# Patient Record
Sex: FEMALE | Race: WHITE | Employment: PART TIME | URBAN - METROPOLITAN AREA
[De-identification: names, ages, dates, MRNs, and addresses within clinical notes are randomized per-mention and may not be internally consistent; named-entity substitution may affect disease eponyms.]

---

## 2017-10-03 ENCOUNTER — OFFICE VISIT (OUTPATIENT)
Dept: FAMILY MEDICINE CLINIC | Age: 47
End: 2017-10-03
Payer: COMMERCIAL

## 2017-10-03 VITALS
BODY MASS INDEX: 22.88 KG/M2 | HEART RATE: 83 BPM | OXYGEN SATURATION: 95 % | WEIGHT: 134 LBS | RESPIRATION RATE: 12 BRPM | HEIGHT: 64 IN | DIASTOLIC BLOOD PRESSURE: 74 MMHG | SYSTOLIC BLOOD PRESSURE: 104 MMHG

## 2017-10-03 DIAGNOSIS — Z13.89 SKIN CONDITION SCREENING: ICD-10-CM

## 2017-10-03 DIAGNOSIS — Z00.00 WELL ADULT EXAM: Primary | ICD-10-CM

## 2017-10-03 PROCEDURE — 99396 PREV VISIT EST AGE 40-64: CPT | Performed by: FAMILY MEDICINE

## 2017-10-03 NOTE — PROGRESS NOTES
disturbance. Respiratory: Negative for cough and shortness of breath. Cardiovascular: Negative for chest pain and leg swelling. Gastrointestinal: Negative for diarrhea, constipation and blood in stool. Endocrine: Negative for polydipsia and polyuria. Genitourinary: Negative for dysuria and hematuria. Musculoskeletal: Negative for back pain and gait problem. Skin: Negative for color change and rash. Neurological: Negative for dizziness and headaches. Psychiatric/Behavioral: Negative for confusion and agitation. Objective:   HENT:   /74  Pulse 83  Resp 12  Ht 5' 3.5\" (1.613 m)  Wt 134 lb (60.8 kg)  SpO2 95%  BMI 23.36 kg/m2  Constitutional: Alert and oriented. Well-nourished. Head: Normocephalic and atraumatic. Right Ear: External ear normal. TM: no bulging, erythema or fluid seen. Left Ear: External ear normal. TM: no bulging, erythema or fluid seen. Nose: Nose normal.   Mouth/Throat: Oropharynx is clear and moist.  teeth in good repair. Eyes: Pupils are equal, round, and reactive to light. Right eye exhibits no discharge. Left eye exhibits no discharge. No scleral icterus. Neck: Normal range of motion. Neck supple. No JVD present. No tracheal deviation present. No thyromegaly present. Cardiovascular: Normal rate, regular rhythm, normal heart sounds and intact distal pulses. Pulmonary/Chest: Effort normal and breath sounds normal. No respiratory distress. She has no wheezes. She has no rales. Abdominal: Soft. Bowel sounds are normal.  She exhibits no distension and no mass. There is no tenderness. There is no rebound and no guarding. Musculoskeletal: Normal range of motion. She exhibits no edema or tenderness. Lymphadenopathy:    She has no cervical adenopathy. Neurological:  She is alert and oriented to person, place, and time. Cranial nerves grossly intact. No sensation problem noted. Muscle strength 5/5 throughout. Skin: Skin is warm and dry.  No rash

## 2017-10-14 LAB
ALBUMIN SERPL-MCNC: NORMAL G/DL
ALP BLD-CCNC: NORMAL U/L
ALT SERPL-CCNC: NORMAL U/L
ANION GAP SERPL CALCULATED.3IONS-SCNC: NORMAL MMOL/L
AST SERPL-CCNC: NORMAL U/L
BASOPHILS ABSOLUTE: NORMAL /ΜL
BASOPHILS RELATIVE PERCENT: NORMAL %
BILIRUB SERPL-MCNC: NORMAL MG/DL (ref 0.1–1.4)
BILIRUBIN, URINE: NORMAL
BLOOD, URINE: NORMAL
BUN BLDV-MCNC: NORMAL MG/DL
CALCIUM SERPL-MCNC: NORMAL MG/DL
CHLORIDE BLD-SCNC: NORMAL MMOL/L
CHOLESTEROL, TOTAL: 201 MG/DL
CHOLESTEROL/HDL RATIO: 3.8
CLARITY: NORMAL
CO2: NORMAL MMOL/L
COLOR: NORMAL
CREAT SERPL-MCNC: NORMAL MG/DL
EOSINOPHILS ABSOLUTE: NORMAL /ΜL
EOSINOPHILS RELATIVE PERCENT: NORMAL %
GFR CALCULATED: NORMAL
GLUCOSE BLD-MCNC: NORMAL MG/DL
GLUCOSE URINE: NORMAL
HCT VFR BLD CALC: NORMAL % (ref 36–46)
HDLC SERPL-MCNC: 53 MG/DL (ref 35–70)
HEMOGLOBIN: NORMAL G/DL (ref 12–16)
KETONES, URINE: NORMAL
LDL CHOLESTEROL CALCULATED: 123 MG/DL (ref 0–160)
LEUKOCYTE ESTERASE, URINE: NORMAL
LYMPHOCYTES ABSOLUTE: NORMAL /ΜL
LYMPHOCYTES RELATIVE PERCENT: NORMAL %
MCH RBC QN AUTO: NORMAL PG
MCHC RBC AUTO-ENTMCNC: NORMAL G/DL
MCV RBC AUTO: NORMAL FL
MONOCYTES ABSOLUTE: NORMAL /ΜL
MONOCYTES RELATIVE PERCENT: NORMAL %
NEUTROPHILS ABSOLUTE: NORMAL /ΜL
NEUTROPHILS RELATIVE PERCENT: NORMAL %
NITRITE, URINE: NORMAL
PDW BLD-RTO: NORMAL %
PH UA: NORMAL (ref 4.5–8)
PLATELET # BLD: NORMAL K/ΜL
PMV BLD AUTO: NORMAL FL
POTASSIUM SERPL-SCNC: NORMAL MMOL/L
PROTEIN UA: NORMAL
RBC # BLD: NORMAL 10^6/ΜL
SODIUM BLD-SCNC: NORMAL MMOL/L
SPECIFIC GRAVITY, URINE: NORMAL
TOTAL PROTEIN: NORMAL
TRIGL SERPL-MCNC: 124 MG/DL
UROBILINOGEN, URINE: NORMAL
VLDLC SERPL CALC-MCNC: 25 MG/DL
WBC # BLD: NORMAL 10^3/ML

## 2017-10-16 DIAGNOSIS — Z00.00 WELL ADULT EXAM: ICD-10-CM

## 2017-10-31 ENCOUNTER — HOSPITAL ENCOUNTER (OUTPATIENT)
Age: 47
Setting detail: SPECIMEN
Discharge: HOME | End: 2017-10-31
Payer: COMMERCIAL

## 2017-10-31 ENCOUNTER — OFFICE VISIT (OUTPATIENT)
Dept: OBGYN CLINIC | Age: 47
End: 2017-10-31
Payer: COMMERCIAL

## 2017-10-31 VITALS
WEIGHT: 121 LBS | DIASTOLIC BLOOD PRESSURE: 74 MMHG | BODY MASS INDEX: 21.44 KG/M2 | HEART RATE: 66 BPM | HEIGHT: 63 IN | SYSTOLIC BLOOD PRESSURE: 117 MMHG

## 2017-10-31 DIAGNOSIS — Z00.00 ENCOUNTER FOR GENERAL ADULT MEDICAL EXAMINATION WITHOUT ABNORMAL FINDINGS: ICD-10-CM

## 2017-10-31 DIAGNOSIS — Z01.419 ENCOUNTER FOR GYNECOLOGICAL EXAMINATION WITHOUT ABNORMAL FINDING: Primary | ICD-10-CM

## 2017-10-31 DIAGNOSIS — Z12.31 ENCOUNTER FOR SCREENING MAMMOGRAM FOR MALIGNANT NEOPLASM OF BREAST: ICD-10-CM

## 2017-10-31 PROCEDURE — 99396 PREV VISIT EST AGE 40-64: CPT | Performed by: OBSTETRICS & GYNECOLOGY

## 2017-10-31 ASSESSMENT — ENCOUNTER SYMPTOMS
ABDOMINAL PAIN: 0
HEARTBURN: 0

## 2017-10-31 NOTE — PROGRESS NOTES
[facility] PHYSICIANS  [facility] - [address] [place] [id number]  Dept: [phone]    Chief complaint:   Chief Complaint   Patient presents with    Gynecologic Exam     pap 10/25/16 neg, eduardo 16 neg       History Present Illness: Jose Cruz Pollock is a 40 yo female who presents for her annual exam.  She does have incontinence with laugh, cough, sneeze. She was referred previously to [facility], but her insurance wouldn't cover it. She denies any bleeding since her period in 2016. She has occasional hot flushes, but says they aren't bothersome. She denies any bowel issues. Current Medications (OTC/Herbal):   Current Outpatient Prescriptions   Medication Sig Dispense Refill    SYNTHROID 125 MCG tablet        No current facility-administered medications for this visit. Allergies: No Known Allergies  Past Medical History:   Past Medical History:   Diagnosis Date    Hypothyroidism      Past Surgical History:   Past Surgical History:   Procedure Laterality Date     SECTION  G2,      Obstetric History:   2  Para 2  Gynecologic History: LMP 2016  Last Pap: 10/25/16       Any history of abnormal paps no    PriorColpo/Biopsy n/a     Last Mammogram 16   Result  normal  Contraception: none  Complications: none  STDs: none  Psychosocial History: Occupation:   Dental hygenist   Caffeine Yes, coffee in am    At risk for depression No    Abuse:   No  Seatbelt:   Yes    Social History     Social History    Marital status:      Spouse name: N/A    Number of children: N/A    Years of education: N/A     Occupational History    Not on file.      Social History Main Topics    Smoking status: Never Smoker    Smokeless tobacco: Never Used    Alcohol use Yes      Comment: occasionally     Drug use: Unknown    Sexual activity: Yes     Partners: Male     Other Topics Concern    Not on file     Social History Narrative    No narrative on file Family History   Problem Relation Age of Onset   Giovanni Burger Cancer Father     Other Sister      cerebral hemmorage    Other Maternal Aunt      cerebral hemmorage       Review of Systems:   Review of Systems   Constitutional: Negative for chills and fever. HENT: Negative for congestion and hearing loss. Cardiovascular: Negative for chest pain and palpitations. Gastrointestinal: Negative for abdominal pain and heartburn. Psychiatric/Behavioral: Negative for depression. The patient is not nervous/anxious. Physical exam:  vitals:  Height   5  ft    3 in,  Weight    121 lbs,   117/74 BP  Gen: alert, no apparent distress  HEENT: No pathologic skin lesions noted,NC/AT,PERRL, normal midline nontender thyroid   Lung Exam: Clear to auscultation in all fields bilaterally, without wheezes,rales or rhonchi. Cardiac Exam: Normal sinus rhythm and rate, without murmurs, rubs or gallops appreciated. Breast Exam: Symmetric without pathological skin changes, nontender without discrete suspicious masses palpated, supraclavicular or axillary adenopathy or nipple discharge noted. Abdominal Exam: Nontender to deep palpation without organomegaly, masses or CVAT appreciated, BS positive. No spinal deformation or tenderness. External Genitalia: Normal development without vulvar, vaginal or cervical lesions noted. Normal vaginal discharge, uterus anterior, 4-6 weeks without CMT. Adnexa nontender without abnormal masses bilaterally. Rectal Exam: Omitted. Extremities: Nontender without clubbing, cyanosis or edema. F.R.O.M. Neurologic Exam: Grossly intact without noted sensorimotor deficits and oriented x 3. Assessment/Plan:   Unremarkable annual Gyn exam.    Urinary stress incontinence - referral to [facility] Pelvic [facility] PT. Discussed with pt that if the PT doesn't help that we could try a pessary with an incontinence knob  Cervical Cytology Evaluation begins at 26years old.   Pt would like Pap yearly despite

## 2017-11-09 LAB — CYTOLOGY REPORT: NORMAL

## 2017-11-14 ENCOUNTER — HOSPITAL ENCOUNTER (OUTPATIENT)
Dept: MAMMOGRAPHY | Facility: CLINIC | Age: 47
Discharge: HOME | End: 2017-11-14
Payer: COMMERCIAL

## 2017-11-14 DIAGNOSIS — Z12.31 ENCOUNTER FOR SCREENING MAMMOGRAM FOR MALIGNANT NEOPLASM OF BREAST: ICD-10-CM

## 2017-11-14 PROCEDURE — G0202 SCR MAMMO BI INCL CAD: HCPCS

## 2018-02-21 ENCOUNTER — TELEPHONE (OUTPATIENT)
Dept: OBGYN CLINIC | Age: 48
End: 2018-02-21

## 2018-02-23 DIAGNOSIS — N39.3 SUI (STRESS URINARY INCONTINENCE, FEMALE): Primary | ICD-10-CM

## 2018-03-23 ENCOUNTER — HOSPITAL ENCOUNTER (OUTPATIENT)
Dept: GENERAL RADIOLOGY | Facility: CLINIC | Age: 48
Discharge: HOME | End: 2018-03-25
Payer: COMMERCIAL

## 2018-03-23 ENCOUNTER — HOSPITAL ENCOUNTER (OUTPATIENT)
Facility: CLINIC | Age: 48
Discharge: HOME | End: 2018-03-25
Payer: COMMERCIAL

## 2018-03-23 ENCOUNTER — HOSPITAL ENCOUNTER (OUTPATIENT)
Facility: CLINIC | Age: 48
Discharge: HOME | End: 2018-03-23
Payer: COMMERCIAL

## 2018-03-23 ENCOUNTER — OFFICE VISIT (OUTPATIENT)
Dept: FAMILY MEDICINE CLINIC | Age: 48
End: 2018-03-23
Payer: COMMERCIAL

## 2018-03-23 VITALS
RESPIRATION RATE: 14 BRPM | BODY MASS INDEX: 23.74 KG/M2 | HEIGHT: 63 IN | WEIGHT: 134 LBS | TEMPERATURE: 97.5 F | DIASTOLIC BLOOD PRESSURE: 77 MMHG | SYSTOLIC BLOOD PRESSURE: 123 MMHG | HEART RATE: 75 BPM | OXYGEN SATURATION: 98 %

## 2018-03-23 DIAGNOSIS — R10.10 RECURRENT UPPER ABDOMINAL PAIN: Primary | ICD-10-CM

## 2018-03-23 DIAGNOSIS — R10.10 RECURRENT UPPER ABDOMINAL PAIN: ICD-10-CM

## 2018-03-23 LAB
ABSOLUTE EOS #: 0.29 K/UL (ref 0–0.44)
ABSOLUTE IMMATURE GRANULOCYTE: <0.03 K/UL (ref 0–0.3)
ABSOLUTE LYMPH #: 1.91 K/UL (ref 1.1–3.7)
ABSOLUTE MONO #: 0.47 K/UL (ref 0.1–1.2)
ANION GAP SERPL CALCULATED.3IONS-SCNC: 11 MMOL/L (ref 9–17)
BASOPHILS # BLD: 1 % (ref 0–2)
BASOPHILS ABSOLUTE: 0.03 K/UL (ref 0–0.2)
BUN BLDV-MCNC: 10 MG/DL (ref 6–20)
BUN/CREAT BLD: ABNORMAL (ref 9–20)
CALCIUM SERPL-MCNC: 9.1 MG/DL (ref 8.6–10.4)
CHLORIDE BLD-SCNC: 94 MMOL/L (ref 98–107)
CO2: 27 MMOL/L (ref 20–31)
CREAT SERPL-MCNC: 0.61 MG/DL (ref 0.5–0.9)
DIFFERENTIAL TYPE: ABNORMAL
EOSINOPHILS RELATIVE PERCENT: 5 % (ref 1–4)
GFR AFRICAN AMERICAN: >60 ML/MIN
GFR NON-AFRICAN AMERICAN: >60 ML/MIN
GFR SERPL CREATININE-BSD FRML MDRD: ABNORMAL ML/MIN/{1.73_M2}
GFR SERPL CREATININE-BSD FRML MDRD: ABNORMAL ML/MIN/{1.73_M2}
GLUCOSE BLD-MCNC: 92 MG/DL (ref 70–99)
HCT VFR BLD CALC: 38.1 % (ref 36.3–47.1)
HEMOGLOBIN: 13 G/DL (ref 11.9–15.1)
IMMATURE GRANULOCYTES: 0 %
LYMPHOCYTES # BLD: 33 % (ref 24–43)
MCH RBC QN AUTO: 31 PG (ref 25.2–33.5)
MCHC RBC AUTO-ENTMCNC: 34.1 G/DL (ref 28.4–34.8)
MCV RBC AUTO: 90.9 FL (ref 82.6–102.9)
MONOCYTES # BLD: 8 % (ref 3–12)
NRBC AUTOMATED: 0 PER 100 WBC
PDW BLD-RTO: 11.7 % (ref 11.8–14.4)
PLATELET # BLD: 324 K/UL (ref 138–453)
PLATELET ESTIMATE: ABNORMAL
PMV BLD AUTO: 9.6 FL (ref 8.1–13.5)
POTASSIUM SERPL-SCNC: 3.9 MMOL/L (ref 3.7–5.3)
RBC # BLD: 4.19 M/UL (ref 3.95–5.11)
RBC # BLD: ABNORMAL 10*6/UL
SEG NEUTROPHILS: 53 % (ref 36–65)
SEGMENTED NEUTROPHILS ABSOLUTE COUNT: 3.12 K/UL (ref 1.5–8.1)
SODIUM BLD-SCNC: 132 MMOL/L (ref 135–144)
WBC # BLD: 5.8 K/UL (ref 3.5–11.3)
WBC # BLD: ABNORMAL 10*3/UL

## 2018-03-23 PROCEDURE — 36415 COLL VENOUS BLD VENIPUNCTURE: CPT

## 2018-03-23 PROCEDURE — 99213 OFFICE O/P EST LOW 20 MIN: CPT | Performed by: FAMILY MEDICINE

## 2018-03-23 PROCEDURE — 80048 BASIC METABOLIC PNL TOTAL CA: CPT

## 2018-03-23 PROCEDURE — 71046 X-RAY EXAM CHEST 2 VIEWS: CPT

## 2018-03-23 PROCEDURE — 85025 COMPLETE CBC W/AUTO DIFF WBC: CPT

## 2018-03-23 ASSESSMENT — PATIENT HEALTH QUESTIONNAIRE - PHQ9
SUM OF ALL RESPONSES TO PHQ QUESTIONS 1-9: 0
1. LITTLE INTEREST OR PLEASURE IN DOING THINGS: 0
SUM OF ALL RESPONSES TO PHQ9 QUESTIONS 1 & 2: 0
2. FEELING DOWN, DEPRESSED OR HOPELESS: 0

## 2018-03-23 NOTE — PROGRESS NOTES
Visit Information    Have you changed or started any medications since your last visit including any over-the-counter medicines, vitamins, or herbal medicines? no   Are you having any side effects from any of your medications? -  no  Have you stopped taking any of your medications? Is so, why? -  no    Have you seen any other physician or provider since your last visit? No  Have you had any other diagnostic tests since your last visit? No  Have you been seen in the emergency room and/or had an admission to a hospital since we last saw you? No  Have you had your routine dental cleaning in the past 6 months? no    Have you activated your [employer] account? If not, what are your barriers?  Yes     Patient Care Team:  Fermín Wallace MD as PCP - General (Family Medicine)  Fermín Wallace MD as PCP - S Attributed Provider  Nicho Stacy MD as Consulting Physician (Endocrinology)    Medical History Review  Past Medical, Family, and Social History reviewed and does not contribute to the patient presenting condition    Health Maintenance   Topic Date Due    HIV screen  01/03/1987    Flu vaccine (1) 09/01/2017    Cervical cancer screen  10/31/2020    Lipid screen  10/14/2022    DTaP/Tdap/Td vaccine (2 - Td) 10/11/2026
Constitutional: Negative for fever and unexpected weight change. See above. Objective:   Physical Exam  Constitutional: VS (see above). General appearance: normal development, habitus and attention, no deformities. Eyes: normal conjunctiva and lids. CAV: RRR, no RMG. No edema lower extremities. Pulmo: CTA bilateral, no CWR. Abdomen: There is tenderness to deep palpation right upper quadrant. No epigastric tenderness. No guarding or rebounding. Bowel sounds positive. Skin: no rashes, lesions or ulcers. Musculoskeletal: normal gait. Nails: no clubbing or cyanosis. Psychiatric: alert and oriented to place, time and person. Normal mood and affect. Assessment:      1. Recurrent upper abdominal pain  US GALLBLADDER RUQ    CBC Auto Differential    Basic Metabolic Panel    XR CHEST (2 VW)       Plan:   Ultrasound and also blood work ordered. I also ordered a chest x-ray to make sure she does not have any sort of pleuritis. Call or return to clinic prn if these symptoms worsen or fail to improve as anticipated. I have reviewed the instructions with the patient, answering all questions to her satisfaction. Return in about 6 months (around 9/23/2018), or if symptoms worsen or fail to improve. Orders Placed This Encounter   Procedures    US GALLBLADDER RUQ     Standing Status:   Future     Standing Expiration Date:   3/23/2019    XR CHEST (2 VW)     Standing Status:   Future     Number of Occurrences:   1     Standing Expiration Date:   3/23/2019    CBC Auto Differential     Standing Status:   Future     Number of Occurrences:   1     Standing Expiration Date:   3/23/2019    Basic Metabolic Panel     Standing Status:   Future     Number of Occurrences:   1     Standing Expiration Date:   3/23/2019     No orders of the defined types were placed in this encounter.       Electronically signed by Damien Carias MD on 3/23/2018 at 3:07 PM       (Please note that portions of this note were completed

## 2018-03-27 ENCOUNTER — HOSPITAL ENCOUNTER (OUTPATIENT)
Dept: ULTRASOUND IMAGING | Facility: CLINIC | Age: 48
Discharge: HOME | End: 2018-03-29
Payer: COMMERCIAL

## 2018-03-27 DIAGNOSIS — R10.10 RECURRENT UPPER ABDOMINAL PAIN: ICD-10-CM

## 2018-03-27 PROCEDURE — 76705 ECHO EXAM OF ABDOMEN: CPT

## 2018-03-28 ENCOUNTER — PATIENT MESSAGE (OUTPATIENT)
Dept: FAMILY MEDICINE CLINIC | Age: 48
End: 2018-03-28

## 2018-04-03 ENCOUNTER — OFFICE VISIT (OUTPATIENT)
Dept: FAMILY MEDICINE CLINIC | Age: 48
End: 2018-04-03
Payer: COMMERCIAL

## 2018-04-03 VITALS
OXYGEN SATURATION: 96 % | SYSTOLIC BLOOD PRESSURE: 113 MMHG | BODY MASS INDEX: 23.74 KG/M2 | WEIGHT: 134 LBS | HEART RATE: 50 BPM | DIASTOLIC BLOOD PRESSURE: 75 MMHG | RESPIRATION RATE: 16 BRPM

## 2018-04-03 DIAGNOSIS — K59.00 CONSTIPATION, UNSPECIFIED CONSTIPATION TYPE: Primary | ICD-10-CM

## 2018-04-03 PROCEDURE — 99213 OFFICE O/P EST LOW 20 MIN: CPT | Performed by: FAMILY MEDICINE

## 2018-04-03 RX ORDER — POLYETHYLENE GLYCOL 3350 17 G/17G
17 POWDER, FOR SOLUTION ORAL DAILY PRN
Qty: 527 G | Refills: 1 | Status: SHIPPED | OUTPATIENT
Start: 2018-04-03 | End: 2018-04-12

## 2018-04-03 RX ORDER — CYCLOBENZAPRINE HCL 5 MG
5 TABLET ORAL NIGHTLY
Qty: 30 TABLET | Refills: 0 | Status: SHIPPED | OUTPATIENT
Start: 2018-04-03 | End: 2018-04-12

## 2018-04-03 RX ORDER — AMOXICILLIN 250 MG
2 CAPSULE ORAL DAILY PRN
Qty: 20 TABLET | Refills: 0 | Status: SHIPPED | OUTPATIENT
Start: 2018-04-03 | End: 2018-04-12

## 2018-04-06 ENCOUNTER — PATIENT MESSAGE (OUTPATIENT)
Dept: FAMILY MEDICINE CLINIC | Age: 48
End: 2018-04-06

## 2018-04-06 DIAGNOSIS — R10.11 RUQ PAIN: Primary | ICD-10-CM

## 2018-04-17 ENCOUNTER — HOSPITAL ENCOUNTER (OUTPATIENT)
Dept: NUCLEAR MEDICINE | Age: 48
Discharge: HOME | End: 2018-04-19
Payer: COMMERCIAL

## 2018-04-17 DIAGNOSIS — R10.11 RIGHT UPPER QUADRANT ABDOMINAL PAIN: ICD-10-CM

## 2018-04-17 PROCEDURE — 3430000000 HC RX DIAGNOSTIC RADIOPHARMACEUTICAL: Performed by: SURGERY

## 2018-04-17 PROCEDURE — 78227 HEPATOBIL SYST IMAGE W/DRUG: CPT

## 2018-04-17 PROCEDURE — A9537 TC99M MEBROFENIN: HCPCS | Performed by: SURGERY

## 2018-04-17 RX ADMIN — MEBROFENIN 4.7 MILLICURIE: 45 INJECTION, POWDER, LYOPHILIZED, FOR SOLUTION INTRAVENOUS at 10:00

## 2018-04-24 ENCOUNTER — ANESTHESIA (OUTPATIENT)
Dept: OPERATING ROOM | Age: 48
End: 2018-04-24
Payer: COMMERCIAL

## 2018-04-24 ENCOUNTER — ANESTHESIA EVENT (OUTPATIENT)
Dept: OPERATING ROOM | Age: 48
End: 2018-04-24
Payer: COMMERCIAL

## 2018-04-24 ENCOUNTER — HOSPITAL ENCOUNTER (OUTPATIENT)
Age: 48
Setting detail: OUTPATIENT SURGERY
Discharge: HOME | End: 2018-04-24
Attending: SURGERY | Admitting: SURGERY
Payer: COMMERCIAL

## 2018-04-24 VITALS — SYSTOLIC BLOOD PRESSURE: 113 MMHG | DIASTOLIC BLOOD PRESSURE: 71 MMHG | OXYGEN SATURATION: 100 % | TEMPERATURE: 95.5 F

## 2018-04-24 VITALS
DIASTOLIC BLOOD PRESSURE: 70 MMHG | SYSTOLIC BLOOD PRESSURE: 109 MMHG | RESPIRATION RATE: 16 BRPM | HEART RATE: 72 BPM | BODY MASS INDEX: 23.09 KG/M2 | TEMPERATURE: 97.2 F | OXYGEN SATURATION: 96 % | WEIGHT: 130.29 LBS | HEIGHT: 63 IN

## 2018-04-24 DIAGNOSIS — F90.9 HYPERKINESIA: Primary | ICD-10-CM

## 2018-04-24 LAB
GFR NON-AFRICAN AMERICAN: >60 ML/MIN
GFR SERPL CREATININE-BSD FRML MDRD: >60 ML/MIN
GFR SERPL CREATININE-BSD FRML MDRD: NORMAL ML/MIN/{1.73_M2}
GLUCOSE BLD-MCNC: 96 MG/DL (ref 74–100)
POC CHLORIDE: 105 MMOL/L (ref 98–107)
POC CREATININE: 0.78 MG/DL (ref 0.51–1.19)
POC HEMATOCRIT: 39 % (ref 36–46)
POC HEMOGLOBIN: 13.4 G/DL (ref 12–16)
POC IONIZED CALCIUM: 1.18 MMOL/L (ref 1.15–1.33)
POC LACTIC ACID: 0.54 MMOL/L (ref 0.56–1.39)
POC POTASSIUM: 3.9 MMOL/L (ref 3.5–4.5)
POC SODIUM: 140 MMOL/L (ref 138–146)

## 2018-04-24 PROCEDURE — 3700000001 HC ADD 15 MINUTES (ANESTHESIA): Performed by: SURGERY

## 2018-04-24 PROCEDURE — 2580000003 HC RX 258: Performed by: SURGERY

## 2018-04-24 PROCEDURE — 3600000009 HC SURGERY ROBOT BASE: Performed by: SURGERY

## 2018-04-24 PROCEDURE — 3700000000 HC ANESTHESIA ATTENDED CARE: Performed by: SURGERY

## 2018-04-24 PROCEDURE — 2580000003 HC RX 258: Performed by: ANESTHESIOLOGY

## 2018-04-24 PROCEDURE — C1760 CLOSURE DEV, VASC: HCPCS | Performed by: SURGERY

## 2018-04-24 PROCEDURE — 7100000040 HC SPAR PHASE II RECOVERY - FIRST 15 MIN: Performed by: SURGERY

## 2018-04-24 PROCEDURE — 84295 ASSAY OF SERUM SODIUM: CPT

## 2018-04-24 PROCEDURE — 2500000003 HC RX 250 WO HCPCS: Performed by: SURGERY

## 2018-04-24 PROCEDURE — 6360000002 HC RX W HCPCS: Performed by: SURGERY

## 2018-04-24 PROCEDURE — 6360000002 HC RX W HCPCS: Performed by: NURSE ANESTHETIST, CERTIFIED REGISTERED

## 2018-04-24 PROCEDURE — 2780000010 HC IMPLANT OTHER: Performed by: SURGERY

## 2018-04-24 PROCEDURE — 6360000002 HC RX W HCPCS: Performed by: ANESTHESIOLOGY

## 2018-04-24 PROCEDURE — 88304 TISSUE EXAM BY PATHOLOGIST: CPT

## 2018-04-24 PROCEDURE — 82435 ASSAY OF BLOOD CHLORIDE: CPT

## 2018-04-24 PROCEDURE — 85014 HEMATOCRIT: CPT

## 2018-04-24 PROCEDURE — 84132 ASSAY OF SERUM POTASSIUM: CPT

## 2018-04-24 PROCEDURE — 82947 ASSAY GLUCOSE BLOOD QUANT: CPT

## 2018-04-24 PROCEDURE — 83605 ASSAY OF LACTIC ACID: CPT

## 2018-04-24 PROCEDURE — 2500000003 HC RX 250 WO HCPCS: Performed by: NURSE ANESTHETIST, CERTIFIED REGISTERED

## 2018-04-24 PROCEDURE — 3600000019 HC SURGERY ROBOT ADDTL 15MIN: Performed by: SURGERY

## 2018-04-24 PROCEDURE — 7100000001 HC PACU RECOVERY - ADDTL 15 MIN: Performed by: SURGERY

## 2018-04-24 PROCEDURE — 82565 ASSAY OF CREATININE: CPT

## 2018-04-24 PROCEDURE — 6370000000 HC RX 637 (ALT 250 FOR IP): Performed by: ANESTHESIOLOGY

## 2018-04-24 PROCEDURE — S2900 ROBOTIC SURGICAL SYSTEM: HCPCS | Performed by: SURGERY

## 2018-04-24 PROCEDURE — 82330 ASSAY OF CALCIUM: CPT

## 2018-04-24 PROCEDURE — 7100000041 HC SPAR PHASE II RECOVERY - ADDTL 15 MIN: Performed by: SURGERY

## 2018-04-24 PROCEDURE — 7100000000 HC PACU RECOVERY - FIRST 15 MIN: Performed by: SURGERY

## 2018-04-24 RX ORDER — LIDOCAINE HYDROCHLORIDE 10 MG/ML
INJECTION, SOLUTION INFILTRATION; PERINEURAL PRN
Status: DISCONTINUED | OUTPATIENT
Start: 2018-04-24 | End: 2018-04-24 | Stop reason: SDUPTHER

## 2018-04-24 RX ORDER — GLYCOPYRROLATE 1 MG/5 ML
SYRINGE (ML) INTRAVENOUS PRN
Status: DISCONTINUED | OUTPATIENT
Start: 2018-04-24 | End: 2018-04-24 | Stop reason: SDUPTHER

## 2018-04-24 RX ORDER — OXYCODONE HYDROCHLORIDE AND ACETAMINOPHEN 5; 325 MG/1; MG/1
1 TABLET ORAL EVERY 4 HOURS PRN
Status: DISCONTINUED | OUTPATIENT
Start: 2018-04-24 | End: 2018-04-24 | Stop reason: HOSPADM

## 2018-04-24 RX ORDER — DOCUSATE SODIUM 100 MG/1
100 CAPSULE, LIQUID FILLED ORAL DAILY PRN
Qty: 25 CAPSULE | Refills: 0 | Status: SHIPPED | OUTPATIENT
Start: 2018-04-24 | End: 2019-05-24 | Stop reason: ALTCHOICE

## 2018-04-24 RX ORDER — INDOCYANINE GREEN AND WATER 25 MG
5 KIT INJECTION ONCE
Status: COMPLETED | OUTPATIENT
Start: 2018-04-24 | End: 2018-04-24

## 2018-04-24 RX ORDER — DIPHENHYDRAMINE HYDROCHLORIDE 50 MG/ML
12.5 INJECTION INTRAMUSCULAR; INTRAVENOUS
Status: DISCONTINUED | OUTPATIENT
Start: 2018-04-24 | End: 2018-04-24 | Stop reason: HOSPADM

## 2018-04-24 RX ORDER — DEXAMETHASONE SODIUM PHOSPHATE 10 MG/ML
INJECTION INTRAMUSCULAR; INTRAVENOUS PRN
Status: DISCONTINUED | OUTPATIENT
Start: 2018-04-24 | End: 2018-04-24 | Stop reason: SDUPTHER

## 2018-04-24 RX ORDER — LABETALOL HYDROCHLORIDE 5 MG/ML
5 INJECTION, SOLUTION INTRAVENOUS EVERY 10 MIN PRN
Status: DISCONTINUED | OUTPATIENT
Start: 2018-04-24 | End: 2018-04-24 | Stop reason: HOSPADM

## 2018-04-24 RX ORDER — BUPIVACAINE HYDROCHLORIDE 2.5 MG/ML
INJECTION, SOLUTION INFILTRATION; PERINEURAL PRN
Status: DISCONTINUED | OUTPATIENT
Start: 2018-04-24 | End: 2018-04-24 | Stop reason: HOSPADM

## 2018-04-24 RX ORDER — FENTANYL CITRATE 50 UG/ML
50 INJECTION, SOLUTION INTRAMUSCULAR; INTRAVENOUS EVERY 5 MIN PRN
Status: DISCONTINUED | OUTPATIENT
Start: 2018-04-24 | End: 2018-04-24 | Stop reason: HOSPADM

## 2018-04-24 RX ORDER — ONDANSETRON 2 MG/ML
INJECTION INTRAMUSCULAR; INTRAVENOUS PRN
Status: DISCONTINUED | OUTPATIENT
Start: 2018-04-24 | End: 2018-04-24 | Stop reason: SDUPTHER

## 2018-04-24 RX ORDER — PROPOFOL 10 MG/ML
INJECTION, EMULSION INTRAVENOUS PRN
Status: DISCONTINUED | OUTPATIENT
Start: 2018-04-24 | End: 2018-04-24 | Stop reason: SDUPTHER

## 2018-04-24 RX ORDER — MAGNESIUM HYDROXIDE 1200 MG/15ML
LIQUID ORAL CONTINUOUS PRN
Status: DISCONTINUED | OUTPATIENT
Start: 2018-04-24 | End: 2018-04-24 | Stop reason: HOSPADM

## 2018-04-24 RX ORDER — MIDAZOLAM HYDROCHLORIDE 1 MG/ML
INJECTION INTRAMUSCULAR; INTRAVENOUS PRN
Status: DISCONTINUED | OUTPATIENT
Start: 2018-04-24 | End: 2018-04-24 | Stop reason: SDUPTHER

## 2018-04-24 RX ORDER — HYDRALAZINE HYDROCHLORIDE 20 MG/ML
5 INJECTION INTRAMUSCULAR; INTRAVENOUS EVERY 10 MIN PRN
Status: DISCONTINUED | OUTPATIENT
Start: 2018-04-24 | End: 2018-04-24 | Stop reason: HOSPADM

## 2018-04-24 RX ORDER — ONDANSETRON 2 MG/ML
4 INJECTION INTRAMUSCULAR; INTRAVENOUS
Status: DISCONTINUED | OUTPATIENT
Start: 2018-04-24 | End: 2018-04-24 | Stop reason: HOSPADM

## 2018-04-24 RX ORDER — FENTANYL CITRATE 50 UG/ML
25 INJECTION, SOLUTION INTRAMUSCULAR; INTRAVENOUS EVERY 5 MIN PRN
Status: DISCONTINUED | OUTPATIENT
Start: 2018-04-24 | End: 2018-04-24 | Stop reason: HOSPADM

## 2018-04-24 RX ORDER — HYDROCODONE BITARTRATE AND ACETAMINOPHEN 5; 325 MG/1; MG/1
1 TABLET ORAL
Status: COMPLETED | OUTPATIENT
Start: 2018-04-24 | End: 2018-04-24

## 2018-04-24 RX ORDER — SODIUM CHLORIDE, SODIUM LACTATE, POTASSIUM CHLORIDE, CALCIUM CHLORIDE 600; 310; 30; 20 MG/100ML; MG/100ML; MG/100ML; MG/100ML
INJECTION, SOLUTION INTRAVENOUS CONTINUOUS
Status: DISCONTINUED | OUTPATIENT
Start: 2018-04-24 | End: 2018-04-24 | Stop reason: HOSPADM

## 2018-04-24 RX ORDER — FENTANYL CITRATE 50 UG/ML
INJECTION, SOLUTION INTRAMUSCULAR; INTRAVENOUS PRN
Status: DISCONTINUED | OUTPATIENT
Start: 2018-04-24 | End: 2018-04-24 | Stop reason: SDUPTHER

## 2018-04-24 RX ORDER — HYDROCODONE BITARTRATE AND ACETAMINOPHEN 5; 325 MG/1; MG/1
1 TABLET ORAL EVERY 6 HOURS PRN
Qty: 25 TABLET | Refills: 0 | Status: SHIPPED | OUTPATIENT
Start: 2018-04-24 | End: 2018-04-29

## 2018-04-24 RX ORDER — ROCURONIUM BROMIDE 10 MG/ML
INJECTION, SOLUTION INTRAVENOUS PRN
Status: DISCONTINUED | OUTPATIENT
Start: 2018-04-24 | End: 2018-04-24 | Stop reason: SDUPTHER

## 2018-04-24 RX ADMIN — Medication 2 G: at 13:14

## 2018-04-24 RX ADMIN — ONDANSETRON 4 MG: 2 INJECTION INTRAMUSCULAR; INTRAVENOUS at 13:52

## 2018-04-24 RX ADMIN — FENTANYL CITRATE 50 MCG: 50 INJECTION, SOLUTION INTRAMUSCULAR; INTRAVENOUS at 14:35

## 2018-04-24 RX ADMIN — FENTANYL CITRATE 50 MCG: 50 INJECTION, SOLUTION INTRAMUSCULAR; INTRAVENOUS at 15:45

## 2018-04-24 RX ADMIN — PHENYLEPHRINE HYDROCHLORIDE 100 MCG: 10 INJECTION INTRAVENOUS at 13:26

## 2018-04-24 RX ADMIN — FENTANYL CITRATE 50 MCG: 50 INJECTION INTRAMUSCULAR; INTRAVENOUS at 14:23

## 2018-04-24 RX ADMIN — PHENYLEPHRINE HYDROCHLORIDE 100 MCG: 10 INJECTION INTRAVENOUS at 13:24

## 2018-04-24 RX ADMIN — NEOSTIGMINE METHYLSULFATE 3 MG: 1 INJECTION, SOLUTION INTRAMUSCULAR; INTRAVENOUS; SUBCUTANEOUS at 14:04

## 2018-04-24 RX ADMIN — ROCURONIUM BROMIDE 50 MG: 10 INJECTION INTRAVENOUS at 12:52

## 2018-04-24 RX ADMIN — INDOCYANINE GREEN 5 MG: KIT INTRAVENOUS at 11:26

## 2018-04-24 RX ADMIN — HYDROCODONE BITARTRATE AND ACETAMINOPHEN 1 TABLET: 5; 325 TABLET ORAL at 16:17

## 2018-04-24 RX ADMIN — FENTANYL CITRATE 25 MCG: 50 INJECTION INTRAMUSCULAR; INTRAVENOUS at 13:20

## 2018-04-24 RX ADMIN — Medication 0.6 MG: at 14:04

## 2018-04-24 RX ADMIN — SODIUM CHLORIDE, POTASSIUM CHLORIDE, SODIUM LACTATE AND CALCIUM CHLORIDE: 600; 310; 30; 20 INJECTION, SOLUTION INTRAVENOUS at 11:15

## 2018-04-24 RX ADMIN — PHENYLEPHRINE HYDROCHLORIDE 200 MCG: 10 INJECTION INTRAVENOUS at 13:30

## 2018-04-24 RX ADMIN — LIDOCAINE HYDROCHLORIDE 50 MG: 10 INJECTION, SOLUTION INFILTRATION; PERINEURAL at 12:52

## 2018-04-24 RX ADMIN — FENTANYL CITRATE 50 MCG: 50 INJECTION INTRAMUSCULAR; INTRAVENOUS at 14:07

## 2018-04-24 RX ADMIN — MIDAZOLAM HYDROCHLORIDE 2 MG: 1 INJECTION, SOLUTION INTRAMUSCULAR; INTRAVENOUS at 12:52

## 2018-04-24 RX ADMIN — FENTANYL CITRATE 50 MCG: 50 INJECTION, SOLUTION INTRAMUSCULAR; INTRAVENOUS at 14:58

## 2018-04-24 RX ADMIN — PROPOFOL 170 MG: 10 INJECTION, EMULSION INTRAVENOUS at 12:52

## 2018-04-24 RX ADMIN — DEXAMETHASONE SODIUM PHOSPHATE 10 MG: 10 INJECTION INTRAMUSCULAR; INTRAVENOUS at 13:02

## 2018-04-24 RX ADMIN — SODIUM CHLORIDE, POTASSIUM CHLORIDE, SODIUM LACTATE AND CALCIUM CHLORIDE: 600; 310; 30; 20 INJECTION, SOLUTION INTRAVENOUS at 13:46

## 2018-04-24 RX ADMIN — FENTANYL CITRATE 50 MCG: 50 INJECTION INTRAMUSCULAR; INTRAVENOUS at 12:52

## 2018-04-24 RX ADMIN — FENTANYL CITRATE 25 MCG: 50 INJECTION INTRAMUSCULAR; INTRAVENOUS at 14:15

## 2018-04-24 ASSESSMENT — PULMONARY FUNCTION TESTS
PIF_VALUE: 23
PIF_VALUE: 17
PIF_VALUE: 1
PIF_VALUE: 22
PIF_VALUE: 15
PIF_VALUE: 17
PIF_VALUE: 23
PIF_VALUE: 18
PIF_VALUE: 22
PIF_VALUE: 17
PIF_VALUE: 20
PIF_VALUE: 18
PIF_VALUE: 17
PIF_VALUE: 17
PIF_VALUE: 19
PIF_VALUE: 17
PIF_VALUE: 16
PIF_VALUE: 22
PIF_VALUE: 4
PIF_VALUE: 16
PIF_VALUE: 25
PIF_VALUE: 2
PIF_VALUE: 22
PIF_VALUE: 1
PIF_VALUE: 17
PIF_VALUE: 5
PIF_VALUE: 16
PIF_VALUE: 23
PIF_VALUE: 22
PIF_VALUE: 17
PIF_VALUE: 19
PIF_VALUE: 17
PIF_VALUE: 23
PIF_VALUE: 17
PIF_VALUE: 18
PIF_VALUE: 22
PIF_VALUE: 17
PIF_VALUE: 16
PIF_VALUE: 22
PIF_VALUE: 22
PIF_VALUE: 23
PIF_VALUE: 17
PIF_VALUE: 18
PIF_VALUE: 20
PIF_VALUE: 2
PIF_VALUE: 17
PIF_VALUE: 17
PIF_VALUE: 22
PIF_VALUE: 17
PIF_VALUE: 23
PIF_VALUE: 22
PIF_VALUE: 18
PIF_VALUE: 17
PIF_VALUE: 18
PIF_VALUE: 17
PIF_VALUE: 23
PIF_VALUE: 17
PIF_VALUE: 17
PIF_VALUE: 22
PIF_VALUE: 17
PIF_VALUE: 17
PIF_VALUE: 2
PIF_VALUE: 17
PIF_VALUE: 23
PIF_VALUE: 23
PIF_VALUE: 16
PIF_VALUE: 22
PIF_VALUE: 3
PIF_VALUE: 22
PIF_VALUE: 19
PIF_VALUE: 17
PIF_VALUE: 23
PIF_VALUE: 23
PIF_VALUE: 19
PIF_VALUE: 1
PIF_VALUE: 16
PIF_VALUE: 22
PIF_VALUE: 22
PIF_VALUE: 19
PIF_VALUE: 19
PIF_VALUE: 20
PIF_VALUE: 19
PIF_VALUE: 23
PIF_VALUE: 23
PIF_VALUE: 22
PIF_VALUE: 17
PIF_VALUE: 1
PIF_VALUE: 17

## 2018-04-24 ASSESSMENT — PAIN DESCRIPTION - LOCATION: LOCATION: ABDOMEN

## 2018-04-24 ASSESSMENT — PAIN SCALES - GENERAL
PAINLEVEL_OUTOF10: 8
PAINLEVEL_OUTOF10: 6
PAINLEVEL_OUTOF10: 8
PAINLEVEL_OUTOF10: 6
PAINLEVEL_OUTOF10: 8

## 2018-04-24 ASSESSMENT — PAIN DESCRIPTION - PAIN TYPE: TYPE: SURGICAL PAIN

## 2018-04-24 ASSESSMENT — PAIN - FUNCTIONAL ASSESSMENT: PAIN_FUNCTIONAL_ASSESSMENT: 0-10

## 2018-04-26 LAB — SURGICAL PATHOLOGY REPORT: NORMAL

## 2018-11-02 ENCOUNTER — OFFICE VISIT (OUTPATIENT)
Dept: OBGYN CLINIC | Age: 48
End: 2018-11-02
Payer: COMMERCIAL

## 2018-11-02 ENCOUNTER — HOSPITAL ENCOUNTER (OUTPATIENT)
Age: 48
Setting detail: SPECIMEN
Discharge: HOME | End: 2018-11-02
Payer: COMMERCIAL

## 2018-11-02 VITALS
SYSTOLIC BLOOD PRESSURE: 115 MMHG | DIASTOLIC BLOOD PRESSURE: 71 MMHG | HEIGHT: 63 IN | HEART RATE: 88 BPM | BODY MASS INDEX: 23.04 KG/M2 | WEIGHT: 130 LBS

## 2018-11-02 DIAGNOSIS — Z12.31 ENCOUNTER FOR SCREENING MAMMOGRAM FOR MALIGNANT NEOPLASM OF BREAST: ICD-10-CM

## 2018-11-02 DIAGNOSIS — Z01.419 WOMEN'S ANNUAL ROUTINE GYNECOLOGICAL EXAMINATION: Primary | ICD-10-CM

## 2018-11-02 PROCEDURE — 99396 PREV VISIT EST AGE 40-64: CPT | Performed by: OBSTETRICS & GYNECOLOGY

## 2018-11-02 ASSESSMENT — ENCOUNTER SYMPTOMS
SHORTNESS OF BREATH: 0
BACK PAIN: 0
COUGH: 0
ABDOMINAL PAIN: 0

## 2018-11-02 NOTE — PROGRESS NOTES
[facility] PHYSICIANS  [facility] - [place] [address] [place] [id number]  Dept: [phone]    Chief complaint:   Chief Complaint   Patient presents with    Gynecologic Exam     prev pap 10/2017, negative. Mammogram 2017, BR 1       History Present Illness: Remy Ravi is a 56 yo female who presents for her annual exam.  She is still having some incontinence. She was going to PT and that helped a little, but she is still having problems. She had her gallbladder out this year and says she is feeling much better. She denies any bowel issues. Current Medications (OTC/Herbal):   Current Outpatient Prescriptions   Medication Sig Dispense Refill    SYNTHROID 125 MCG tablet       docusate sodium (COLACE) 100 MG capsule Take 1 capsule by mouth daily as needed for Constipation 25 capsule 0     No current facility-administered medications for this visit.       Allergies: No Known Allergies  Past Medical History:   Past Medical History:   Diagnosis Date    Abdominal pain     Hypothyroidism     Nervously anxious     Wears glasses      Past Surgical History:   Past Surgical History:   Procedure Laterality Date     SECTION  G2, 2007    CHOLECYSTECTOMY, LAPAROSCOPIC  2018    robotic    VT LAP,CHOLECYSTECTOMY N/A 2018    XI ROBOTIC LAPAROSCOPIC CHOLECYSTECTOMY, performed by Shantell Valdez IV, DO at [facility] OR     Obstetric History:   2  Para 2  Gynecologic History: LMP 16      Last Pap: 10/31/17       Any history of abnormal paps no    PriorColpo/Biopsy n/a     Last Mammogram 17 Result  normal  Contraception: none  Complications: none  STDs: none  Psychosocial History: Occupation:   Dental hygienist    Caffeine Yes    At risk for depression No    Abuse:   No  Seatbelt:   Yes  Exercise:  Yes, 2-4x/wk    Social History     Social History    Marital status:      Spouse name: N/A    Number of children: N/A    Years of education: N/A

## 2018-11-07 LAB
HPV SAMPLE: ABNORMAL
HPV SOURCE: ABNORMAL
HPV, GENOTYPE 16: NOT DETECTED
HPV, GENOTYPE 18: DETECTED
HPV, HIGH RISK OTHER: NOT DETECTED
HPV, INTERPRETATION: ABNORMAL

## 2018-11-13 ENCOUNTER — HOSPITAL ENCOUNTER (OUTPATIENT)
Dept: MAMMOGRAPHY | Facility: CLINIC | Age: 48
Discharge: HOME | End: 2018-11-15
Payer: COMMERCIAL

## 2018-11-13 DIAGNOSIS — Z12.31 ENCOUNTER FOR SCREENING MAMMOGRAM FOR MALIGNANT NEOPLASM OF BREAST: ICD-10-CM

## 2018-11-13 PROCEDURE — 77067 SCR MAMMO BI INCL CAD: CPT

## 2018-11-21 LAB — CYTOLOGY REPORT: NORMAL

## 2019-04-30 ENCOUNTER — OFFICE VISIT (OUTPATIENT)
Dept: FAMILY MEDICINE CLINIC | Age: 49
End: 2019-04-30
Payer: COMMERCIAL

## 2019-04-30 VITALS
BODY MASS INDEX: 23.39 KG/M2 | WEIGHT: 132 LBS | HEART RATE: 72 BPM | SYSTOLIC BLOOD PRESSURE: 99 MMHG | OXYGEN SATURATION: 99 % | TEMPERATURE: 97.5 F | RESPIRATION RATE: 14 BRPM | DIASTOLIC BLOOD PRESSURE: 66 MMHG | HEIGHT: 63 IN

## 2019-04-30 DIAGNOSIS — Z00.01 ENCOUNTER FOR WELL ADULT EXAM WITH ABNORMAL FINDINGS: Primary | ICD-10-CM

## 2019-04-30 DIAGNOSIS — S43.51XA SPRAIN OF RIGHT ACROMIOCLAVICULAR JOINT, INITIAL ENCOUNTER: ICD-10-CM

## 2019-04-30 DIAGNOSIS — Z12.83 SKIN CANCER SCREENING: ICD-10-CM

## 2019-04-30 PROCEDURE — 99396 PREV VISIT EST AGE 40-64: CPT | Performed by: FAMILY MEDICINE

## 2019-04-30 PROCEDURE — 99213 OFFICE O/P EST LOW 20 MIN: CPT | Performed by: FAMILY MEDICINE

## 2019-04-30 ASSESSMENT — PATIENT HEALTH QUESTIONNAIRE - PHQ9
SUM OF ALL RESPONSES TO PHQ9 QUESTIONS 1 & 2: 0
SUM OF ALL RESPONSES TO PHQ QUESTIONS 1-9: 0
1. LITTLE INTEREST OR PLEASURE IN DOING THINGS: 0
SUM OF ALL RESPONSES TO PHQ QUESTIONS 1-9: 0
2. FEELING DOWN, DEPRESSED OR HOPELESS: 0

## 2019-05-04 LAB
ALBUMIN SERPL-MCNC: NORMAL G/DL
ALP BLD-CCNC: NORMAL U/L
ALT SERPL-CCNC: NORMAL U/L
ANION GAP SERPL CALCULATED.3IONS-SCNC: NORMAL MMOL/L
AST SERPL-CCNC: NORMAL U/L
BASOPHILS ABSOLUTE: NORMAL /ΜL
BASOPHILS RELATIVE PERCENT: NORMAL %
BILIRUB SERPL-MCNC: NORMAL MG/DL (ref 0.1–1.4)
BILIRUBIN, URINE: NORMAL
BLOOD, URINE: NORMAL
BUN BLDV-MCNC: NORMAL MG/DL
CALCIUM SERPL-MCNC: NORMAL MG/DL
CHLORIDE BLD-SCNC: NORMAL MMOL/L
CHOLESTEROL, TOTAL: 188 MG/DL
CHOLESTEROL/HDL RATIO: 3.2
CLARITY: NORMAL
CO2: NORMAL MMOL/L
COLOR: NORMAL
CREAT SERPL-MCNC: NORMAL MG/DL
EOSINOPHILS ABSOLUTE: NORMAL /ΜL
EOSINOPHILS RELATIVE PERCENT: NORMAL %
GFR CALCULATED: NORMAL
GLUCOSE BLD-MCNC: 95 MG/DL
GLUCOSE URINE: NORMAL
HCT VFR BLD CALC: NORMAL % (ref 36–46)
HDLC SERPL-MCNC: 58 MG/DL (ref 35–70)
HEMOGLOBIN: NORMAL G/DL (ref 12–16)
KETONES, URINE: NORMAL
LDL CHOLESTEROL CALCULATED: 106 MG/DL (ref 0–160)
LEUKOCYTE ESTERASE, URINE: NORMAL
LYMPHOCYTES ABSOLUTE: NORMAL /ΜL
LYMPHOCYTES RELATIVE PERCENT: NORMAL %
MCH RBC QN AUTO: NORMAL PG
MCHC RBC AUTO-ENTMCNC: NORMAL G/DL
MCV RBC AUTO: NORMAL FL
MONOCYTES ABSOLUTE: NORMAL /ΜL
MONOCYTES RELATIVE PERCENT: NORMAL %
NEUTROPHILS ABSOLUTE: NORMAL /ΜL
NEUTROPHILS RELATIVE PERCENT: NORMAL %
NITRITE, URINE: NORMAL
PDW BLD-RTO: NORMAL %
PH UA: NORMAL (ref 4.5–8)
PLATELET # BLD: NORMAL K/ΜL
PMV BLD AUTO: NORMAL FL
POTASSIUM SERPL-SCNC: NORMAL MMOL/L
PROTEIN UA: NORMAL
RBC # BLD: NORMAL 10^6/ΜL
SODIUM BLD-SCNC: NORMAL MMOL/L
SPECIFIC GRAVITY, URINE: NORMAL
TOTAL PROTEIN: NORMAL
TRIGL SERPL-MCNC: 120 MG/DL
UROBILINOGEN, URINE: NORMAL
VLDLC SERPL CALC-MCNC: NORMAL MG/DL
WBC # BLD: NORMAL 10^3/ML

## 2019-05-06 DIAGNOSIS — Z00.01 ENCOUNTER FOR WELL ADULT EXAM WITH ABNORMAL FINDINGS: ICD-10-CM

## 2019-05-24 ENCOUNTER — HOSPITAL ENCOUNTER (OUTPATIENT)
Age: 49
Setting detail: SPECIMEN
Discharge: HOME | End: 2019-05-24
Payer: COMMERCIAL

## 2019-05-24 ENCOUNTER — OFFICE VISIT (OUTPATIENT)
Dept: DERMATOLOGY | Age: 49
End: 2019-05-24
Payer: COMMERCIAL

## 2019-05-24 VITALS
HEART RATE: 80 BPM | OXYGEN SATURATION: 94 % | WEIGHT: 135.2 LBS | HEIGHT: 63 IN | SYSTOLIC BLOOD PRESSURE: 110 MMHG | DIASTOLIC BLOOD PRESSURE: 77 MMHG | BODY MASS INDEX: 23.96 KG/M2

## 2019-05-24 DIAGNOSIS — D22.9 MULTIPLE BENIGN NEVI: Primary | ICD-10-CM

## 2019-05-24 DIAGNOSIS — D48.5 NEOPLASM OF UNCERTAIN BEHAVIOR OF SKIN: ICD-10-CM

## 2019-05-24 DIAGNOSIS — L72.0 EPIDERMAL INCLUSION CYST: ICD-10-CM

## 2019-05-24 PROCEDURE — 99203 OFFICE O/P NEW LOW 30 MIN: CPT | Performed by: DERMATOLOGY

## 2019-05-24 PROCEDURE — 11102 TANGNTL BX SKIN SINGLE LES: CPT | Performed by: DERMATOLOGY

## 2019-05-24 RX ORDER — LIDOCAINE HYDROCHLORIDE AND EPINEPHRINE 10; 10 MG/ML; UG/ML
0.5 INJECTION, SOLUTION INFILTRATION; PERINEURAL ONCE
Status: COMPLETED | OUTPATIENT
Start: 2019-05-24 | End: 2019-05-24

## 2019-05-24 RX ADMIN — LIDOCAINE HYDROCHLORIDE AND EPINEPHRINE 0.5 ML: 10; 10 INJECTION, SOLUTION INFILTRATION; PERINEURAL at 11:29

## 2019-05-24 NOTE — PROGRESS NOTES
Dermatology Patient Note  [facility] DERMATOLOGY  [address] [facility]  [address] [place] [address] [address]  Dept: [phone]  Dept Fax: [phone]: 5/24/2019   REFERRING PROVIDER: Jalen Robison MD      Julissa Mendoza is a 54 y.o. female  who presents today in the office for:    New Patient (FBSE/ Patient states she has a lot of moles, and that her pcp wanted her to have them checked. )      HISTORY OF PRESENT ILLNESS:  54 y.o. female presents for routine skin check. Patient reports concerning lesion:    Location: right armpit  Duration: several months  Symptoms: drains white material  Course: persistent  Prior biopsy: no   Prior treatment: no    Dermatologic history:  No personal h/o skin cancer. Father had SCC      CURRENT MEDICATIONS:   Current Outpatient Medications   Medication Sig Dispense Refill    SYNTHROID 125 MCG tablet        No current facility-administered medications for this visit. ALLERGIES:   No Known Allergies    SOCIAL HISTORY:  Social History     Tobacco Use    Smoking status: Never Smoker    Smokeless tobacco: Never Used   Substance Use Topics    Alcohol use: Yes     Comment: occasionally        REVIEW OF SYSTEMS:  Review of Systems  Skin: Denies any new changing, growing orbleeding lesions or rashes except as described in the HPI   Constitutional: Denies fevers, chills, and malaise. PHYSICAL EXAM:   /77 (Site: Left Upper Arm, Position: Sitting, Cuff Size: Medium Adult)   Pulse 80   Ht 5' 3\" (1.6 m)   Wt 135 lb 3.2 oz (61.3 kg)   LMP 04/08/2016   SpO2 94%   BMI 23.95 kg/m²     General Exam:  General Appearance: No acute distress, Well nourished     Neuro: Alert and oriented to person, place and time  Psych: Normal affect   Lymph Node: Not performed    Cutaneous Exam: Performed as documented in clinic note below.   Total body skin exam, including head/face, neck, both arms, chest, back, abdomen, both legs, buttocks, digits and/or nails, was examined. Genital exam was deferred as patient denied having any lesions in this area. Pertinent Physical Exam Findings:  Physical Exam  Scattered tan to brown homogenous macules and thin papules with regular borders on the face, trunk and upper and lower extremities  Cherry red papules on trunk  Right axilla with small SC nodules draining keratin with lateral pressure  Left anterior thigh with irregular brown macule with two toned pigment    Photo surveillance performed: Yes    Medical Necessity of Exam Performed:   Distribution of patient concerns    Additional Diagnostic Testing performed during exam: Not performed ,  Not performed    ASSESSMENT:   Diagnosis Orders   1. Multiple benign nevi     2. Neoplasm of uncertain behavior of skin     3. Epidermal inclusion cyst         Plan of Action is as Follows:  Assessment   1. Multiple benign nevi  - Clinically and Dermatoscopically Benign on Exam Today  - Reviewed ABCDE of moles and melanoma  - Discussed sunscreen and sun protection - recommend SPF 30 or greater sunscreen applied every 2-3 hours, sun protective clothing and avoidance of peak sun. 2. Nevus r/o atypia, left anterior thigh  Shave Biopsy: After cleaning with alcohol the lesion was anesthetized with 1% lidocaine with epinephrine and was removed with a dermablade. Hemostasis was achieved with aluminum chloride and Vaseline and a bandage were applied. 3. Epidermal inclusion cyst  reassurance and education  Patient not interested in excision    RTC 1 year or pending path            Patient Instructions   BIOPSY WOUND CARE    A biopsy is where a small piece of skin tissue is removed and examined by a pathologist.  When a biopsy is done, there is a small wound site that requires proper care to prevent infection and scarring. Some biopsies require sutures and their removal.    How to Care for Biopsy Wound    A.  Leave band-aid or dressing on for 24 hours. B.   Wash two times a day with soap and water.  C.  Let the wound air dry, then apply Vaseline ointment and cover with a Band-Aid       unless otherwise instructed by your provider. D. If there is slight discomfort, you may give acetaminophen or ibuprofen. When To Call the Doctor    Call the Dermatology Clinic or your doctor if any of the following occur:    A. Redness and swelling  B. Tenderness and warm to touch  C.  Drainage from wound  D. Fever    Biopsy Results    Biopsy results are usually available in 1-2 weeks. We provide biopsy results in letters for begin results or we will call for any concerning results. If you have not heard from our staff please call the office within 2 weeks. Please call our office with any concerns at [phone]. Follow-up: No follow-ups on file. This note was created with the assistance of a speech-recognition program.  Although the intention is to generate a document that actually reflects the content of the visit, no guarantees can be provided that every mistake has been identified and corrected byediting.     Electronically signed by Arnold Dickinson MD on 5/24/19 at 9:44 AM

## 2019-05-29 LAB — DERMATOLOGY PATHOLOGY REPORT: NORMAL

## 2019-11-25 ENCOUNTER — TELEPHONE (OUTPATIENT)
Dept: OBGYN CLINIC | Age: 49
End: 2019-11-25

## 2019-12-03 ENCOUNTER — HOSPITAL ENCOUNTER (OUTPATIENT)
Age: 49
Setting detail: SPECIMEN
Discharge: HOME | End: 2019-12-03
Payer: COMMERCIAL

## 2019-12-03 ENCOUNTER — OFFICE VISIT (OUTPATIENT)
Dept: OBGYN CLINIC | Age: 49
End: 2019-12-03
Payer: COMMERCIAL

## 2019-12-03 VITALS
HEIGHT: 63 IN | SYSTOLIC BLOOD PRESSURE: 115 MMHG | BODY MASS INDEX: 23.04 KG/M2 | HEART RATE: 76 BPM | DIASTOLIC BLOOD PRESSURE: 73 MMHG | WEIGHT: 130 LBS

## 2019-12-03 DIAGNOSIS — Z12.31 ENCOUNTER FOR SCREENING MAMMOGRAM FOR MALIGNANT NEOPLASM OF BREAST: Primary | ICD-10-CM

## 2019-12-03 DIAGNOSIS — Z01.419 WOMEN'S ANNUAL ROUTINE GYNECOLOGICAL EXAMINATION: ICD-10-CM

## 2019-12-03 PROCEDURE — 99396 PREV VISIT EST AGE 40-64: CPT | Performed by: OBSTETRICS & GYNECOLOGY

## 2019-12-03 ASSESSMENT — ENCOUNTER SYMPTOMS
SHORTNESS OF BREATH: 0
COUGH: 0
CONSTIPATION: 0
BACK PAIN: 0
ABDOMINAL PAIN: 0

## 2019-12-06 LAB
HPV SAMPLE: ABNORMAL
HPV, GENOTYPE 16: NOT DETECTED
HPV, GENOTYPE 18: DETECTED
HPV, HIGH RISK OTHER: NOT DETECTED
HPV, INTERPRETATION: ABNORMAL
SPECIMEN DESCRIPTION: ABNORMAL

## 2019-12-10 ENCOUNTER — PATIENT MESSAGE (OUTPATIENT)
Dept: OBGYN CLINIC | Age: 49
End: 2019-12-10

## 2019-12-10 PROBLEM — R87.611 ATYPICAL SQUAMOUS CELLS CANNOT EXCLUDE HIGH GRADE SQUAMOUS INTRAEPITHELIAL LESION ON CYTOLOGIC SMEAR OF CERVIX (ASC-H): Status: ACTIVE | Noted: 2019-12-10

## 2019-12-10 LAB — CYTOLOGY REPORT: NORMAL

## 2019-12-11 ENCOUNTER — PATIENT MESSAGE (OUTPATIENT)
Dept: OBGYN CLINIC | Age: 49
End: 2019-12-11

## 2019-12-13 ENCOUNTER — HOSPITAL ENCOUNTER (OUTPATIENT)
Dept: MAMMOGRAPHY | Facility: CLINIC | Age: 49
Discharge: HOME | End: 2019-12-15
Payer: COMMERCIAL

## 2019-12-13 DIAGNOSIS — Z12.31 ENCOUNTER FOR SCREENING MAMMOGRAM FOR MALIGNANT NEOPLASM OF BREAST: ICD-10-CM

## 2019-12-13 PROCEDURE — 77067 SCR MAMMO BI INCL CAD: CPT

## 2020-01-14 ENCOUNTER — HOSPITAL ENCOUNTER (OUTPATIENT)
Age: 50
Setting detail: SPECIMEN
Discharge: HOME | End: 2020-01-14
Payer: COMMERCIAL

## 2020-01-14 ENCOUNTER — PROCEDURE VISIT (OUTPATIENT)
Dept: OBGYN CLINIC | Age: 50
End: 2020-01-14
Payer: COMMERCIAL

## 2020-01-14 VITALS
BODY MASS INDEX: 23.04 KG/M2 | HEIGHT: 63 IN | HEART RATE: 90 BPM | DIASTOLIC BLOOD PRESSURE: 84 MMHG | WEIGHT: 130 LBS | SYSTOLIC BLOOD PRESSURE: 121 MMHG

## 2020-01-14 PROCEDURE — 57454 BX/CURETT OF CERVIX W/SCOPE: CPT | Performed by: OBSTETRICS & GYNECOLOGY

## 2020-01-14 NOTE — PROGRESS NOTES
meetings of clubs or organizations: Not on file     Relationship status: Not on file    Intimate partner violence:     Fear of current or ex partner: Not on file     Emotionally abused: Not on file     Physically abused: Not on file     Forced sexual activity: Not on file   Other Topics Concern    Not on file   Social History Narrative    Not on file       Current Outpatient Medications on File Prior to Visit   Medication Sig Dispense Refill    SYNTHROID 125 MCG tablet        No current facility-administered medications on file prior to visit. Allergies as of 01/14/2020    (No Known Allergies)           INDICATIONS:   1. LGSIL of cervix of undetermined significance    2. Cervical high risk HPV (human papillomavirus) test positive                   UHCG: negative         HPV:   positive      Birth Control Method: none  Abnormal Cytology and Colposcopy History: none    COLPOSCOPIC EXAMINATION:                Blood pressure 121/84, pulse 90, height 5' 3\" (1.6 m), weight 130 lb (59 kg), last menstrual period 04/08/2016, not currently breastfeeding. Gross observations: negative  Satisfactory: Yes   Unsatisfactory: No    Physical Exam  Genitourinary:                  ECC performed:  Yes    Lugols Iodine Applied:   No       IMPRESSIONS: normal or GENI   Biopsy sites: 4 o'clock      Assessment:   Diagnosis Orders   1. LGSIL of cervix of undetermined significance     2. Cervical high risk HPV (human papillomavirus) test positive           PLAN:   1. The patient was given formal restriction guidelines. She is instructed to report any increase in vaginal bleeding, discharge, or any temperature more than 100.4   F. Strict pelvic rest precautions were reviewed with lifting restrictions. Will call pt with results of colposcopy.          Maria Ines Victor MD

## 2020-01-16 LAB — SURGICAL PATHOLOGY REPORT: NORMAL

## 2020-04-14 ENCOUNTER — PATIENT MESSAGE (OUTPATIENT)
Dept: OBGYN CLINIC | Age: 50
End: 2020-04-14

## 2020-05-25 ENCOUNTER — PATIENT MESSAGE (OUTPATIENT)
Dept: FAMILY MEDICINE CLINIC | Age: 50
End: 2020-05-25

## 2020-05-26 ENCOUNTER — OFFICE VISIT (OUTPATIENT)
Dept: FAMILY MEDICINE CLINIC | Age: 50
End: 2020-05-26
Payer: COMMERCIAL

## 2020-05-26 VITALS
BODY MASS INDEX: 24.62 KG/M2 | SYSTOLIC BLOOD PRESSURE: 121 MMHG | WEIGHT: 139 LBS | OXYGEN SATURATION: 99 % | TEMPERATURE: 97.8 F | HEART RATE: 90 BPM | DIASTOLIC BLOOD PRESSURE: 82 MMHG

## 2020-05-26 PROCEDURE — 99214 OFFICE O/P EST MOD 30 MIN: CPT | Performed by: FAMILY MEDICINE

## 2020-05-26 ASSESSMENT — PATIENT HEALTH QUESTIONNAIRE - PHQ9
1. LITTLE INTEREST OR PLEASURE IN DOING THINGS: 0
SUM OF ALL RESPONSES TO PHQ9 QUESTIONS 1 & 2: 0
SUM OF ALL RESPONSES TO PHQ QUESTIONS 1-9: 0
SUM OF ALL RESPONSES TO PHQ QUESTIONS 1-9: 0
2. FEELING DOWN, DEPRESSED OR HOPELESS: 0

## 2020-05-29 NOTE — PATIENT INSTRUCTIONS
Last refill on Wellbutrin 5/4/2020 #30  Last ov 3/7/2019  Next ov none            MD to address Bupropion refill    Take 1200 of calcium, and 600 of vitamin D

## 2020-05-30 ENCOUNTER — HOSPITAL ENCOUNTER (OUTPATIENT)
Dept: CT IMAGING | Facility: CLINIC | Age: 50
Discharge: HOME | End: 2020-06-01
Payer: COMMERCIAL

## 2020-05-30 PROCEDURE — 74176 CT ABD & PELVIS W/O CONTRAST: CPT

## 2020-06-01 ENCOUNTER — TELEPHONE (OUTPATIENT)
Dept: FAMILY MEDICINE CLINIC | Age: 50
End: 2020-06-01

## 2020-06-01 ENCOUNTER — PATIENT MESSAGE (OUTPATIENT)
Dept: FAMILY MEDICINE CLINIC | Age: 50
End: 2020-06-01

## 2020-06-01 NOTE — TELEPHONE ENCOUNTER
----- Message from Liseth Hernández MD sent at 6/1/2020 11:54 AM EDT -----  Pt seems to have a colitis. Please refer to GI for further recommendations. Thank you.

## 2020-06-03 ENCOUNTER — TELEPHONE (OUTPATIENT)
Dept: GASTROENTEROLOGY | Age: 50
End: 2020-06-03

## 2020-06-11 ENCOUNTER — TELEPHONE (OUTPATIENT)
Dept: GASTROENTEROLOGY | Age: 50
End: 2020-06-11

## 2020-06-12 ENCOUNTER — OFFICE VISIT (OUTPATIENT)
Dept: GASTROENTEROLOGY | Age: 50
End: 2020-06-12
Payer: COMMERCIAL

## 2020-06-12 VITALS — BODY MASS INDEX: 24.09 KG/M2 | WEIGHT: 136 LBS | TEMPERATURE: 97.3 F | RESPIRATION RATE: 19 BRPM

## 2020-06-12 PROCEDURE — 99202 OFFICE O/P NEW SF 15 MIN: CPT | Performed by: INTERNAL MEDICINE

## 2020-06-12 ASSESSMENT — ENCOUNTER SYMPTOMS
ABDOMINAL DISTENTION: 1
RESPIRATORY NEGATIVE: 1
ABDOMINAL PAIN: 1
EYES NEGATIVE: 1

## 2020-06-12 NOTE — PROGRESS NOTES
Subjective:      Patient ID: Leonarda Samson is a 52 y.o. female. HPI    Review of Systems   Constitutional: Positive for appetite change. HENT: Negative. Eyes: Negative. Respiratory: Negative. Cardiovascular: Negative. Gastrointestinal: Positive for abdominal distention and abdominal pain. Endocrine: Negative. Genitourinary: Negative. Musculoskeletal: Negative. Skin: Negative. Allergic/Immunologic: Positive for environmental allergies. Neurological: Negative. Hematological: Negative. Psychiatric/Behavioral: Negative. All other systems reviewed and are negative.       Objective:   Physical Exam    Assessment:            Plan:

## 2020-06-12 NOTE — PROGRESS NOTES
abnormalities. Cholecystectomy noted. No renal calculi. No hydronephrosis. GI/Bowel: There is limited evaluation due to absence of oral contrast.  The stomach is grossly normal small bowel shows no obstruction or focal lesions. The terminal ileum is unremarkable. There is normal thin appendix. There is some focal thickening of the ascending colon close to the hepatic flexure with mild pericolonic stranding. Lack of both IV and oral contrast limits evaluation. The appearance is suggestive of colitis but should be followed to complete resolution. Pelvis: Uterus and urinary bladder grossly normal. Peritoneum/Retroperitoneum: No free intraperitoneal fluid. A group of left upper abdomen enlarged mesenteric lymph nodes just inferior to the pancreas. Largest 1 x 2 cm. Bones/Soft Tissues: No acute abnormality of the bones. The superficial soft tissues show no significant abnormalities. 1. Focal thickening of the ascending colon close to the hepatic flexure with some surrounding inflammatory stranding. Limited evaluation the absence of both IV and oral contrast.  Findings may are suggestive of a colitis but follow-up to complete resolution is recommended to exclude differential of neoplasm. 2. Nonspecific left upper abdomen mesenteric lymphadenopathy. Largest lymph node 1 x 2 cm. IMPRESSION: Rudolph Crow is a 52 y.o. female with a past history remarkable for a remote history of Graves' disease status post radioactive iodine with medication induced hypothyroidism currently on Synthroid 125 mcg daily, had a subacute symptoms of right-sided flank and right abdominal pain that persisted and prompted a CT abdomen by the primary care physician, results revealed right sided ascending colonic wall thickening and an isolated 1 cm gretchen-mesenteric lymph node, referred for evaluation of of these findings along with her right side abdominal pain.     Patient states that abdominal pain was not associated the document can still have some errors including those of syntax and sound a like substitutions which may escape proof reading. It such instances, actual meaning can be extrapolated by contextual diversion.

## 2020-06-13 ENCOUNTER — HOSPITAL ENCOUNTER (OUTPATIENT)
Age: 50
Discharge: HOME | End: 2020-06-13
Payer: COMMERCIAL

## 2020-06-13 LAB
ABSOLUTE EOS #: 0.43 K/UL (ref 0–0.44)
ABSOLUTE IMMATURE GRANULOCYTE: <0.03 K/UL (ref 0–0.3)
ABSOLUTE LYMPH #: 2 K/UL (ref 1.1–3.7)
ABSOLUTE MONO #: 0.72 K/UL (ref 0.1–1.2)
ALBUMIN SERPL-MCNC: 4.3 G/DL (ref 3.5–5.2)
ALBUMIN/GLOBULIN RATIO: 1.7 (ref 1–2.5)
ALP BLD-CCNC: 78 U/L (ref 35–104)
ALT SERPL-CCNC: 28 U/L (ref 5–33)
ANION GAP SERPL CALCULATED.3IONS-SCNC: 12 MMOL/L (ref 9–17)
AST SERPL-CCNC: 35 U/L
BASOPHILS # BLD: 1 % (ref 0–2)
BASOPHILS ABSOLUTE: 0.05 K/UL (ref 0–0.2)
BILIRUB SERPL-MCNC: 0.76 MG/DL (ref 0.3–1.2)
BILIRUBIN DIRECT: 0.13 MG/DL
BILIRUBIN, INDIRECT: 0.63 MG/DL (ref 0–1)
BUN BLDV-MCNC: 15 MG/DL (ref 6–20)
BUN/CREAT BLD: ABNORMAL (ref 9–20)
C-REACTIVE PROTEIN: 1.6 MG/L (ref 0–5)
CALCIUM SERPL-MCNC: 9 MG/DL (ref 8.6–10.4)
CHLORIDE BLD-SCNC: 93 MMOL/L (ref 98–107)
CO2: 24 MMOL/L (ref 20–31)
CREAT SERPL-MCNC: 0.73 MG/DL (ref 0.5–0.9)
DIFFERENTIAL TYPE: ABNORMAL
EOSINOPHILS RELATIVE PERCENT: 6 % (ref 1–4)
GFR AFRICAN AMERICAN: >60 ML/MIN
GFR NON-AFRICAN AMERICAN: >60 ML/MIN
GFR SERPL CREATININE-BSD FRML MDRD: ABNORMAL ML/MIN/{1.73_M2}
GFR SERPL CREATININE-BSD FRML MDRD: ABNORMAL ML/MIN/{1.73_M2}
GLOBULIN: ABNORMAL G/DL (ref 1.5–3.8)
GLUCOSE BLD-MCNC: 104 MG/DL (ref 70–99)
HCT VFR BLD CALC: 43.1 % (ref 36.3–47.1)
HEMOGLOBIN: 14.6 G/DL (ref 11.9–15.1)
IMMATURE GRANULOCYTES: 0 %
LYMPHOCYTES # BLD: 26 % (ref 24–43)
MCH RBC QN AUTO: 31.5 PG (ref 25.2–33.5)
MCHC RBC AUTO-ENTMCNC: 33.9 G/DL (ref 28.4–34.8)
MCV RBC AUTO: 93.1 FL (ref 82.6–102.9)
MONOCYTES # BLD: 9 % (ref 3–12)
NRBC AUTOMATED: 0 PER 100 WBC
PDW BLD-RTO: 11.6 % (ref 11.8–14.4)
PLATELET # BLD: 349 K/UL (ref 138–453)
PLATELET ESTIMATE: ABNORMAL
PMV BLD AUTO: 9.2 FL (ref 8.1–13.5)
POTASSIUM SERPL-SCNC: 4.3 MMOL/L (ref 3.7–5.3)
RBC # BLD: 4.63 M/UL (ref 3.95–5.11)
RBC # BLD: ABNORMAL 10*6/UL
SEDIMENTATION RATE, ERYTHROCYTE: 1 MM (ref 0–20)
SEG NEUTROPHILS: 58 % (ref 36–65)
SEGMENTED NEUTROPHILS ABSOLUTE COUNT: 4.42 K/UL (ref 1.5–8.1)
SODIUM BLD-SCNC: 129 MMOL/L (ref 135–144)
TOTAL PROTEIN: 6.8 G/DL (ref 6.4–8.3)
WBC # BLD: 7.6 K/UL (ref 3.5–11.3)
WBC # BLD: ABNORMAL 10*3/UL

## 2020-06-13 PROCEDURE — 85025 COMPLETE CBC W/AUTO DIFF WBC: CPT

## 2020-06-13 PROCEDURE — 88346 IMFLUOR 1ST 1ANTB STAIN PX: CPT

## 2020-06-13 PROCEDURE — 80076 HEPATIC FUNCTION PANEL: CPT

## 2020-06-13 PROCEDURE — 80048 BASIC METABOLIC PNL TOTAL CA: CPT

## 2020-06-13 PROCEDURE — 82785 ASSAY OF IGE: CPT

## 2020-06-13 PROCEDURE — 86140 C-REACTIVE PROTEIN: CPT

## 2020-06-13 PROCEDURE — 83516 IMMUNOASSAY NONANTIBODY: CPT

## 2020-06-13 PROCEDURE — 83520 IMMUNOASSAY QUANT NOS NONAB: CPT

## 2020-06-13 PROCEDURE — 82784 ASSAY IGA/IGD/IGG/IGM EACH: CPT

## 2020-06-13 PROCEDURE — 88350 IMFLUOR EA ADDL 1ANTB STN PX: CPT

## 2020-06-13 PROCEDURE — 86003 ALLG SPEC IGE CRUDE XTRC EA: CPT

## 2020-06-13 PROCEDURE — 36415 COLL VENOUS BLD VENIPUNCTURE: CPT

## 2020-06-13 PROCEDURE — 82397 CHEMILUMINESCENT ASSAY: CPT

## 2020-06-13 PROCEDURE — 81479 UNLISTED MOLECULAR PATHOLOGY: CPT

## 2020-06-13 PROCEDURE — 85651 RBC SED RATE NONAUTOMATED: CPT

## 2020-06-15 LAB
GLIADIN DEAMINIDATED PEPTIDE AB IGA: 31 U/ML
GLIADIN DEAMINIDATED PEPTIDE AB IGG: 68 U/ML
IGA: 144 MG/DL (ref 70–400)
TISSUE TRANSGLUTAMINASE IGA: >128 U/ML

## 2020-06-16 LAB
ALLERGEN BARLEY IGE: <0.34 KU/L (ref 0–0.34)
ALLERGEN BEEF: <0.34 KU/L (ref 0–0.34)
ALLERGEN CABBAGE IGE: <0.34 KU/L (ref 0–0.34)
ALLERGEN CARROT IGE: <0.34 KU/L (ref 0–0.34)
ALLERGEN CHICKEN IGE: <0.34 KU/L (ref 0–0.34)
ALLERGEN CODFISH IGE: <0.34 KU/L (ref 0–0.34)
ALLERGEN CORN IGE: <0.34 KU/L (ref 0–0.34)
ALLERGEN COW MILK IGE: 0.39 KU/L (ref 0–0.34)
ALLERGEN CRAB IGE: <0.34 KU/L (ref 0–0.34)
ALLERGEN EGG WHITE IGE: <0.34 KU/L (ref 0–0.34)
ALLERGEN GRAPE IGE: <0.34 KU/L (ref 0–0.34)
ALLERGEN LETTUCE IGE: <0.34 KU/L (ref 0–0.34)
ALLERGEN NAVY BEAN: <0.34 KU/L (ref 0–0.34)
ALLERGEN OAT: <0.34 KU/L (ref 0–0.34)
ALLERGEN ORANGE IGE: <0.34 KU/L (ref 0–0.34)
ALLERGEN PEANUT (F13) IGE: 0.44 KU/L (ref 0–0.34)
ALLERGEN PEPPER C. ANNUUM IGE: <0.34 KU/L (ref 0–0.34)
ALLERGEN PORK: <0.34 KU/L (ref 0–0.34)
ALLERGEN RICE IGE: <0.34 KU/L (ref 0–0.34)
ALLERGEN RYE IGE: <0.34 KU/L (ref 0–0.34)
ALLERGEN SOYBEAN IGE: <0.34 KU/L (ref 0–0.34)
ALLERGEN TOMATO IGE: 0.6 KU/L (ref 0–0.34)
ALLERGEN TUNA IGE: <0.34 KU/L (ref 0–0.34)
ALLERGEN WHEAT IGE: <0.34 KU/L (ref 0–0.34)
IGE: 196 IU/ML
POTATO, IGE: <0.34 KU/L (ref 0–0.34)
SHRIMP: <0.34 KU/L (ref 0–0.34)

## 2020-06-19 ENCOUNTER — HOSPITAL ENCOUNTER (OUTPATIENT)
Dept: CT IMAGING | Facility: CLINIC | Age: 50
Discharge: HOME | End: 2020-06-21
Payer: COMMERCIAL

## 2020-06-19 PROCEDURE — 2580000003 HC RX 258: Performed by: INTERNAL MEDICINE

## 2020-06-19 PROCEDURE — 6360000004 HC RX CONTRAST MEDICATION: Performed by: INTERNAL MEDICINE

## 2020-06-19 PROCEDURE — 74177 CT ABD & PELVIS W/CONTRAST: CPT

## 2020-06-19 RX ORDER — SODIUM CHLORIDE 0.9 % (FLUSH) 0.9 %
10 SYRINGE (ML) INJECTION PRN
Status: DISCONTINUED | OUTPATIENT
Start: 2020-06-19 | End: 2020-06-22 | Stop reason: HOSPADM

## 2020-06-19 RX ORDER — 0.9 % SODIUM CHLORIDE 0.9 %
70 INTRAVENOUS SOLUTION INTRAVENOUS ONCE
Status: COMPLETED | OUTPATIENT
Start: 2020-06-19 | End: 2020-06-19

## 2020-06-19 RX ADMIN — Medication 10 ML: at 10:21

## 2020-06-19 RX ADMIN — IOHEXOL 50 ML: 240 INJECTION, SOLUTION INTRATHECAL; INTRAVASCULAR; INTRAVENOUS; ORAL at 10:31

## 2020-06-19 RX ADMIN — IOPAMIDOL 75 ML: 755 INJECTION, SOLUTION INTRAVENOUS at 10:31

## 2020-06-19 RX ADMIN — SODIUM CHLORIDE 70 ML: 9 INJECTION, SOLUTION INTRAVENOUS at 10:22

## 2020-06-21 LAB — IBD PANEL: NORMAL

## 2020-06-23 PROBLEM — K50.90 CROHN'S DISEASE (HCC): Status: ACTIVE | Noted: 2020-06-23

## 2020-06-29 ENCOUNTER — CLINICAL DOCUMENTATION (OUTPATIENT)
Dept: GASTROENTEROLOGY | Age: 50
End: 2020-06-29

## 2020-06-29 ENCOUNTER — TELEPHONE (OUTPATIENT)
Dept: GASTROENTEROLOGY | Age: 50
End: 2020-06-29

## 2020-06-30 ENCOUNTER — TELEPHONE (OUTPATIENT)
Dept: GASTROENTEROLOGY | Age: 50
End: 2020-06-30

## 2020-06-30 ENCOUNTER — OFFICE VISIT (OUTPATIENT)
Dept: GASTROENTEROLOGY | Age: 50
End: 2020-06-30
Payer: COMMERCIAL

## 2020-06-30 VITALS — BODY MASS INDEX: 23.74 KG/M2 | TEMPERATURE: 97.2 F | WEIGHT: 134 LBS | RESPIRATION RATE: 19 BRPM

## 2020-06-30 PROCEDURE — 99212 OFFICE O/P EST SF 10 MIN: CPT | Performed by: INTERNAL MEDICINE

## 2020-06-30 ASSESSMENT — ENCOUNTER SYMPTOMS
EYES NEGATIVE: 1
ABDOMINAL DISTENTION: 1
NAUSEA: 1
RESPIRATORY NEGATIVE: 1
ABDOMINAL PAIN: 1

## 2020-07-01 RX ORDER — SODIUM, POTASSIUM,MAG SULFATES 17.5-3.13G
1 SOLUTION, RECONSTITUTED, ORAL ORAL ONCE
Qty: 1 BOTTLE | Refills: 0 | Status: SHIPPED | OUTPATIENT
Start: 2020-07-01 | End: 2020-07-01

## 2020-07-01 NOTE — TELEPHONE ENCOUNTER
Talked to Marylin Levy regarding scheduling. She is now scheduled [facility] [place] Friday 07/10/20 @ 8:30 am egd colon Suprep Dr Judy Sprague. Covid-19 test requested. Bowel prep instructions emailed to [email or web address]

## 2020-07-02 ENCOUNTER — HOSPITAL ENCOUNTER (OUTPATIENT)
Age: 50
Discharge: HOME | End: 2020-07-02
Payer: COMMERCIAL

## 2020-07-02 LAB
FOLATE: 11.9 NG/ML
VITAMIN B-12: 376 PG/ML (ref 232–1245)
VITAMIN D 25-HYDROXY: 28.3 NG/ML (ref 30–100)

## 2020-07-02 PROCEDURE — 82607 VITAMIN B-12: CPT

## 2020-07-02 PROCEDURE — 84597 ASSAY OF VITAMIN K: CPT

## 2020-07-02 PROCEDURE — 36415 COLL VENOUS BLD VENIPUNCTURE: CPT

## 2020-07-02 PROCEDURE — 82306 VITAMIN D 25 HYDROXY: CPT

## 2020-07-02 PROCEDURE — 82746 ASSAY OF FOLIC ACID SERUM: CPT

## 2020-07-02 PROCEDURE — 84590 ASSAY OF VITAMIN A: CPT

## 2020-07-02 PROCEDURE — 84446 ASSAY OF VITAMIN E: CPT

## 2020-07-05 LAB
RETINYL PALMITATE: <0.02 MG/L (ref 0–0.1)
VITAMIN A LEVEL: 0.53 MG/L (ref 0.3–1.2)
VITAMIN A, INTERP: NORMAL
VITAMIN K: 1 NMOL/L (ref 0.22–4.88)

## 2020-07-06 LAB
ALPHA-TOCOPHEROL: 12.7 MG/L (ref 5.5–18)
GAMMA-TOCOPHEROL: 0.7 MG/L (ref 0–6)

## 2020-07-07 ENCOUNTER — HOSPITAL ENCOUNTER (OUTPATIENT)
Dept: PREADMISSION TESTING | Age: 50
Setting detail: SPECIMEN
Discharge: HOME | End: 2020-07-11
Payer: COMMERCIAL

## 2020-07-07 PROCEDURE — U0004 COV-19 TEST NON-CDC HGH THRU: HCPCS

## 2020-07-08 LAB
SARS-COV-2, PCR: NOT DETECTED
SARS-COV-2, RAPID: NORMAL
SARS-COV-2: NORMAL
SOURCE: NORMAL

## 2020-07-09 ENCOUNTER — ANESTHESIA EVENT (OUTPATIENT)
Dept: ENDOSCOPY | Age: 50
End: 2020-07-09
Payer: COMMERCIAL

## 2020-07-09 ENCOUNTER — TELEPHONE (OUTPATIENT)
Dept: PRIMARY CARE CLINIC | Age: 50
End: 2020-07-09

## 2020-07-10 ENCOUNTER — ANESTHESIA (OUTPATIENT)
Dept: ENDOSCOPY | Age: 50
End: 2020-07-10
Payer: COMMERCIAL

## 2020-07-10 ENCOUNTER — HOSPITAL ENCOUNTER (OUTPATIENT)
Age: 50
Setting detail: OUTPATIENT SURGERY
Discharge: HOME | End: 2020-07-10
Attending: INTERNAL MEDICINE | Admitting: INTERNAL MEDICINE
Payer: COMMERCIAL

## 2020-07-10 VITALS
TEMPERATURE: 97.2 F | SYSTOLIC BLOOD PRESSURE: 111 MMHG | BODY MASS INDEX: 23.92 KG/M2 | OXYGEN SATURATION: 99 % | DIASTOLIC BLOOD PRESSURE: 78 MMHG | HEIGHT: 63 IN | HEART RATE: 55 BPM | RESPIRATION RATE: 12 BRPM | WEIGHT: 135 LBS

## 2020-07-10 VITALS
OXYGEN SATURATION: 98 % | RESPIRATION RATE: 14 BRPM | SYSTOLIC BLOOD PRESSURE: 86 MMHG | DIASTOLIC BLOOD PRESSURE: 65 MMHG

## 2020-07-10 PROCEDURE — 88305 TISSUE EXAM BY PATHOLOGIST: CPT

## 2020-07-10 PROCEDURE — 3609010300 HC COLONOSCOPY W/BIOPSY SINGLE/MULTIPLE: Performed by: INTERNAL MEDICINE

## 2020-07-10 PROCEDURE — 7100000011 HC PHASE II RECOVERY - ADDTL 15 MIN: Performed by: INTERNAL MEDICINE

## 2020-07-10 PROCEDURE — 6360000002 HC RX W HCPCS: Performed by: NURSE ANESTHETIST, CERTIFIED REGISTERED

## 2020-07-10 PROCEDURE — 2580000003 HC RX 258: Performed by: INTERNAL MEDICINE

## 2020-07-10 PROCEDURE — 3700000001 HC ADD 15 MINUTES (ANESTHESIA): Performed by: INTERNAL MEDICINE

## 2020-07-10 PROCEDURE — 3700000000 HC ANESTHESIA ATTENDED CARE: Performed by: INTERNAL MEDICINE

## 2020-07-10 PROCEDURE — 2500000003 HC RX 250 WO HCPCS: Performed by: NURSE ANESTHETIST, CERTIFIED REGISTERED

## 2020-07-10 PROCEDURE — 45380 COLONOSCOPY AND BIOPSY: CPT | Performed by: INTERNAL MEDICINE

## 2020-07-10 PROCEDURE — 3609012400 HC EGD TRANSORAL BIOPSY SINGLE/MULTIPLE: Performed by: INTERNAL MEDICINE

## 2020-07-10 PROCEDURE — 7100000010 HC PHASE II RECOVERY - FIRST 15 MIN: Performed by: INTERNAL MEDICINE

## 2020-07-10 PROCEDURE — 2709999900 HC NON-CHARGEABLE SUPPLY: Performed by: INTERNAL MEDICINE

## 2020-07-10 PROCEDURE — 43239 EGD BIOPSY SINGLE/MULTIPLE: CPT | Performed by: INTERNAL MEDICINE

## 2020-07-10 RX ORDER — PROPOFOL 10 MG/ML
INJECTION, EMULSION INTRAVENOUS PRN
Status: DISCONTINUED | OUTPATIENT
Start: 2020-07-10 | End: 2020-07-10 | Stop reason: SDUPTHER

## 2020-07-10 RX ORDER — LIDOCAINE HYDROCHLORIDE 10 MG/ML
INJECTION, SOLUTION EPIDURAL; INFILTRATION; INTRACAUDAL; PERINEURAL PRN
Status: DISCONTINUED | OUTPATIENT
Start: 2020-07-10 | End: 2020-07-10 | Stop reason: SDUPTHER

## 2020-07-10 RX ORDER — PROPOFOL 10 MG/ML
INJECTION, EMULSION INTRAVENOUS CONTINUOUS PRN
Status: DISCONTINUED | OUTPATIENT
Start: 2020-07-10 | End: 2020-07-10 | Stop reason: SDUPTHER

## 2020-07-10 RX ORDER — SODIUM CHLORIDE 9 MG/ML
INJECTION, SOLUTION INTRAVENOUS CONTINUOUS
Status: DISCONTINUED | OUTPATIENT
Start: 2020-07-10 | End: 2020-07-10 | Stop reason: HOSPADM

## 2020-07-10 RX ADMIN — PROPOFOL 75 MCG/KG/MIN: 10 INJECTION, EMULSION INTRAVENOUS at 08:53

## 2020-07-10 RX ADMIN — LIDOCAINE HYDROCHLORIDE 50 MG: 10 INJECTION, SOLUTION EPIDURAL; INFILTRATION; INTRACAUDAL; PERINEURAL at 08:53

## 2020-07-10 RX ADMIN — SODIUM CHLORIDE: 9 INJECTION, SOLUTION INTRAVENOUS at 07:46

## 2020-07-10 RX ADMIN — PROPOFOL 100 MG: 10 INJECTION, EMULSION INTRAVENOUS at 08:53

## 2020-07-10 ASSESSMENT — PAIN SCALES - GENERAL
PAINLEVEL_OUTOF10: 0

## 2020-07-10 ASSESSMENT — PAIN - FUNCTIONAL ASSESSMENT: PAIN_FUNCTIONAL_ASSESSMENT: 0-10

## 2020-07-10 NOTE — OP NOTE
[facility]    [facility] ENDOSCOPY     EGD    PROCEDURE DATE: 07/10/20    REFERRING PHYSICIAN: No ref. provider found     PRIMARY CARE PROVIDER: Radha Villela MD    ATTENDING PHYSICIAN: Omid Navarro MD     HISTORY: Ms. Neema Roger is a 52 y.o. female who presents to the [facility] Endoscopy unit for upper endoscopy. The patient's clinical history is remarkable for new diagnosis of celiac disease, abnormal bowel frequency, abnormal CT imaging showing non-specific thickening of the right colon/hepatic flexure, referred for EGD and Colonoscopy. She is currently medically stable and appropriate for the planned procedure. PREOPERATIVE DIAGNOSIS: Celiac Disease     PROCEDURES:   1) Transoral Upper Endoscopy with cold biopsy of the duodenum and stomach. POSTOPERATIVE DIAGNOSIS:     1) Normal appearing esophagus and GEJ  2) Scattered areas of erythema identified in the antrum to suggest non-specific antritis, s/p cold biopsy to evaluate for H. Pylori  3) Evidence of duodenal scalloping in D2 and D3, s/p cold biopsy toe evaluate for presence of celiac disease and to identify MARSH criteria severity. MEDICATIONS:   MAC per anesthesia     EBL: <10cc    INSTRUMENT: Olympus GIF-H180  flexible Gastroscope. PREPARATION: The nature and character of the procedure as well as risks, benefits, and alternatives were discussed with the patient and informed consent was obtained. Complications were said to include, but were not limited to: medication allergy, medication reaction, cardiovascular and respiratory problems, bleeding, perforation, infection, and/or missed diagnosis. Following arrival in the endoscopy room, the patient was placed in the left lateral decubitus position and final time-out accomplished in the presence of the nursing staff. Baseline vital signs were obtained and reviewed, and IV sedation was subsequently initiated. FINDINGS:   Esophagus:  The esophagus was inspected to the flexure and right colon. PROCEDURES:   Transanal Colonoscopy with biopsy, polypectomy (cold biopsy). POSTPROCEDURE DIAGNOSIS:    FAIR PREP    1) No gross evidence of mucosal colitis (hyperemia, erythema, etc) on the right colon. No large lesions or polyps identified. No large masses identified. Normal appearing terminal ileum. Random biopsies obtained of the cecum, ascending colon, hepatic flexure, and proximal transverse colon. 2) Small 5mm polyp in the proximal ascending colon s/p cold biopsy and removal  3) Small 5mm polyp in the mid-transverse colon s/p cold biopsy and removal  4)Small internal hemorrhoids    MEDICATIONS:     MAC per anesthesia     EBL <10cc    INSTRUMENT: Olympus CF-H180 AL Pediatric flexible Colonoscope. PREPARATION: The nature and character of the procedure as well as risks, benefits, and alternatives were discussed with the patient and informed consent was obtained. Complications were said to include, but were not limited to: medication allergy, medication reaction, cardiovascular and respiratory problems, bleeding, perforation, infection, and/or missed diagnosis. Following arrival in the endoscopy room, the patient was placed in the left lateral decubitus position and final time-out accomplished in the presence of the nursing staff. Baseline vital signs were obtained and reviewed, and IV sedation was subsequently initiated. FINDINGS: Rectal examination demonstrated no significant visible external abnormality and digital palpation was unremarkable. Following adequate conscious sedation the colonoscope was introduced and advanced under direct visualization to the cecum, which was identified by the ileocecal valve and appendiceal orifice. The bowel preparation was felt to be FAIR. This included moderate amounts of green stool that was mostly able to be adequately irrigated and aspirated. Cecal intubation time was 9 minutes.      Once maximally inserted, the endoscope was withdrawn and the mucosa was carefully inspected. The mucosal exam was revealed no gross evidence of colitis, 2 small polyps on the right colon s/p cold biopsy and removal. Retroflexion was performed in the rectum and small internal hemorrhoids. Withdrawal time was 23 minutes. IMPRESSION:     FAIR PREP    1) No gross evidence of mucosal colitis (hyperemia, erythema, etc) on the right colon. No large lesions or polyps identified. No large masses identified. Normal appearing terminal ileum. Random biopsies obtained of the cecum, ascending colon, hepatic flexure, and proximal transverse colon. 2) Small 5mm polyp in the proximal ascending colon s/p cold biopsy and removal  3) Small 5mm polyp in the mid-transverse colon s/p cold biopsy and removal  4)Small internal hemorrhoids    RECOMMENDATIONS:   1) Follow up with referring provider, as previously scheduled. 2) Repeat Colonoscopy based on path findings      James Rosado MD  [facility]   07/10/20    this note is created with the assistance of a speech recognition program.  While intending to generate a document that actually reflects the content of the visit, the document can still have some errors including those of syntax and sound a like substitutions which may escape proof reading. It such instances, actual meaning can be extrapolated by contextual diversion.

## 2020-07-10 NOTE — ANESTHESIA POSTPROCEDURE EVALUATION
Department of Anesthesiology  Postprocedure Note    Patient: Ricarda Qiu  MRN: [id number]  YOB: 1970  Date of evaluation: 7/10/2020  Time:  12:18 PM     Procedure Summary     Date:  07/10/20 Room / Location:  [address]    Anesthesia Start:  0848 Anesthesia Stop:  6487    Procedures:       EGD BIOPSY (N/A )      COLONOSCOPY WITH BIOPSY (N/A ) Diagnosis:  (CELIAC DISEASE)    Surgeon:  Sage Mayfield MD Responsible Provider:  Eduar Ingram MD    Anesthesia Type:  general, MAC ASA Status:  2          Anesthesia Type: general, MAC    Anastasia Phase I: Anastasia Score: 10    Anastasia Phase II: Anastasia Score: 10    Last vitals: Reviewed and per EMR flowsheets.        Anesthesia Post Evaluation    Patient location during evaluation: PACU  Patient participation: complete - patient participated  Level of consciousness: awake and alert  Pain score: 1  Airway patency: patent  Nausea & Vomiting: no nausea and no vomiting  Complications: no  Cardiovascular status: blood pressure returned to baseline  Respiratory status: acceptable  Hydration status: euvolemic

## 2020-07-10 NOTE — ANESTHESIA PRE PROCEDURE
of last liquid consumption: 2330                        Time of last solid consumption: 1600                        Date of last liquid consumption: 07/09/20                        Date of last solid food consumption: 07/08/20    BMI:   Wt Readings from Last 3 Encounters:   07/10/20 135 lb (61.2 kg)   06/30/20 134 lb (60.8 kg)   06/12/20 136 lb (61.7 kg)     Body mass index is 23.91 kg/m². CBC:   Lab Results   Component Value Date    WBC 7.6 06/13/2020    RBC 4.63 06/13/2020    HGB 14.6 06/13/2020    HCT 43.1 06/13/2020    MCV 93.1 06/13/2020    RDW 11.6 06/13/2020     06/13/2020       CMP:   Lab Results   Component Value Date     06/13/2020    K 4.3 06/13/2020    CL 93 06/13/2020    CO2 24 06/13/2020    BUN 15 06/13/2020    CREATININE 0.73 06/13/2020    GFRAA >60 06/13/2020    LABGLOM >60 06/13/2020    GLUCOSE 104 06/13/2020    PROT 6.8 06/13/2020    CALCIUM 9.0 06/13/2020    BILITOT 0.76 06/13/2020    ALKPHOS 78 06/13/2020    AST 35 06/13/2020    ALT 28 06/13/2020       POC Tests: No results for input(s): POCGLU, POCNA, POCK, POCCL, POCBUN, POCHEMO, POCHCT in the last 72 hours.     Coags: No results found for: PROTIME, INR, APTT    HCG (If Applicable): No results found for: PREGTESTUR, PREGSERUM, HCG, HCGQUANT     ABGs: No results found for: PHART, PO2ART, YVH4ONW, CTJ9UJF, BEART, S8AEAXIX     Type & Screen (If Applicable):  No results found for: LABABO, LABRH    Drug/Infectious Status (If Applicable):  No results found for: HIV, HEPCAB    COVID-19 Screening (If Applicable):   Lab Results   Component Value Date    COVID19 Not Detected 07/07/2020         Anesthesia Evaluation  Patient summary reviewed and Nursing notes reviewed no history of anesthetic complications:   Airway: Mallampati: II  TM distance: >3 FB   Neck ROM: full   Dental: normal exam         Pulmonary:Negative Pulmonary ROS and normal exam                               Cardiovascular:Negative CV ROS  Exercise tolerance: good (>4 METS),                     Neuro/Psych:   Negative Neuro/Psych ROS              GI/Hepatic/Renal: Neg GI/Hepatic/Renal ROS           ROS comment: crohns. Endo/Other:    (+) hypothyroidism::., no malignancy/cancer. (-) diabetes mellitus, hyperthyroidism, blood dyscrasia, arthritis, no electrolyte abnormalities, no malignancy/cancer               Abdominal:           Vascular: negative vascular ROS. Anesthesia Plan      general and MAC     ASA 2       Induction: intravenous. MIPS: Postoperative opioids intended and Prophylactic antiemetics administered. Anesthetic plan and risks discussed with patient. Use of blood products discussed with patient whom. Plan discussed with CRNA.     Attending anesthesiologist reviewed and agrees with Pre Eval content              Andrea Chew RN   7/10/2020

## 2020-07-10 NOTE — H&P
History and Physical    Pt Name: Yumi Turcios  MRN: [id number]  YOB: 1970  Date of evaluation: 7/10/2020    SUBJECTIVE:   History of Chief Complaint:    Patient presents for both EGD and colonoscopy. Patient says that she was recently diagnosed with Celiac disease. She has abdominal pain, scheduled for EGD today, her first.  Patient also has had abnormality on CT scan, Crohn's vs colitis changes noted. She has been scheduled now for colonoscopy as well. Past Medical History    has a past medical history of Abdominal pain, Anxiety, Hypothyroidism, and Wears glasses. Past Surgical History   has a past surgical history that includes  section (G2, ) and pr lap,cholecystectomy (N/A, 2018). Medications  Prior to Admission medications    Medication Sig Start Date End Date Taking? Authorizing Provider   SYNTHROID 125 MCG tablet  16  Yes Historical Provider, MD     Allergies  has No Known Allergies. Family History  family history includes Cancer in her father; Other in her maternal aunt and sister. Social History   reports that she has never smoked. She has never used smokeless tobacco.   reports current alcohol use. reports no history of drug use. Marital Status   Occupation dental office    OBJECTIVE:   VITALS:  height is 5' 3\" (1.6 m) and weight is 135 lb (61.2 kg). Her temporal temperature is 97.2 °F (36.2 °C). Her blood pressure is 123/87 and her pulse is 64. Her respiration is 12 and oxygen saturation is 98%. CONSTITUTIONAL:alert & oriented x 3, no acute distress. Very pleasant. SKIN:  Warm and dry, no rashes on exposed areas of skin. HEAD:  Normocephalic, atraumatic. EYES: PERRL. EOMs intact. EARS:  Hearing grossly WNL. NOSE:  Nares patent. No rhinorrhea. MOUTH/THROAT:  benign  NECK:supple, no lymphadenopathy  LUNGS: Clear to auscultation bilaterally, no wheezes.   CARDIOVASCULAR: Heart sounds are normal.  Regular rate and rhythm without murmur. ABDOMEN: soft, non tender, non distended. EXTREMITIES: no edema bilateral lower extremities. IMPRESSIONS:   1. Abdominal pain  2.  has a past medical history of Abdominal pain, Anxiety, Hypothyroidism, and Wears glasses.    PLANS:   1. EGD; colonoscopy    JASON RAMÍREZ PA-C  Electronically signed 7/10/2020 at 7:46 AM

## 2020-07-13 LAB — SURGICAL PATHOLOGY REPORT: NORMAL

## 2020-07-24 ENCOUNTER — HOSPITAL ENCOUNTER (OUTPATIENT)
Dept: MAMMOGRAPHY | Age: 50
Discharge: HOME | End: 2020-07-26
Payer: COMMERCIAL

## 2020-07-24 PROCEDURE — 77080 DXA BONE DENSITY AXIAL: CPT

## 2020-07-28 ENCOUNTER — OFFICE VISIT (OUTPATIENT)
Dept: GASTROENTEROLOGY | Age: 50
End: 2020-07-28
Payer: COMMERCIAL

## 2020-07-28 VITALS — BODY MASS INDEX: 24.09 KG/M2 | WEIGHT: 136 LBS | RESPIRATION RATE: 18 BRPM | TEMPERATURE: 97.6 F

## 2020-07-28 PROCEDURE — 99213 OFFICE O/P EST LOW 20 MIN: CPT | Performed by: INTERNAL MEDICINE

## 2020-07-28 ASSESSMENT — ENCOUNTER SYMPTOMS
ABDOMINAL PAIN: 1
RESPIRATORY NEGATIVE: 1
EYES NEGATIVE: 1

## 2020-07-28 NOTE — PROGRESS NOTES
GI FOLLOW UP    INTERVAL HISTORY:       Status post EGD with duodenal biopsy showing findings consistent with celiac disease  DEXA scan obtained which shows findings consistent with osteoporosis    Chief Complaint   Patient presents with    Follow-up     EGD/Colon follow up. Patient states she is starting to feel better. She states her bloating has subsided. Since starting a gluten free diet 3 weeks ago she states doing better    Other     Patient wants to go over other labs           HISTORY OF PRESENT ILLNESS: Ms.Julie SULLY Daniels is a 52 y. o. female with a past history remarkable for a remote history of Graves' disease status post radioactive iodine with medication induced hypothyroidism currently on Synthroid 125 mcg daily, had a subacute symptoms of right-sided flank and right abdominal pain that persisted and prompted a CT abdomen by the primary care physician, results revealed right sided ascending colonic wall thickening and an isolated 1 cm gretchen-mesenteric lymph node, referred for evaluation of of these findings along with her right side abdominal pain.     Patient states that abdominal pain was not associated with any bowel movements or p.o. intake.  Patient has not has not noted any obvious dietary triggers, no prior history of similar presentation, no sick contacts. Estuardo Khan has not had any diarrhea or constipation and has yet to have any upper GI symptoms.  Patient denies any changes in weight or blood in her stool.     On May 26 the patient had reported that her right-sided abdominal symptoms was significantly improved and resolved.        No Smoking  Social drinking  No illicit drugs  FH- No GI issues in the family     Past Medical,Family, and Social History reviewed and does contribute to the patient presenting condition.     Patient's PMH/PSH,SH,PSYCH Hx, MEDs, ALLERGIES, and ROS were all reviewed and updated in the appropriate sections.     PAST MEDICAL HISTORY:  Past Medical History:   Diagnosis Date    Abdominal pain     Anxiety     Hypothyroidism     Wears glasses        Past Surgical History:   Procedure Laterality Date     SECTION  G2, 2007    COLONOSCOPY N/A 7/10/2020    COLONOSCOPY WITH BIOPSY performed by Iqra Raymond MD at [facility]    MO LAP,CHOLECYSTECTOMY N/A 2018    XI ROBOTIC LAPAROSCOPIC CHOLECYSTECTOMY, performed by Andrew Valdez IV, DO at [facility] N/A 7/10/2020    EGD BIOPSY performed by Iqra Raymond MD at [facility]       CURRENT MEDICATIONS:    Current Outpatient Medications:     SYNTHROID 125 MCG tablet, , Disp: , Rfl:     ALLERGIES:   No Known Allergies    FAMILY HISTORY:       Problem Relation Age of Onset    Cancer Father     Other Sister         cerebral hemmorage    Other Maternal Aunt         cerebral hemmorage         SOCIAL HISTORY:   Social History     Socioeconomic History    Marital status:      Spouse name: Not on file    Number of children: Not on file    Years of education: Not on file    Highest education level: Not on file   Occupational History    Not on file   Social Needs    Financial resource strain: Not on file    Food insecurity     Worry: Not on file     Inability: Not on file    Transportation needs     Medical: Not on file     Non-medical: Not on file   Tobacco Use    Smoking status: Never Smoker    Smokeless tobacco: Never Used   Substance and Sexual Activity    Alcohol use: Yes     Comment: wine a few times a week    Drug use: No    Sexual activity: Yes     Partners: Male   Lifestyle    Physical activity     Days per week: Not on file     Minutes per session: Not on file    Stress: Not on file   Relationships    Social connections     Talks on phone: Not on file     Gets together: Not on file     Attends Judaism service: Not on file     Active member of club or organization: Not on file     Attends meetings of clubs or organizations: Not on file     Relationship status: Not on file    Intimate partner violence     Fear of current or ex partner: Not on file     Emotionally abused: Not on file     Physically abused: Not on file     Forced sexual activity: Not on file   Other Topics Concern    Not on file   Social History Narrative    Not on file       REVIEW OF SYSTEMS: A 12-point review of systems was obtained and pertinent positives and negatives were listed below. REVIEW OF SYSTEMS:     Constitutional: No fever, no chills, no lethargy, no weakness. HEENT:  No headache, otalgia, itchy eyes, nasal discharge or sore throat. Cardiac:  No chest pain, dyspnea, orthopnea or PND. Chest:   No cough, phlegm or wheezing. Abdomen:      Detailed by MA   Neuro:  No focal weakness, abnormal movements or seizure like activity. Skin:   No rashes, no itching. :   No hematuria, no pyuria, no dysuria, no flank pain. Extremities:  No swelling or joint pains. ROS was otherwise negative    Review of Systems    PHYSICAL EXAMINATION: Vital signs reviewed per the nursing documentation. Temp 97.6 °F (36.4 °C)   Resp 18   Wt 136 lb (61.7 kg)   LMP 04/22/2016 (Approximate)   BMI 24.09 kg/m²   Body mass index is 24.09 kg/m². Physical Exam    Physical Exam   Constitutional: Patient is oriented to person, place, and time. Patient appears well-developed and well-nourished. HENT:   Head: Normocephalic and atraumatic. Eyes: Pupils are equal, round, and reactive to light. EOM are normal.   Neck: Normal range of motion. Neck supple. No JVD present. No tracheal deviation present. No thyromegaly present. Cardiovascular: Normal rate, regular rhythm, normal heart sounds and intact distal pulses. Pulmonary/Chest: Effort normal and breath sounds normal. No stridor. No respiratory distress. He has no wheezes. He has no rales. He exhibits no tenderness. Abdominal: Soft. Bowel sounds are normal. He exhibits no distension and no mass. There is no tenderness. There is no rebound and no guarding. No hernia. Musculoskeletal: Normal range of motion. Lymphadenopathy:    Patient has no cervical adenopathy. Neurological: Patient is alert and oriented to person, place, and time. Psychiatric: Patient has a normal mood and affect. Patient behavior is normal.       LABORATORY DATA: Reviewed  Lab Results   Component Value Date    WBC 7.6 06/13/2020    HGB 14.6 06/13/2020    HCT 43.1 06/13/2020    MCV 93.1 06/13/2020     06/13/2020     (L) 06/13/2020    K 4.3 06/13/2020    CL 93 (L) 06/13/2020    CO2 24 06/13/2020    BUN 15 06/13/2020    CREATININE 0.73 06/13/2020    LABALBU 4.3 06/13/2020    BILITOT 0.76 06/13/2020    ALKPHOS 78 06/13/2020    AST 35 (H) 06/13/2020    ALT 28 06/13/2020         Lab Results   Component Value Date    RBC 4.63 06/13/2020    HGB 14.6 06/13/2020    MCV 93.1 06/13/2020    MCH 31.5 06/13/2020    MCHC 33.9 06/13/2020    RDW 11.6 (L) 06/13/2020    MPV 9.2 06/13/2020    BASOPCT 1 06/13/2020    LYMPHSABS 2.00 06/13/2020    MONOSABS 0.72 06/13/2020    NEUTROABS 4.42 06/13/2020    EOSABS 0.43 06/13/2020    BASOSABS 0.05 06/13/2020         DIAGNOSTIC TESTING:     Dexa Bone Density Axial Skeleton    Result Date: 7/24/2020  EXAMINATION: BONE DENSITOMETRY 7/24/2020 7:55 am TECHNIQUE: A bone density dual x-ray absorptiometry (DEXA) scan was performed of the lumbar spine and left hip  on a [employer] system. COMPARISON: None. HISTORY: ORDERING SYSTEM PROVIDED HISTORY: Celiac disease TECHNOLOGIST PROVIDED HISTORY: new diagnosis of celiac disease Is the patient pregnant?->No FINDINGS: LUMBAR SPINE: The bone mineral density in the lumbar spine including the L1-L4 levels is measured at 0.878 g/cm2, which corresponds to a T-score of -2.5 and a Z-score of -2.1. This is within the osteoporosis range by WHO criteria.  LEFT HIP: The bone mineral density in the total hip is measured at 0.889 g/cm2 corresponding to a T-score of -0.9 and a Z-score of -0.4. This is within the normal range by WHO criteria. The bone mineral density of the femoral neck is measured at 0.856 g/cm2 corresponding to a T-score of -1.3 and a Z-score of -0.5. This is within the osteopenia range by WHO criteria. If the patient is postmenopausal the bone mineral density is consistent with osteoporosis by WHO criteria. If the patient is premenopausal the bone mineral density is significantly reduced when compared to age match controls. [place] [facility]     [facility] ENDOSCOPY      EGD     PROCEDURE DATE: 07/10/20     REFERRING PHYSICIAN: No ref. provider found      PRIMARY CARE PROVIDER: Smith Manuel MD     ATTENDING PHYSICIAN: Shannon Thakkar MD      HISTORY: Ms. Sherly De Leon is a 52 y.o. female who presents to the [facility] Endoscopy unit for upper endoscopy. The patient's clinical history is remarkable for new diagnosis of celiac disease, abnormal bowel frequency, abnormal CT imaging showing non-specific thickening of the right colon/hepatic flexure, referred for EGD and Colonoscopy.  She is currently medically stable and appropriate for the planned procedure.         PREOPERATIVE DIAGNOSIS: Celiac Disease      PROCEDURES:   1) Transoral Upper Endoscopy with cold biopsy of the duodenum and stomach.      POSTOPERATIVE DIAGNOSIS:      1) Normal appearing esophagus and GEJ  2) Scattered areas of erythema identified in the antrum to suggest non-specific antritis, s/p cold biopsy to evaluate for H. Pylori  3) Evidence of duodenal scalloping in D2 and D3, s/p cold biopsy toe evaluate for presence of celiac disease and to identify MARSH criteria severity.     MEDICATIONS:   MAC per anesthesia      EBL: <10cc     INSTRUMENT: Olympus GIF-H180  flexible Gastroscope.      PREPARATION: The nature and character of the procedure as well as risks, benefits, and alternatives were discussed with the contextual diversion.                [place] [facility]      [facility]      COLONOSCOPY     PROCEDURE DATE: 07/10/20     REFERRING PHYSICIAN: No ref. provider found      PRIMARY CARE PROVIDER: Crystal Castañeda MD     ATTENDING PHYSICIAN: James Rosado MD      HISTORY: Ms. Saul Hall is a 52 y.o. female who presents to the [facility] endoscopy unit for colonoscopy. The patient's clinical history is remarkable for history as detailed above. She is currently medically stable and appropriate for the planned procedure.         PREOPERATIVE DIAGNOSIS: Abnormal thickening of the hepatic flexure and right colon.      PROCEDURES:   Transanal Colonoscopy with biopsy, polypectomy (cold biopsy).      POSTPROCEDURE DIAGNOSIS:     FAIR PREP     1) No gross evidence of mucosal colitis (hyperemia, erythema, etc) on the right colon. No large lesions or polyps identified. No large masses identified. Normal appearing terminal ileum. Random biopsies obtained of the cecum, ascending colon, hepatic flexure, and proximal transverse colon. 2) Small 5mm polyp in the proximal ascending colon s/p cold biopsy and removal  3) Small 5mm polyp in the mid-transverse colon s/p cold biopsy and removal  4)Small internal hemorrhoids     MEDICATIONS:      MAC per anesthesia      EBL <10cc     INSTRUMENT: Olympus CF-H180 AL Pediatric flexible Colonoscope.      PREPARATION: The nature and character of the procedure as well as risks, benefits, and alternatives were discussed with the patient and informed consent was obtained. Complications were said to include, but were not limited to: medication allergy, medication reaction, cardiovascular and respiratory problems, bleeding, perforation, infection, and/or missed diagnosis. Following arrival in the endoscopy room, the patient was placed in the left lateral decubitus position and final time-out accomplished in the presence of the nursing staff.  Baseline vital signs were obtained and reviewed, and IV sedation was subsequently initiated.         FINDINGS: Rectal examination demonstrated no significant visible external abnormality and digital palpation was unremarkable. Following adequate conscious sedation the colonoscope was introduced and advanced under direct visualization to the cecum, which was identified by the ileocecal valve and appendiceal orifice. The bowel preparation was felt to be FAIR. This included moderate amounts of green stool that was mostly able to be adequately irrigated and aspirated. Cecal intubation time was 9 minutes.      Once maximally inserted, the endoscope was withdrawn and the mucosa was carefully inspected. The mucosal exam was revealed no gross evidence of colitis, 2 small polyps on the right colon s/p cold biopsy and removal. Retroflexion was performed in the rectum and small internal hemorrhoids. Withdrawal time was 23 minutes.         IMPRESSION:      FAIR PREP     1) No gross evidence of mucosal colitis (hyperemia, erythema, etc) on the right colon. No large lesions or polyps identified. No large masses identified. Normal appearing terminal ileum. Random biopsies obtained of the cecum, ascending colon, hepatic flexure, and proximal transverse colon. 2) Small 5mm polyp in the proximal ascending colon s/p cold biopsy and removal  3) Small 5mm polyp in the mid-transverse colon s/p cold biopsy and removal  4)Small internal hemorrhoids     RECOMMENDATIONS:   1) Follow up with referring provider, as previously scheduled.    2) Repeat Colonoscopy based on path findings        [facility]   07/10/20     this note is created with the assistance of a speech recognition program.  While intending to generate a document that actually reflects the content of the visit, the document can still have some errors including those of syntax and sound a like substitutions which may escape proof reading.  It such instances, actual meaning can be extrapolated by contextual diversion. 1.  DUODENAL BIOPSIES:  SMALL INTESTINAL MUCOSA WITH CHRONIC   INFLAMMATION, VILLOUS ATROPHY AND CRYPT HYPERPLASIA; MORPHOLOGIC   FEATURES COMPATIBLE WITH CELIAC DISEASE. 2.  STOMACH BIOPSIES:  MODERATE TO SEVERE CHRONIC ACTIVE GASTRITIS. NEGATIVE FOR HELICOBACTER PYLORI ON IMMUNOSTAIN. 3.  CECUM BIOPSY:  NORMAL COLONIC MUCOSA. 4.  ASCENDING COLON POLYP BIOPSY: SERRATED ADENOMA. 5.  ASCENDING COLON BIOPSIES:  NORMAL COLONIC MUCOSA. 6.  HEPATIC FLEXURE BIOPSY:  NORMAL COLONIC MUCOSA. 7.  PROXIMAL TRANSVERSE COLON BIOPSY:  NORMAL COLONIC MUCOSA. 8.  TRANSVERSE COLON POLYP BIOPSY: [address] IMPRESSION: Ms.Julie SULLY Daniels is a 52 y. o. female with a past history remarkable for a remote history of Graves' disease status post radioactive iodine with medication induced hypothyroidism currently on Synthroid 125 mcg daily, had a subacute symptoms of right-sided flank and right abdominal pain that persisted and prompted a CT abdomen by the primary care physician, results revealed right sided ascending colonic wall thickening and an isolated 1 cm gretchen-mesenteric lymph node, referred for evaluation of of these findings along with her right side abdominal pain.     Patient states that abdominal pain was not associated with any bowel movements or p.o. intake.  Patient has not has not noted any obvious dietary triggers, no prior history of similar presentation, no sick contacts. Alex Service has not had any diarrhea or constipation and has yet to have any upper GI symptoms.  Patient denies any changes in weight or blood in her stool. Patient celiac studies were strongly consistent with celiac disease. Her TTG IgA was strongly positive and greater than 128. Her GDA also strongly positive.   This alongside with her duodenal biopsies favoring moderate severity celiac disease with villous blunting and likely marsh stage of 3        PLAN:    1) celiac disease- modest improvement after patient being placed on gluten-free diet, patient is seen moderate mount improvement over the last 3 weeks since starting diet. Gluten-free diet strongly recommended at this time as patient is more conscious and aware of potential exposures and correlation with her abdominal symptoms. Her fat-soluble vitamin levels appear to be adequate with a slight insufficiency related to vitamin D. Her DEXA scan appears to be most consistent with osteoporosis, encourage the patient follow-up with her primary care physician to obtain supplementation. Will obtain a serological studies prior to next visit in approximately 3 months    2) osteoporosis- follow-up with primary care physician with regards to treatment options and supplementation    3) colonic wall thickening identified on CAT scan- endoscopically there were no mucosal changes that were identified in the hepatic and transverse segment. Segmental biopsies obtained which failed to reveal any mucosal abnormalities. No evidence of inflammatory bowel disease based on histopathological findings. Patient was identified to have a tubular adenoma which was removed. Another serrated adenoma was identified and removed. Repeat colonoscopy recommended in 3 years    Thank you for allowing me to participate in the care of Ms. Edwardo Jenkins. For any further questions please do not hesitate to contact me. I have reviewed and agree with the ROS entered by the MA/LPN from today's encounter documented in a separate note. Baltazar Morillo MD, MPH   [facility]  Office #: [phone]    this note is created with the assistance of a speech recognition program.  While intending to generate a document that actually reflects the content of the visit, the document can still have some errors including those of syntax and sound a like substitutions which may escape proof reading.   It such instances, actual meaning can be extrapolated by contextual diversion.

## 2020-07-28 NOTE — PROGRESS NOTES
Subjective:      Patient ID: Juliette Walker is a 52 y.o. female. HPI    Review of Systems   Constitutional: Negative. HENT: Negative. Eyes: Negative. Respiratory: Negative. Cardiovascular: Negative. Gastrointestinal: Positive for abdominal pain. Endocrine: Negative. Genitourinary: Negative. Musculoskeletal: Negative. Skin: Negative. Allergic/Immunologic: Positive for environmental allergies. Neurological: Negative. Hematological: Negative. Psychiatric/Behavioral: Negative. All other systems reviewed and are negative.       Objective:   Physical Exam    Assessment:            Plan:

## 2020-08-07 ENCOUNTER — PATIENT MESSAGE (OUTPATIENT)
Dept: FAMILY MEDICINE CLINIC | Age: 50
End: 2020-08-07

## 2020-08-07 ENCOUNTER — TELEMEDICINE (OUTPATIENT)
Dept: FAMILY MEDICINE CLINIC | Age: 50
End: 2020-08-07
Payer: COMMERCIAL

## 2020-08-07 PROCEDURE — 99442 PR PHYS/QHP TELEPHONE EVALUATION 11-20 MIN: CPT | Performed by: FAMILY MEDICINE

## 2020-08-07 RX ORDER — ERGOCALCIFEROL (VITAMIN D2) 1250 MCG
50000 CAPSULE ORAL WEEKLY
Qty: 4 CAPSULE | Refills: 2 | Status: SHIPPED | OUTPATIENT
Start: 2020-08-07 | End: 2020-08-27

## 2020-08-07 RX ORDER — CHOLECALCIFEROL (VITAMIN D3) 125 MCG
500 CAPSULE ORAL DAILY
Qty: 30 TABLET | Refills: 3 | Status: SHIPPED | OUTPATIENT
Start: 2020-08-07 | End: 2020-08-10 | Stop reason: SDUPTHER

## 2020-08-07 ASSESSMENT — PATIENT HEALTH QUESTIONNAIRE - PHQ9
2. FEELING DOWN, DEPRESSED OR HOPELESS: 0
SUM OF ALL RESPONSES TO PHQ9 QUESTIONS 1 & 2: 0
SUM OF ALL RESPONSES TO PHQ QUESTIONS 1-9: 0
1. LITTLE INTEREST OR PLEASURE IN DOING THINGS: 0
SUM OF ALL RESPONSES TO PHQ QUESTIONS 1-9: 0

## 2020-08-07 NOTE — PROGRESS NOTES
General FM note    Scott Leonard is a 52 y.o. female who presents today for follow up on her  medical conditions as noted below. Scott Leonard is c/o of No chief complaint on file. Patient Active Problem List:     ASC-H, +HPV 18 on 12/3/19     Crohn's disease ([facility] [address]     Colitis     Celiac disease     Past Medical History:   Diagnosis Date    Abdominal pain     Anxiety     Hypothyroidism     Wears glasses       Past Surgical History:   Procedure Laterality Date     SECTION  G2, 2007    COLONOSCOPY N/A 7/10/2020    COLONOSCOPY WITH BIOPSY performed by Aneta Dias MD at \[facility]\"" Endoscopy    AK LAP,CHOLECYSTECTOMY N/A 2018    XI ROBOTIC LAPAROSCOPIC CHOLECYSTECTOMY, performed by Gloria Valdez IV, DO at [facility] N/A 7/10/2020    EGD BIOPSY performed by Aneta Dias MD at \[facility]\"" Endoscopy     Family History   Problem Relation Age of Onset    Cancer Father     Other Sister         cerebral hemmorage    Other Maternal Aunt         cerebral hemmorage     Current Outpatient Medications   Medication Sig Dispense Refill    ergocalciferol (ERGOCALCIFEROL) 1.25 MG (23984 UT) capsule Take 1 capsule by mouth once a week 4 capsule 2    vitamin B-12 (CYANOCOBALAMIN) 500 MCG tablet Take 1 tablet by mouth daily 30 tablet 3    Calcium Citrate-Vitamin D (CALCIUM + VIT D, BARIATRIC ADVANTAGE, CHEWABLE TABLET) Take 1 tablet by mouth daily 30 tablet 3    SYNTHROID 125 MCG tablet        No current facility-administered medications for this visit. ALLERGIES:  No Known Allergies    Social History     Tobacco Use    Smoking status: Never Smoker    Smokeless tobacco: Never Used   Substance Use Topics    Alcohol use: Yes     Comment: wine a few times a week      There is no height or weight on file to calculate BMI. LMP 2016 (Approximate)     Subjective:      HPI    51-year-old female reaching out today per telemedicine visit.   Patient was not able to hear me for telemedicine visit to discuss and call was switched to phone encounter. She tells me that she was diagnosed with celiac disease by a gastroenterologist.  She tells me that she was found to have low vitamin levels but also was found to have osteoporosis. She would like to know what to do. She denies any fractures she denies any bone aches. If she is postmenopausal.    Review of Systems   Constitutional: Negative for fever and unexpected weight change. Respiratory: Negative for cough and shortness of breath. Cardiovascular: Negative for chest pain and leg swelling. Gastrointestinal: Negative for diarrhea, constipation and blood in stool. Musculoskeletal: Negative for back pain and gait problem. Skin: Negative for color change and rash. Neurological: Negative for dizziness and headaches. Psychiatric/Behavioral: Negative for confusion and agitation. Objective:   Physical Exam  General appearance: normal development, habitus and attention, no deformities. No distress. Pulmo: normal breathing pattern. Psychiatric: alert and oriented to place, time and person. Normal mood and affect. Assessment:       Diagnosis Orders   1. Age-related osteoporosis without current pathological fracture  ergocalciferol (ERGOCALCIFEROL) 1.25 MG (87033 UT) capsule    Calcium Citrate-Vitamin D (CALCIUM + VIT D, BARIATRIC ADVANTAGE, CHEWABLE TABLET)   2. Vitamin B 12 deficiency  vitamin B-12 (CYANOCOBALAMIN) 500 MCG tablet       Plan:   I discussed in details with the patient the current findings. Discussed with her osteoporosis porosis treatment and prevention. I will give her reading materials patient will reach out and let me know if she wants to start a bisphosphonate and which when she wants to start. Start vitamins. Will recheck vitamin D levels in 3 months. Call or return to clinic prn if these symptoms worsen or fail to improve as anticipated.   I have reviewed the instructions with the patient, answering all questions to her satisfaction. John Slater is a 52 y.o. female being evaluated by a Virtual Visit (video visit) encounter to address concerns as mentioned above. A caregiver was present when appropriate. Due to this being a TeleHealth encounter (During CSJLN-48 public health emergency), evaluation of the following organ systems was limited: Vitals/Constitutional/EENT/Resp/CV/GI//MS/Neuro/Skin/Heme-Lymph-Imm. Pursuant to the emergency declaration under the [address] [place] [address], [address] [place] [address] authority and the [employer] Act, this Virtual Visit was conducted with patient's (and/or legal guardian's) consent, to reduce the patient's risk of exposure to COVID-19 and provide necessary medical care. The patient (and/or legal guardian) has also been advised to contact this office for worsening conditions or problems, and seek emergency medical treatment and/or call 911 if deemed necessary. Patient identification was verified at the start of the visit: Yes    Total time spent for this encounter: 15 minutes    Services were provided through a video synchronous discussion virtually to substitute for in-person clinic visit. Patient and provider were located at their individual homes. --Brook Asher MD on 8/7/2020 at 7:41 AM    An electronic signature was used to authenticate this note. No follow-ups on file. No orders of the defined types were placed in this encounter.     Orders Placed This Encounter   Medications    ergocalciferol (ERGOCALCIFEROL) 1.25 MG (66155 UT) capsule     Sig: Take 1 capsule by mouth once a week     Dispense:  4 capsule     Refill:  2    vitamin B-12 (CYANOCOBALAMIN) 500 MCG tablet     Sig: Take 1 tablet by mouth daily     Dispense:  30 tablet     Refill:  3    Calcium Citrate-Vitamin D (CALCIUM + VIT D, BARIATRIC ADVANTAGE, CHEWABLE TABLET)     Sig: Take 1 tablet by mouth daily     Dispense:  30 tablet     Refill:  3       Allyn Morales received counseling on the following healthy behaviors: nutrition, exercise and medication adherence  Reviewed prior labs and health maintenance. Continue current medications, diet and exercise. Discussed use, benefit, and side effects of prescribed medications. Barriers to medication compliance addressed. Patient given educational materials - see patient instructions. All patient questions answered. Patient voiced understanding.       Electronically signed by More Arroyo MD on 8/7/2020 at 7:40 AM       (Please note that portions of this note were completed with a voice recognition program. Efforts were made to edit the dictations but occasionally words are mis-transcribed.)

## 2020-08-07 NOTE — TELEPHONE ENCOUNTER
From: Saul Hall  To: Crystal Castañeda MD  Sent: 8/7/2020 1:59 PM EDT  Subject: Visit Follow-Up Question    Hello,  thank you for speaking with me this morning. I have a couple questions. How much calcium do I take daily? I picked up my rx for b12 and vitamin d but they did receive the rx for calcium. And you said to recheck my vitamin levels in 3 months but my chart suggests 6. I have an appointment in 3 months with Dr. Renee Gray I think he is checking my blood work, maybe vitamins too? I have also decided at this point to not take the bisphosinate. There are some conflicting side effects right now. Celiac disease is preventing me from absorbing some nutrients and I am low in calcium . The info you sent me states don't take if I have either of these .      Thank you   Nick Gonzalez

## 2020-08-07 NOTE — PATIENT INSTRUCTIONS
milk, and dark green vegetables. This is a good way to get the calcium you need. You can get vitamin D from eggs, fatty fish, cereal, and milk. ? Ask your doctor if you need to take a calcium plus vitamin D supplement. You may be able to get enough calcium and vitamin D through your diet. Be careful with supplements. Adults ages 23 to 48 should not get more than 2,500 mg of calcium and 4,000 IU of vitamin D each day, whether it is from supplements and/or food. Adults ages 46 and older should not get more than 2,000 mg of calcium and 4,000 IU of vitamin D each day from supplements and/or food. · Limit alcohol to 2 drinks a day for men and 1 drink a day for women. Too much alcohol can cause health problems. · Do not smoke. Smoking puts you at a much higher risk for osteoporosis. If you need help quitting, talk to your doctor about stop-smoking programs and medicines. These can increase your chances of quitting for good. · Get regular bone-building exercise. Weight-bearing and resistance exercises keep bones healthy by working the muscles and bones against gravity. Start out at an exercise level that feels right for you. Add a little at a time until you can do the following:  ? Do 30 minutes of weight-bearing exercise on most days of the week. Walking, jogging, stair climbing, and dancing are good choices. ? Do resistance exercises with weights or elastic bands 2 to 3 days a week. · Reduce your risk of falls:  ? Wear supportive shoes with low heels and nonslip soles. ? Use a cane or walker, if you need it. Use shower chairs and bath benches. Put in handrails on stairways, around your shower or tub area, and near the toilet. ? Keep stairs, porches, and walkways well lit. Use night-lights. ? Remove throw rugs and other objects that are in the way. ? Avoid icy, wet, or slippery surfaces. ? Keep a cordless phone and a flashlight with new batteries by your bed. When should you call for help?   Watch closely for appointments, and call your doctor if you are having problems. It's also a good idea to know your test results and keep a list of the medicines you take. How can you care for yourself at home? · Get enough calcium and vitamin D. The [place] [employer] recommends adults younger than age 46 need 1,000 mg of calcium and 600 IU of vitamin D each day. Women ages 46 to 79 need 1,200 mg of calcium and 600 IU of vitamin D each day. Men ages 46 to 79 need 1,000 mg of calcium and 600 IU of vitamin D each day. Adults 71 and older need 1,200 mg of calcium and 800 IU of vitamin D each day. ? Eat foods rich in calcium, like yogurt, cheese, milk, and dark green vegetables. ? Eat foods rich in vitamin D, like eggs, fatty fish, cereal, and fortified milk. ? Get some sunshine. Your body uses sunshine to make its own vitamin D. The safest time to be out in the sun is before 10 a.m. or after 3 p.m. Avoid getting sunburned. Sunburn can increase your risk of skin cancer. ? Talk to your doctor about taking a calcium plus vitamin D supplement if you don't think you are getting enough through your diet and sunshine. Ask about what type of calcium is right for you, and how much to take at a time. Adults ages 23 to 48 should not get more than 2,500 mg of calcium and 4,000 IU of vitamin D each day, whether it is from supplements and/or food. Adults ages 46 and older should not get more than 2,000 mg of calcium and 4,000 IU of vitamin D each day from supplements and/or food. · Get regular bone-building exercise. Weight-bearing and resistance exercises keep bones healthy by working the muscles and bones against gravity. Start out at an exercise level that feels right for you. Add a little at a time until you can do the following:  ? Do 30 minutes of weight-bearing exercise on most days of the week. Walking, jogging, stair climbing, and dancing are good choices.   ? Do resistance exercises with weights or elastic bands 2 to 3 days a week.  · Limit alcohol. Drink no more than 1 alcohol drink a day if you are a woman. Drink no more than 2 alcohol drinks a day if you are a man. · Do not smoke. Smoking can make bones thin faster. If you need help quitting, talk to your doctor about stop-smoking programs and medicines. These can increase your chances of quitting for good. When should you call for help? Watch closely for changes in your health, and be sure to contact your doctor if you have any problems. Where can you learn more? Go to https://PS DEPT.[employer]. org and sign in to your [employer] account. Enter M354 in the [employer] box to learn more about \"Preventing Osteoporosis: Care Instructions. \"     If you do not have an account, please click on the \"Sign Up Now\" link. Current as of: August 7, 2019               Content Version: 12.5  © 3355-7014 [employer]. Care instructions adapted under license by [facility] ([facility]). If you have questions about a medical condition or this instruction, always ask your healthcare professional. [address] any warranty or liability for your use of this information. denosumab (Prolia)  Pronunciation:  zack roe mab  Brand:  Prolia  What is the most important information I should know about Prolia? This medication guide provides information about the Prolia brand of denosumab. Thressa Maroon is another brand of denosumab used to prevent bone fractures and other skeletal conditions in people with tumors that have spread to the bone. You should not receive denosumab if you have low levels of calcium in your blood (hypocalcemia). Prolia can harm an unborn baby or cause birth defects. Do not use if you are pregnant. What is denosumab (Prolia)? Denosumab is a monoclonal antibody. Monoclonal antibodies are made to target and destroy only certain cells in the body. This may help to protect healthy cells from damage.   The Prolia brand of denosumab is used to treat osteoporosis in postmenopausal women who have high risk of bone fracture. Prolia is also used to increase bone mass in women and men with a high risk of bone fracture caused by receiving treatments for certain types of cancer. This medication guide provides information about the Prolia brand of denosumab. Shawna Stephane is another brand of denosumab used to prevent bone fractures and other skeletal conditions in people with tumors that have spread to the bone. Denosumab may also be used for purposes not listed in this medication guide. What should I discuss with my health care provider before receiving Prolia? You should not receive Prolia if you are allergic to denosumab, or if you have low levels of calcium in your blood (hypocalcemia). While you are using Prolia, you should not receive Xgeva, another brand of denosumab. To make sure Prolia is safe for you, tell your doctor if you have:  · kidney disease (or if you are on dialysis);  · a weak immune system (caused by disease or by using certain medicines);  · a history of hypoparathyroidism (decreased functioning of the parathyroid glands);  · a history of thyroid surgery;  · a history of surgery to remove part of your intestine;  · any condition that makes it hard for your body to absorb nutrients from food (malabsorption); or  · if you are allergic to latex. Denosumab may cause bone loss (osteonecrosis) in the jaw. Symptoms include jaw pain or numbness, red or swollen gums, loose teeth, gum infection, or slow healing after dental work. Osteonecrosis of the jaw may be more likely if you have cancer or received chemotherapy, radiation, or steroids. Other risk factors include blood clotting disorders, anemia (low red blood cells), and a pre-existing dental problem. Denosumab can harm an unborn baby or cause birth defects. Do not use Prolia if you are pregnant. Tell your doctor right away if you become pregnant during treatment.   It is not known whether denosumab passes into breast milk or if it could harm a nursing baby. This medicine may also slow the production of breast milk. You should not breast-feed while using denosumab. How is Prolia given? Denosumab is injected under the skin of your stomach, upper thigh, or upper arm. A healthcare provider will give you this injection. Prolia is usually given once every 6 months. Your doctor may have you take extra calcium and vitamin D while you are being treated with denosumab. Take only the amount of calcium and vitamin D that your doctor has prescribed. If you need to have any dental work (especially surgery), tell the dentist ahead of time that you are receiving denosumab. Pay special attention to your dental hygiene. Brush and floss your teeth regularly while receiving this medication. You may need to have a dental exam before you begin treatment with Prolia. Follow your doctor's instructions. Your risk of bone fractures can increase when you stop using Prolia. Do not stop using this medicine without first talking to your doctor. If you keep this medicine at home, store it in the original container in a refrigerator. Protect from light and do not freeze. You may take the medicine out of the refrigerator and allow it to reach room temperature before the injection is given. Do not heat the medicine before using. Do not shake the prefilled syringe or you may ruin the medicine. Do not use the medicine if it looks cloudy or has particles in it. Call your pharmacist for a new prescription. Each prefilled syringe of this medicine is for one use only. Throw away after one use, even if there is still some medicine left in it after injecting your dose. After you have taken Prolia out of the refrigerator, you may keep it at room temperature for up to 14 days. Store in the original container away from heat and light. Use a disposable needle and syringe only once.  Follow any state or local laws about throwing away herein is not intended to cover all possible uses, directions, precautions, warnings, drug interactions, allergic reactions, or adverse effects. If you have questions about the drugs you are taking, check with your doctor, nurse or pharmacist.  Copyright 3460-9364 [facility]. Version: 11.02. Revision date: 8/1/2017. Care instructions adapted under license by [facility] ([facility]). If you have questions about a medical condition or this instruction, always ask your healthcare professional. [place] [address] any warranty or liability for your use of this information. alendronate  Pronunciation:  a WILIAM prakash wojciech  Brand:  Binosto, Fosamax  What is the most important information I should know about alendronate? You should not take alendronate if you have problems with your esophagus, or low levels of calcium in your blood. Do not take alendronate if you cannot sit upright or stand for at least 30 minutes after taking the medicine. Alendronate can cause serious problems in the stomach or esophagus. Stop using alendronate and call your doctor at once if you have chest pain, new or worsening heartburn, or pain when swallowing. Also call your doctor if you have muscle spasms, numbness or tingling (in hands and feet or around the mouth), new or unusual hip pain, or severe pain in your joints, bones, or muscles. What is alendronate? Alendronate is used to treat osteoporosis caused by menopause, steroid use, or gonadal failure. This medicine is for use when you have a high risk of bone fracture due to osteoporosis. Alendronate is also used to treat Paget's disease of bone. Alendronate may also be used for purposes not listed in this medication guide. What should I discuss with my healthcare provider before taking alendronate?   You should not take alendronate if you are allergic to it, or if you have:  · low levels of calcium in your blood (hypocalcemia); or  · problems with the muscles in your esophagus (the tube that connects your mouth and stomach). Do not take alendronate if you cannot sit upright or stand for at least 30 minutes. Alendronate can cause serious problems in the stomach or esophagus. You must stay upright for at least 30 minutes after taking this medicine. Tell your doctor if you have ever had:  · trouble swallowing;  · problems with your stomach or digestion;  · hypocalcemia;  · a dental problem (you may need a dental exam before you begin taking alendronate);  · kidney disease; or  · any condition that makes it hard for your body to absorb nutrients from food (malabsorption). The effervescent tablet contains a lot of sodium. Tell your doctor if you are on a low-salt diet before using this form of alendronate. This medicine may cause jaw bone problems (osteonecrosis). The risk is highest in people with cancer, blood cell disorders, pre-existing dental problems, or people treated with steroids, chemotherapy, or radiation. Ask your doctor about your own risk. It is not known whether this medicine will harm an unborn baby. Tell your doctor if you are pregnant or trying to become pregnant. Stop using the medicine and tell your doctor right away if you become pregnant. It may not be safe to breastfeed while using this medicine. Ask your doctor about any risk. How should I take alendronate? Follow all directions on your prescription label and read all medication guides or instruction sheets. Use the medicine exactly as directed. Alendronate is taken either once daily or once per week. Follow your doctor's dosing instructions very carefully. Take alendronate first thing in the morning, at least 30 minutes before you eat or drink anything or take any other medicine. If you take alendronate only once per week, take it on the same day each week and always first thing in the morning. Take with a full glass (6 to 8 ounces) of plain water.  Do not use coffee, tea, soda, juice, or mineral water. Do not eat or drink anything other than plain water. Measure liquid medicine carefully. Use the dosing syringe provided, or use a medicine dose-measuring device (not a kitchen spoon). Do not crush, chew, or suck on an alendronate regular tablet. Swallow it whole. Dissolve the effervescent tablet in at least 4 ounces of water (at room temperature, not hot or cold). Let the tablet dissolve for 5 minutes. Stir this mixture for 10 seconds and drink all of it right away. Add a little more water to the glass, swirl gently and drink right away. For at least 30 minutes after taking alendronate:   · Do not lie down or recline. · Do not take any other medicine including vitamins, calcium, or antacids. Pay special attention to your dental hygiene while taking alendronate. Brush and floss your teeth regularly. If you need to have any dental work (especially surgery), tell the dentist ahead of time that you are using alendronate. Alendronate is only part of a complete program of treatment that may also include diet changes, exercise, bone mineral density testing, and taking calcium and vitamin supplements. Follow your doctor's instructions very closely. Store at room temperature away from moisture and heat. Keep unused effervescent tablets in the foil blister pack. Your doctor will determine how long to treat you with this medicine. Alendronate is often given for only 3 to 5 years. What happens if I miss a dose? Once-daily dosing: If you forget to take alendronate first thing in the morning, do not take it later in the day. Wait until the following morning and skip the missed dose. Do not take two (2) doses in one day. Once-per-week dosing: If you forget to take alendronate on your scheduled day, take it first thing in the morning on the day after you remember the missed dose. Then return to your regular weekly schedule on your chosen dose day. Do not take 2 doses in one day.   What happens if I overdose? Drink a full glass of milk and seek emergency medical attention or call the Poison Help line at 1-535.116.8222. Do not make yourself vomit and do not lie down. What should I avoid while taking alendronate? Avoid taking any other medicines for at least 30 minutes after taking alendronate. This includes vitamins, calcium, and antacids. Some medicines can make it harder for your body to absorb alendronate. Avoid smoking, or try to quit. Smoking can reduce your bone mineral density, making fractures more likely. Avoid drinking large amounts of alcohol. Heavy drinking can also cause bone loss. What are the possible side effects of alendronate? Get emergency medical help if you have signs of an allergic reaction: hives; wheezing, difficulty breathing; swelling of your face, lips, tongue, or throat. Stop using alendronate and call your doctor at once if you have:  · chest pain, new or worsening heartburn;  · difficulty or pain when swallowing;  · pain or burning under the ribs or in the back;  · severe heartburn, burning pain in your upper stomach, or coughing up blood;  · new or unusual pain in your thigh or hip;  · jaw pain, numbness, or swelling;  · severe joint, bone, or muscle pain; or  · low calcium levels --muscle spasms or contractions, numbness or tingly feeling (around your mouth, or in your fingers and toes). Common side effects may include:  · heartburn, upset stomach;  · stomach pain, nausea;  · diarrhea, constipation; or  · bone pain, muscle or joint pain. This is not a complete list of side effects and others may occur. Call your doctor for medical advice about side effects. You may report side effects to FDA at 1-334-FDA-0036. What other drugs will affect alendronate?   Tell your doctor about all your other medicines, especially:  · aspirin; or  · NSAIDs (nonsteroidal anti-inflammatory drugs) --ibuprofen (Advil, Motrin), naproxen (Aleve), celecoxib, diclofenac, indomethacin, meloxicam, pharmacist.  Copyright 8904-6510 Cathleen Bhardwaj. Version: 14.01. Revision date: 8/22/2019. Care instructions adapted under license by [facility] ([facility]). If you have questions about a medical condition or this instruction, always ask your healthcare professional. [address] any warranty or liability for your use of this information.

## 2020-08-10 ENCOUNTER — PATIENT MESSAGE (OUTPATIENT)
Dept: FAMILY MEDICINE CLINIC | Age: 50
End: 2020-08-10

## 2020-08-10 RX ORDER — CHOLECALCIFEROL (VITAMIN D3) 25 MCG
1 TABLET,CHEWABLE ORAL DAILY
Qty: 30 CAPSULE | Refills: 3 | Status: SHIPPED | OUTPATIENT
Start: 2020-08-10 | End: 2021-02-12

## 2020-08-10 NOTE — TELEPHONE ENCOUNTER
From: Naa Ovalles  To: Juana Araujo MD  Sent: 8/10/2020 7:50 AM EDT  Subject: Visit Follow-Up Question    No I have b12. My question was about calcium. How much to take daily? That was a script listed in your notes but not called in.       ----- Message -----   Antoni Card MD   Sent:8/10/2020 7:20 AM EDT   To:Halley Daniels   Subject:RE: Visit Follow-Up Question    Good morning. I did call in the vitamin B12 supplement again to the [facility] pharmacy. Thank you for keeping me up-to-date. And please start weightbearing exercises. Thank you.      ----- Message -----   From:Halley Whelan   Sent:8/7/2020 1:59 PM EDT   Marya Tamez MD   Subject:Visit Follow-Up Question    Hello,  thank you for speaking with me this morning. I have a couple questions. How much calcium do I take daily? I picked up my rx for b12 and vitamin d but they did receive the rx for calcium. And you said to recheck my vitamin levels in 3 months but my chart suggests 6. I have an appointment in 3 months with Dr. Cat Santos I think he is checking my blood work, maybe vitamins too? I have also decided at this point to not take the bisphosinate. There are some conflicting side effects right now. Celiac disease is preventing me from absorbing some nutrients and I am low in calcium . The info you sent me states don't take if I have either of these .      Thank you   Abram Luo

## 2020-08-11 ENCOUNTER — PATIENT MESSAGE (OUTPATIENT)
Dept: FAMILY MEDICINE CLINIC | Age: 50
End: 2020-08-11

## 2020-08-11 NOTE — TELEPHONE ENCOUNTER
From: Nestor Molina  To: Génesis Olivares MD  Sent: 8/11/2020 1:31 PM EDT  Subject: Visit Follow-Up Question    Okay so I just want to be sure I am taking the right dose of vitamins. You rx 500 mcg of vitamin b12 daily, vitamin D2 1.25mg taken once a week, you now want me to also take calcium 1200 mcg a day and also vitamin d3 400mcg . What is the difference between d2 and d3 ? Why both ?      Thank you   Joi Cevallos

## 2020-08-12 ENCOUNTER — PATIENT MESSAGE (OUTPATIENT)
Dept: FAMILY MEDICINE CLINIC | Age: 50
End: 2020-08-12

## 2020-08-12 NOTE — TELEPHONE ENCOUNTER
From: Mina Jones  To: Shekhar Diaz MD  Sent: 8/12/2020 9:45 AM EDT  Subject: Visit Follow-Up Question    I am taking d2 as the weekly dose. D3 daily supplement . That was my question if that was correct. What does the d2 do compared to d3?      ----- Message -----   Paulina Joseph MD   Sent:8/12/2020 6:19 AM EDT   To:Halley Daniels   Subject:RE: Visit Follow-Up Question    The Vit D3 is a weekly high dose to Be taken for 3 months. Will check the Vit D3 level again. Vit D2 is to be taken daily as a supplement. Please let me know if this makes sense to you. Thank you.      ----- Message -----   From:Halley Daniels   Sent:8/11/2020 1:31 PM EDT   Kamini Goodson MD   Subject:Visit Follow-Up Question    Okay so I just want to be sure I am taking the right dose of vitamins. You rx 500 mcg of vitamin b12 daily, vitamin D2 1.25mg taken once a week, you now want me to also take calcium 1200 mcg a day and also vitamin d3 400mcg . What is the difference between d2 and d3 ? Why both ?      Thank you   Elizabeth Delgadillo

## 2020-08-14 ENCOUNTER — PATIENT MESSAGE (OUTPATIENT)
Dept: FAMILY MEDICINE CLINIC | Age: 50
End: 2020-08-14

## 2020-08-17 NOTE — TELEPHONE ENCOUNTER
From: Sherly De Leon  To: Smith Manuel MD  Sent: 8/14/2020 2:20 PM EDT  Subject: Test Results Question    I was just informed my co-worker was diagnosed with COVID. What do I do? I need to quarantine myself and whole family ? Do I need an order to get tested ?      Thank you   Semaj Ly

## 2020-08-27 RX ORDER — ERGOCALCIFEROL 1.25 MG/1
CAPSULE ORAL
Qty: 4 CAPSULE | Refills: 2 | Status: SHIPPED | OUTPATIENT
Start: 2020-08-27 | End: 2020-11-13

## 2020-08-27 NOTE — TELEPHONE ENCOUNTER
Naa Ovalles is calling to request a refill on the following medication(s):    Last Visit Date (If Applicable):  4/4/5835    Next Visit Date:    Visit date not found    Medication Request:  Requested Prescriptions     Pending Prescriptions Disp Refills    vitamin D (ERGOCALCIFEROL) 1.25 MG (85088 UT) CAPS capsule [Pharmacy Med Name: VITAMIN D2 1.25MG(50,000 UNIT)] 4 capsule 2     Sig: TAKE 1 CAPSULE BY MOUTH ONE TIME PER WEEK

## 2020-10-21 ENCOUNTER — PATIENT MESSAGE (OUTPATIENT)
Dept: FAMILY MEDICINE CLINIC | Age: 50
End: 2020-10-21

## 2020-10-21 NOTE — TELEPHONE ENCOUNTER
From: Rose Montalvo  To: Bonilla Curran MD  Sent: 10/21/2020 3:36 PM EDT  Subject: Visit Follow-Up Question    Hello. I had an evisit with you in July. You stated you wanted to re-eval my vitamin levels in 3 months. I am going for blood work for Dr. Kar Alfaro checking my celiac disease later this week.  Can you place an order for my vitamins as well or do you need to see me first?    Thank you   Curt Nicole

## 2020-10-22 PROBLEM — D12.6 SERRATED ADENOMA OF COLON: Status: ACTIVE | Noted: 2020-10-22

## 2020-10-22 PROBLEM — D12.6 TUBULAR ADENOMA OF COLON: Status: ACTIVE | Noted: 2020-10-22

## 2020-10-23 LAB
VITAMIN D 25-HYDROXY: NORMAL
VITAMIN D2, 25 HYDROXY: NORMAL
VITAMIN D3,25 HYDROXY: NORMAL

## 2020-10-27 NOTE — RESULT ENCOUNTER NOTE
Vitamin D with normal limits now. Please continue vitamin D over-the-counter supplements daily vitamin. Thank you. Thank you.

## 2020-11-03 ENCOUNTER — OFFICE VISIT (OUTPATIENT)
Dept: GASTROENTEROLOGY | Age: 50
End: 2020-11-03
Payer: COMMERCIAL

## 2020-11-03 VITALS
BODY MASS INDEX: 23.56 KG/M2 | WEIGHT: 133 LBS | TEMPERATURE: 97.3 F | SYSTOLIC BLOOD PRESSURE: 118 MMHG | DIASTOLIC BLOOD PRESSURE: 83 MMHG

## 2020-11-03 PROBLEM — K50.90 CROHN'S DISEASE (HCC): Status: RESOLVED | Noted: 2020-06-23 | Resolved: 2020-11-03

## 2020-11-03 PROCEDURE — 99212 OFFICE O/P EST SF 10 MIN: CPT | Performed by: INTERNAL MEDICINE

## 2020-11-03 ASSESSMENT — ENCOUNTER SYMPTOMS
RESPIRATORY NEGATIVE: 1
GASTROINTESTINAL NEGATIVE: 1
EYES NEGATIVE: 1

## 2020-11-03 NOTE — PROGRESS NOTES
GI FOLLOW UP    INTERVAL HISTORY:       Celiac disease  Gluten-free diet  Patient has seen clinical improvement and now has formed stools. Patient underwent a TTG IgA antibody testing at [facility] which revealed residual elevated levels at 32 which is markedly improved since her initial testing which is greater than 128    Chief Complaint   Patient presents with    Follow-up     3 month follow up labs. Patient states she has noticed some pain in her right side in the past week, but besides that she has been doing very well. Denies bloating. Denies abdominal pain. States having normal bowel movements. HISTORY OF PRESENT ILLNESS: Ms.Julie SULLY Daniels is a 52 y. o. female with a past history remarkable for a remote history of Graves' disease status post radioactive iodine with medication induced hypothyroidism currently on Synthroid 125 mcg daily, had a subacute symptoms of right-sided flank and right abdominal pain that persisted and prompted a CT abdomen by the primary care physician, results revealed right sided ascending colonic wall thickening and an isolated 1 cm gretchen-mesenteric lymph node, referred for evaluation of of these findings along with her right side abdominal pain. Clinically improved with gluten-free diet with well formed stools currently    Past Medical,Family, and Social History reviewed and does contribute to the patient presenting condition. Patient's PMH/PSH,SH,PSYCH Hx, MEDs, ALLERGIES, and ROS were all reviewed and updated in the appropriate sections.     PAST MEDICAL HISTORY:  Past Medical History:   Diagnosis Date    Abdominal pain     Anxiety     Hypothyroidism     Wears glasses        Past Surgical History:   Procedure Laterality Date     SECTION  G2,     COLONOSCOPY N/A 7/10/2020    COLONOSCOPY WITH BIOPSY performed by Yajaira Ocampo MD at [facility]    NM LAP,CHOLECYSTECTOMY N/A 4/24/2018    XI ROBOTIC LAPAROSCOPIC CHOLECYSTECTOMY, performed by Elenita Valdez IV, DO at [facility] N/A 7/10/2020    EGD BIOPSY performed by Singh Cisneros MD at [facility]       CURRENT MEDICATIONS:    Current Outpatient Medications:     vitamin D (ERGOCALCIFEROL) 1.25 MG (54823 UT) CAPS capsule, TAKE 1 CAPSULE BY MOUTH ONE TIME PER WEEK, Disp: 4 capsule, Rfl: 2    Cyanocobalamin (B-12) 1000 MCG CAPS, Take 1 each by mouth daily, Disp: 30 capsule, Rfl: 3    Calcium Citrate-Vitamin D (CALCIUM + VIT D, BARIATRIC ADVANTAGE, CHEWABLE TABLET), Take 1 tablet by mouth daily, Disp: 30 tablet, Rfl: 3    SYNTHROID 125 MCG tablet, , Disp: , Rfl:     ALLERGIES:   No Known Allergies    FAMILY HISTORY:       Problem Relation Age of Onset    Cancer Father     Other Sister         cerebral hemmorage    Other Maternal Aunt         cerebral hemmorage         SOCIAL HISTORY:   Social History     Socioeconomic History    Marital status:      Spouse name: Not on file    Number of children: Not on file    Years of education: Not on file    Highest education level: Not on file   Occupational History    Not on file   Social Needs    Financial resource strain: Not on file    Food insecurity     Worry: Not on file     Inability: Not on file    Transportation needs     Medical: Not on file     Non-medical: Not on file   Tobacco Use    Smoking status: Never Smoker    Smokeless tobacco: Never Used   Substance and Sexual Activity    Alcohol use: Yes     Comment: wine a few times a week    Drug use: No    Sexual activity: Yes     Partners: Male   Lifestyle    Physical activity     Days per week: Not on file     Minutes per session: Not on file    Stress: Not on file   Relationships    Social connections     Talks on phone: Not on file     Gets together: Not on file     Attends Orthodoxy service: Not on file     Active member of club or organization: Not on file     Attends meetings of clubs or organizations: Not on file     Relationship status: Not on file    Intimate partner violence     Fear of current or ex partner: Not on file     Emotionally abused: Not on file     Physically abused: Not on file     Forced sexual activity: Not on file   Other Topics Concern    Not on file   Social History Narrative    Not on file       REVIEW OF SYSTEMS: A 12-point review of systems was obtained and pertinent positives and negatives were listed below. REVIEW OF SYSTEMS:     Constitutional: No fever, no chills, no lethargy, no weakness. HEENT:  No headache, otalgia, itchy eyes, nasal discharge or sore throat. Cardiac:  No chest pain, dyspnea, orthopnea or PND. Chest:   No cough, phlegm or wheezing. Abdomen:      Detailed by MA   Neuro:  No focal weakness, abnormal movements or seizure like activity. Skin:   No rashes, no itching. :   No hematuria, no pyuria, no dysuria, no flank pain. Extremities:  No swelling or joint pains. ROS was otherwise negative    Review of Systems   Constitutional: Negative. HENT: Negative. Eyes: Negative. Respiratory: Negative. Cardiovascular: Negative. Gastrointestinal: Negative. Endocrine: Negative. Genitourinary: Negative. Musculoskeletal: Negative. Skin: Negative. Allergic/Immunologic: Positive for environmental allergies. Neurological: Negative. Hematological: Negative. Psychiatric/Behavioral: Negative. All other systems reviewed and are negative. PHYSICAL EXAMINATION: Vital signs reviewed per the nursing documentation. /83   Temp 97.3 °F (36.3 °C)   Wt 133 lb (60.3 kg)   LMP 04/22/2016 (Approximate)   BMI 23.56 kg/m²   Body mass index is 23.56 kg/m². Physical Exam    Physical Exam   Constitutional: Patient is oriented to person, place, and time. Patient appears well-developed and well-nourished. HENT:   Head: Normocephalic and atraumatic. Eyes: Pupils are equal, round, and reactive to light. EOM are normal.   Neck: Normal range of motion. Neck supple. No JVD present. No tracheal deviation present. No thyromegaly present. Cardiovascular: Normal rate, regular rhythm, normal heart sounds and intact distal pulses. Pulmonary/Chest: Effort normal and breath sounds normal. No stridor. No respiratory distress. He has no wheezes. He has no rales. He exhibits no tenderness. Abdominal: Soft. Bowel sounds are normal. He exhibits no distension and no mass. There is no tenderness. There is no rebound and no guarding. No hernia. Musculoskeletal: Normal range of motion. Lymphadenopathy:    Patient has no cervical adenopathy. Neurological: Patient is alert and oriented to person, place, and time. Psychiatric: Patient has a normal mood and affect. Patient behavior is normal.       LABORATORY DATA: Reviewed  Lab Results   Component Value Date    WBC 7.6 06/13/2020    HGB 14.6 06/13/2020    HCT 43.1 06/13/2020    MCV 93.1 06/13/2020     06/13/2020     (L) 06/13/2020    K 4.3 06/13/2020    CL 93 (L) 06/13/2020    CO2 24 06/13/2020    BUN 15 06/13/2020    CREATININE 0.73 06/13/2020    LABALBU 4.3 06/13/2020    BILITOT 0.76 06/13/2020    ALKPHOS 78 06/13/2020    AST 35 (H) 06/13/2020    ALT 28 06/13/2020         Lab Results   Component Value Date    RBC 4.63 06/13/2020    HGB 14.6 06/13/2020    MCV 93.1 06/13/2020    MCH 31.5 06/13/2020    MCHC 33.9 06/13/2020    RDW 11.6 (L) 06/13/2020    MPV 9.2 06/13/2020    BASOPCT 1 06/13/2020    LYMPHSABS 2.00 06/13/2020    MONOSABS 0.72 06/13/2020    NEUTROABS 4.42 06/13/2020    EOSABS 0.43 06/13/2020    BASOSABS 0.05 06/13/2020         DIAGNOSTIC TESTING:     No results found. IMPRESSION: Ms.Julie SULLY Daniels is a 52 y. o. female with a past history remarkable for a remote history of Graves' disease status post radioactive iodine with medication induced hypothyroidism currently on Synthroid 125 mcg [phone]    this note is created with the assistance of a speech recognition program.  While intending to generate a document that actually reflects the content of the visit, the document can still have some errors including those of syntax and sound a like substitutions which may escape proof reading. It such instances, actual meaning can be extrapolated by contextual diversion.

## 2020-11-13 RX ORDER — ERGOCALCIFEROL 1.25 MG/1
CAPSULE ORAL
Qty: 12 CAPSULE | Refills: 0 | Status: SHIPPED | OUTPATIENT
Start: 2020-11-13 | End: 2021-02-01

## 2020-11-13 NOTE — TELEPHONE ENCOUNTER
Cricket Aschoff is calling to request a refill on the following medication(s):    Last Visit Date (If Applicable):  8/7/4460    Next Visit Date:    Visit date not found    Medication Request:  Requested Prescriptions     Pending Prescriptions Disp Refills    vitamin D (ERGOCALCIFEROL) 1.25 MG (80758 UT) CAPS capsule [Pharmacy Med Name: VITAMIN D2 1.25MG(50,000 UNIT)] 12 capsule 0     Sig: TAKE 1 CAPSULE BY MOUTH ONE TIME PER WEEK

## 2021-01-31 DIAGNOSIS — M81.0 AGE-RELATED OSTEOPOROSIS WITHOUT CURRENT PATHOLOGICAL FRACTURE: ICD-10-CM

## 2021-02-01 RX ORDER — ERGOCALCIFEROL 1.25 MG/1
CAPSULE ORAL
Qty: 12 CAPSULE | Refills: 0 | Status: SHIPPED | OUTPATIENT
Start: 2021-02-01 | End: 2021-02-26

## 2021-02-02 ENCOUNTER — HOSPITAL ENCOUNTER (OUTPATIENT)
Age: 51
Discharge: HOME | End: 2021-02-02
Payer: COMMERCIAL

## 2021-02-02 DIAGNOSIS — K90.0 CELIAC DISEASE: ICD-10-CM

## 2021-02-02 LAB
GLIADIN DEAMINIDATED PEPTIDE AB IGA: 6.1 U/ML
GLIADIN DEAMINIDATED PEPTIDE AB IGG: 8.5 U/ML
IGA: 118 MG/DL (ref 70–400)
TISSUE TRANSGLUTAMINASE IGA: 28 U/ML

## 2021-02-02 PROCEDURE — 36415 COLL VENOUS BLD VENIPUNCTURE: CPT

## 2021-02-02 PROCEDURE — 82784 ASSAY IGA/IGD/IGG/IGM EACH: CPT

## 2021-02-02 PROCEDURE — 83516 IMMUNOASSAY NONANTIBODY: CPT

## 2021-02-12 ENCOUNTER — TELEPHONE (OUTPATIENT)
Dept: GASTROENTEROLOGY | Age: 51
End: 2021-02-12

## 2021-02-12 ENCOUNTER — VIRTUAL VISIT (OUTPATIENT)
Dept: GASTROENTEROLOGY | Age: 51
End: 2021-02-12
Payer: COMMERCIAL

## 2021-02-12 DIAGNOSIS — K90.0 CELIAC DISEASE: Primary | ICD-10-CM

## 2021-02-12 PROCEDURE — 99212 OFFICE O/P EST SF 10 MIN: CPT | Performed by: INTERNAL MEDICINE

## 2021-02-12 RX ORDER — DOCUSATE SODIUM 100 MG/1
100 CAPSULE, LIQUID FILLED ORAL 2 TIMES DAILY
Qty: 60 CAPSULE | Refills: 0 | Status: SHIPPED | OUTPATIENT
Start: 2021-02-12 | End: 2021-03-14

## 2021-02-12 ASSESSMENT — ENCOUNTER SYMPTOMS
ABDOMINAL PAIN: 1
ALLERGIC/IMMUNOLOGIC NEGATIVE: 1
NAUSEA: 0
EYES NEGATIVE: 1
RECTAL PAIN: 0
CONSTIPATION: 1
ANAL BLEEDING: 0
ABDOMINAL DISTENTION: 0
VOMITING: 0
DIARRHEA: 0
RESPIRATORY NEGATIVE: 1
BLOOD IN STOOL: 0

## 2021-02-12 NOTE — PROGRESS NOTES
VIRTUAL VISIT:  Norm Cortes is a 54 y.o. female evaluated via telephone on 2/12/2021. Consent:  She and/or health care decision maker is aware that that she may receive a bill for this telephone service, depending on her insurance coverage, and has provided verbal consent to proceed: Yes    Agree with ROS as documented and detailed by MA/LPN in separate note from today's encounter     Documentation:  I communicated with the patient and/or health care decision maker about . Details of this discussion including any medical advice provided:     Ms.Julie SULLY Daniels is a 52 y. o. female with a past history remarkable for a remote history of Graves' disease status post radioactive iodine with medication induced hypothyroidism currently on Synthroid 125 mcg daily, had a subacute symptoms of right-sided flank and right abdominal pain that persisted and prompted a CT abdomen by the primary care physician, results revealed right sided ascending colonic wall thickening and an isolated 1 cm gretchen-mesenteric lymph node, referred for evaluation of of these findings along with her right side abdominal pain.     Patient states that abdominal pain was not associated with any bowel movements or p.o. intake.  Patient has not has not noted any obvious dietary triggers, no prior history of similar presentation, no sick contacts. Forrest Zaldivar has not had any diarrhea or constipation and has yet to have any upper GI symptoms.  Patient denies any changes in weight or blood in her stool.     Patient celiac studies were strongly consistent with celiac disease.  Her TTG IgA was strongly positive and greater than 128.  Her GDA also strongly positive.  This alongside with her duodenal biopsies favoring moderate severity celiac disease with villous blunting and likely marsh stage of 3      Recommendation:  Celiac diseaseceliac markers are declining but not normalized Constipation -Docusate, hold calcium/Vit D supplements  Repeat celiac panel in 3 months  Repeat BMP and vitamin D level  Follow-up virtual visit 2 weeks    I affirm this is a Patient Initiated Episode with an Established Patient who has not had a related appointment within my department in the past 7 days or scheduled within the next 24 hours. Total Time: minutes: 5-10 minutes    Heide West is a 54 y.o. female being evaluated by a Virtual Visit (telephone visit) encounter to address concerns as mentioned above. A caregiver was present when appropriate. Due to this being a TeleHealth encounter (During [facility] emergency), evaluation of the following organ systems was limited: Vitals/Constitutional/EENT/Resp/CV/GI//MS/Neuro/Skin/Heme-Lymph-Imm. Pursuant to the emergency declaration under the [facility], [address] [place] [address] authority and the [employer] Act, this Virtual Visit was conducted with patient's (and/or legal guardian's) consent, to reduce the patient's risk of exposure to COVID-19 and provide necessary medical care. The patient (and/or legal guardian) has also been advised to contact this office for worsening conditions or problems, and seek emergency medical treatment and/or call 911 if deemed necessary. Services were provided through a telephone synchronous discussion virtually to substitute for in-person clinic visit. Patient and provider were located at their individual homes. --Cindy Peterson MD on 2/12/2021 at 8:43 AM    An electronic signature was used to authenticate this note.        [address] [facility]

## 2021-02-12 NOTE — TELEPHONE ENCOUNTER
Patient called office stating that she has not received a call for her appointment. Advised that her nurse will call her around the time of her appointment and send a link to her at that time to proceed. Writer thanked and call ended.

## 2021-02-12 NOTE — PROGRESS NOTES
Review of Systems   Constitutional: Positive for appetite change (full fast) and fatigue. HENT: Negative. Eyes: Negative. Respiratory: Negative. Cardiovascular: Positive for palpitations. Gastrointestinal: Positive for abdominal pain and constipation. Negative for abdominal distention, anal bleeding, blood in stool, diarrhea, nausea, rectal pain and vomiting. Endocrine: Negative. Genitourinary: Negative. Musculoskeletal: Negative. Skin: Negative. Allergic/Immunologic: Negative. Neurological: Positive for headaches. Hematological: Negative. Psychiatric/Behavioral: Positive for sleep disturbance.

## 2021-02-19 ENCOUNTER — HOSPITAL ENCOUNTER (OUTPATIENT)
Age: 51
Discharge: HOME | End: 2021-02-19
Payer: COMMERCIAL

## 2021-02-19 DIAGNOSIS — K90.0 CELIAC DISEASE: ICD-10-CM

## 2021-02-19 LAB
ANION GAP SERPL CALCULATED.3IONS-SCNC: 6 MMOL/L (ref 9–17)
BUN BLDV-MCNC: 11 MG/DL (ref 6–20)
BUN/CREAT BLD: ABNORMAL (ref 9–20)
CALCIUM SERPL-MCNC: 9.3 MG/DL (ref 8.6–10.4)
CHLORIDE BLD-SCNC: 98 MMOL/L (ref 98–107)
CO2: 27 MMOL/L (ref 20–31)
CREAT SERPL-MCNC: 0.7 MG/DL (ref 0.5–0.9)
GFR AFRICAN AMERICAN: >60 ML/MIN
GFR NON-AFRICAN AMERICAN: >60 ML/MIN
GFR SERPL CREATININE-BSD FRML MDRD: ABNORMAL ML/MIN/{1.73_M2}
GFR SERPL CREATININE-BSD FRML MDRD: ABNORMAL ML/MIN/{1.73_M2}
GLUCOSE BLD-MCNC: 88 MG/DL (ref 70–99)
POTASSIUM SERPL-SCNC: 4.6 MMOL/L (ref 3.7–5.3)
SODIUM BLD-SCNC: 131 MMOL/L (ref 135–144)
VITAMIN D 25-HYDROXY: 44.8 NG/ML (ref 30–100)

## 2021-02-19 PROCEDURE — 82306 VITAMIN D 25 HYDROXY: CPT

## 2021-02-19 PROCEDURE — 80048 BASIC METABOLIC PNL TOTAL CA: CPT

## 2021-02-19 PROCEDURE — 36415 COLL VENOUS BLD VENIPUNCTURE: CPT

## 2021-02-26 ENCOUNTER — TELEMEDICINE (OUTPATIENT)
Dept: GASTROENTEROLOGY | Age: 51
End: 2021-02-26
Payer: COMMERCIAL

## 2021-02-26 ENCOUNTER — TELEPHONE (OUTPATIENT)
Dept: GASTROENTEROLOGY | Age: 51
End: 2021-02-26

## 2021-02-26 DIAGNOSIS — K90.0 CELIAC DISEASE: Primary | ICD-10-CM

## 2021-02-26 PROCEDURE — 99212 OFFICE O/P EST SF 10 MIN: CPT | Performed by: INTERNAL MEDICINE

## 2021-02-26 ASSESSMENT — ENCOUNTER SYMPTOMS
GASTROINTESTINAL NEGATIVE: 1
RESPIRATORY NEGATIVE: 1
ALLERGIC/IMMUNOLOGIC NEGATIVE: 1
BACK PAIN: 1
EYES NEGATIVE: 1

## 2021-02-26 NOTE — PROGRESS NOTES
Review of Systems   Constitutional: Negative. HENT: Negative. Eyes: Negative. Respiratory: Negative. Cardiovascular: Negative. Gastrointestinal: Negative. Endocrine: Negative. Genitourinary: Negative. Musculoskeletal: Positive for back pain, neck pain and neck stiffness. Allergic/Immunologic: Negative. Neurological: Negative. Hematological: Negative. Psychiatric/Behavioral: Negative.
problems, and seek emergency medical treatment and/or call 911 if deemed necessary. Services were provided through a telephone synchronous discussion virtually to substitute for in-person clinic visit. Patient and provider were located at their individual homes. --Madhavi Malik MD on 2/26/2021 at 11:49 AM    An electronic signature was used to authenticate this note.        [address] [facility]

## 2021-03-02 ENCOUNTER — OFFICE VISIT (OUTPATIENT)
Dept: OBGYN CLINIC | Age: 51
End: 2021-03-02
Payer: COMMERCIAL

## 2021-03-02 VITALS
HEART RATE: 68 BPM | WEIGHT: 129.38 LBS | BODY MASS INDEX: 22.93 KG/M2 | SYSTOLIC BLOOD PRESSURE: 116 MMHG | DIASTOLIC BLOOD PRESSURE: 78 MMHG | HEIGHT: 63 IN | TEMPERATURE: 96.9 F

## 2021-03-02 DIAGNOSIS — Z12.31 BREAST CANCER SCREENING BY MAMMOGRAM: ICD-10-CM

## 2021-03-02 DIAGNOSIS — Z01.419 WELL WOMAN EXAM WITH ROUTINE GYNECOLOGICAL EXAM: Primary | ICD-10-CM

## 2021-03-02 PROCEDURE — 99396 PREV VISIT EST AGE 40-64: CPT | Performed by: OBSTETRICS & GYNECOLOGY

## 2021-03-02 ASSESSMENT — ENCOUNTER SYMPTOMS
BACK PAIN: 0
COUGH: 0
ABDOMINAL PAIN: 0
SHORTNESS OF BREATH: 0

## 2021-03-09 ENCOUNTER — HOSPITAL ENCOUNTER (OUTPATIENT)
Dept: MAMMOGRAPHY | Facility: CLINIC | Age: 51
Discharge: HOME | End: 2021-03-11
Payer: COMMERCIAL

## 2021-03-09 DIAGNOSIS — Z12.31 BREAST CANCER SCREENING BY MAMMOGRAM: ICD-10-CM

## 2021-03-09 PROCEDURE — 77067 SCR MAMMO BI INCL CAD: CPT

## 2021-03-15 DIAGNOSIS — Z01.419 WELL WOMAN EXAM WITH ROUTINE GYNECOLOGICAL EXAM: ICD-10-CM

## 2021-04-13 ENCOUNTER — OFFICE VISIT (OUTPATIENT)
Dept: GASTROENTEROLOGY | Age: 51
End: 2021-04-13
Payer: COMMERCIAL

## 2021-04-13 VITALS — BODY MASS INDEX: 22.32 KG/M2 | DIASTOLIC BLOOD PRESSURE: 78 MMHG | WEIGHT: 126 LBS | SYSTOLIC BLOOD PRESSURE: 117 MMHG

## 2021-04-13 DIAGNOSIS — K52.9 COLITIS: ICD-10-CM

## 2021-04-13 DIAGNOSIS — K90.0 CELIAC DISEASE: Primary | ICD-10-CM

## 2021-04-13 PROCEDURE — 99213 OFFICE O/P EST LOW 20 MIN: CPT | Performed by: INTERNAL MEDICINE

## 2021-04-13 ASSESSMENT — ENCOUNTER SYMPTOMS
CONSTIPATION: 1
ABDOMINAL PAIN: 1
ALLERGIC/IMMUNOLOGIC NEGATIVE: 1
RESPIRATORY NEGATIVE: 1
BACK PAIN: 1
EYES NEGATIVE: 1

## 2021-04-13 NOTE — PROGRESS NOTES
GI FOLLOW UP    INTERVAL HISTORY:       No referring provider defined for this encounter. Chief Complaint   Patient presents with    Follow-up     6 week follow up RLQ pain    Abdominal Pain     Patient states its been 2 weeks since having pain. When she does have it it feels like a burning/pulling pain.  Constipation     Patient states ocassionally being constipated. She states feeling like things are \"slower\". She will have a BM every 2 days. She lets the constipation resolve on its own. Denies bleeding. 1. Celiac disease          HISTORY OF PRESENT ILLNESS:    Ms. Christianson Herminia a 52 y. o. female with a past history remarkable for a remote history of Graves' disease status post radioactive iodine with medication induced hypothyroidism currently on Synthroid 125 mcg daily, had a subacute symptoms of right-sided flank and right abdominal pain that persisted and prompted a CT abdomen by the primary care physician, results revealed right sided ascending colonic wall thickening and an isolated 1 cm gretchen-mesenteric lymph node, referred for evaluation of of these findings along with her right side abdominal pain.     Patient states that abdominal pain was not associated with any bowel movements or p.o. intake.  Patient has not has not noted any obvious dietary triggers, no prior history of similar presentation, no sick contacts. Qian Hedrick has not had any diarrhea or constipation and has yet to have any upper GI symptoms.  Patient denies any changes in weight or blood in her stool.     Patient celiac studies were strongly consistent with celiac disease.  Her TTG IgA was strongly positive and greater than 128.  Her GDA also strongly positive.  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 Patient's PMH/PSH,SH,PSYCH Hx, MEDs, ALLERGIES, and ROS were all reviewed and updated in the appropriate sections.     PAST MEDICAL HISTORY:  Past Medical History:   Diagnosis Date    Abdominal pain     Abnormal Pap smear of cervix     Anxiety     Hypothyroidism     Wears glasses        Past Surgical History:   Procedure Laterality Date     SECTION  G2, 2007    COLONOSCOPY N/A 7/10/2020    COLONOSCOPY WITH BIOPSY performed by Aneta Shin MD at [facility]    [place] LAP,CHOLECYSTECTOMY N/A 2018    XI ROBOTIC LAPAROSCOPIC CHOLECYSTECTOMY, [facility] Milton Valdez IV, DO at [facility] N/A 7/10/2020    EGD BIOPSY performed by Aneta Shin MD at [facility]       CURRENT MEDICATIONS:    Current Outpatient Medications:     SYNTHROID 125 MCG tablet, , Disp: , Rfl:     ALLERGIES:   No Known Allergies    FAMILY HISTORY:       Problem Relation Age of Onset    Cancer Father     Other Sister         cerebral hemmorage    Other Maternal Aunt         cerebral hemmorage         SOCIAL HISTORY:   Social History     Socioeconomic History    Marital status:      Spouse name: Not on file    Number of children: Not on file    Years of education: Not on file    Highest education level: Not on file   Occupational History    Not on file   Social Needs    Financial resource strain: Not on file    Food insecurity     Worry: Not on file     Inability: Not on file    Transportation needs     Medical: Not on file     Non-medical: Not on file   Tobacco Use    Smoking status: Never Smoker    Smokeless tobacco: Never Used   Substance and Sexual Activity    Alcohol use: Yes     Comment: wine a few times a week    Drug use: No    Sexual activity: Yes     Partners: Male   Lifestyle    Physical activity     Days per week: Not on file     Minutes per session: Not on file    Stress: Not on file   Relationships    Social connections     Talks on phone: Not on file     Gets together: Not on file     Attends Religion service: Not on file     Active member of club or organization: Not on file     Attends meetings of clubs or organizations: Not on file     Relationship status: Not on file    Intimate partner violence     Fear of current or ex partner: Not on file     Emotionally abused: Not on file     Physically abused: Not on file     Forced sexual activity: Not on file   Other Topics Concern    Not on file   Social History Narrative    Not on file       REVIEW OF SYSTEMS: A 12-point review of systems was obtained and pertinent positives and negatives were listed below. REVIEW OF SYSTEMS:     Constitutional: No fever, no chills, no lethargy, no weakness. HEENT:  No headache, otalgia, itchy eyes, nasal discharge or sore throat. Cardiac:  No chest pain, dyspnea, orthopnea or PND. Chest:   No cough, phlegm or wheezing. Abdomen:      Detailed by MA   Neuro:  No focal weakness, abnormal movements or seizure like activity. Skin:   No rashes, no itching. :   No hematuria, no pyuria, no dysuria, no flank pain. Extremities:  No swelling or joint pains. ROS was otherwise negative    Review of Systems   Constitutional: Negative. HENT: Negative. Eyes: Negative. Respiratory: Negative. Cardiovascular: Negative. Gastrointestinal: Positive for abdominal pain and constipation. Endocrine: Negative. Genitourinary: Negative. Musculoskeletal: Positive for back pain. Skin: Negative. Allergic/Immunologic: Negative. Neurological: Negative. Hematological: Negative. Psychiatric/Behavioral: Negative. All other systems reviewed and are negative. PHYSICAL EXAMINATION: Vital signs reviewed per the nursing documentation. /78   Wt 126 lb (57.2 kg)   LMP 04/22/2016 (Approximate)   BMI 22.32 kg/m²   Body mass index is 22.32 kg/m². Physical Exam    Physical Exam   Constitutional: Patient is oriented to person, place, and time. Patient appears well-developed and well-nourished. HENT:   Head: Normocephalic and atraumatic. Eyes: Pupils are equal, round, and reactive to light. EOM are normal.   Neck: Normal range of motion. Neck supple. No JVD present. No tracheal deviation present. No thyromegaly present. Cardiovascular: Normal rate, regular rhythm, normal heart sounds and intact distal pulses. Pulmonary/Chest: Effort normal and breath sounds normal. No stridor. No respiratory distress. He has no wheezes. He has no rales. He exhibits no tenderness. Abdominal: Soft. Bowel sounds are normal. He exhibits no distension and no mass. There is no tenderness. There is no rebound and no guarding. No hernia. Musculoskeletal: Normal range of motion. Lymphadenopathy:    Patient has no cervical adenopathy. Neurological: Patient is alert and oriented to person, place, and time. Psychiatric: Patient has a normal mood and affect. Patient behavior is normal.       LABORATORY DATA: Reviewed  Lab Results   Component Value Date    WBC 7.6 06/13/2020    HGB 14.6 06/13/2020    HCT 43.1 06/13/2020    MCV 93.1 06/13/2020     06/13/2020     (L) 02/19/2021    K 4.6 02/19/2021    CL 98 02/19/2021    CO2 27 02/19/2021    BUN 11 02/19/2021    CREATININE 0.70 02/19/2021    LABALBU 4.3 06/13/2020    BILITOT 0.76 06/13/2020    ALKPHOS 78 06/13/2020    AST 35 (H) 06/13/2020    ALT 28 06/13/2020         Lab Results   Component Value Date    RBC 4.63 06/13/2020    HGB 14.6 06/13/2020    MCV 93.1 06/13/2020    MCH 31.5 06/13/2020    MCHC 33.9 06/13/2020    RDW 11.6 (L) 06/13/2020    MPV 9.2 06/13/2020    BASOPCT 1 06/13/2020    LYMPHSABS 2.00 06/13/2020    MONOSABS 0.72 06/13/2020    NEUTROABS 4.42 06/13/2020    EOSABS 0.43 06/13/2020    BASOSABS 0.05 06/13/2020         DIAGNOSTIC TESTING:     No results found. Assessment  1.  Celiac disease IMPRESSION: Ms.Julie SULLY Daniels is a 52 y. o. female with a past history remarkable for a remote history of Graves' disease status post radioactive iodine with medication induced hypothyroidism currently on Synthroid 125 mcg daily, had a subacute symptoms of right-sided flank and right abdominal pain that persisted and prompted a CT abdomen by the primary care physician, results revealed right sided ascending colonic wall thickening and an isolated 1 cm gretchen-mesenteric lymph node, referred for evaluation of of these findings along with her right side abdominal pain.     Patient states that abdominal pain was not associated with any bowel movements or p.o. intake.  Patient has not has not noted any obvious dietary triggers, no prior history of similar presentation, no sick contacts. Saran Cruz has not had any diarrhea or constipation and has yet to have any upper GI symptoms.  Patient denies any changes in weight or blood in her stool.     Patient celiac studies were strongly consistent with celiac disease.  Her TTG IgA was strongly positive and greater than 128.  Her GDA also strongly positive.  This alongside with her duodenal biopsies favoring moderate severity celiac disease with villous blunting and likely marsh stage of 3    Currently patient reports some residual right-sided abdominal pain. Unclear whether the patient still has unrecovered and celiac disease with minor activity    CT scan initially obtained revealed some colonic wall thickening with some reactive lymphadenopathy identified on colonoscopy. PLAN:    1) remote history of colitis-we will need to find out whether or not there is still residual evidence of colitis on CT imaging. We will repeat this test.  Although patient has made significant clinical improvement with gluten-free diet, is unclear whether or not there are other confounding factors. 2) celiac disease-patient is maintaining gluten-free diet.   Recommend patient obtain a celiac disease panel to ensure that the patient is maintaining no exposure. 3) follow-up after CT imaging. Thank you for allowing me to participate in the care of Ms. Sona Ferrer. For any further questions please do not hesitate to contact me. I have reviewed and agree with the ROS entered by the MA/LPN from today's encounter documented in a separate note. Peng Kay MD, MPH   [facility]  Office #: [phone]    this note is created with the assistance of a speech recognition program.  While intending to generate a document that actually reflects the content of the visit, the document can still have some errors including those of syntax and sound a like substitutions which may escape proof reading. It such instances, actual meaning can be extrapolated by contextual diversion.

## 2021-04-14 ENCOUNTER — HOSPITAL ENCOUNTER (OUTPATIENT)
Age: 51
Discharge: HOME | End: 2021-04-14
Payer: COMMERCIAL

## 2021-04-14 DIAGNOSIS — K52.9 COLITIS: ICD-10-CM

## 2021-04-14 DIAGNOSIS — K90.0 CELIAC DISEASE: ICD-10-CM

## 2021-04-14 LAB — TSH SERPL DL<=0.05 MIU/L-ACNC: 1.48 MIU/L (ref 0.3–5)

## 2021-04-14 PROCEDURE — 36415 COLL VENOUS BLD VENIPUNCTURE: CPT

## 2021-04-14 PROCEDURE — 82784 ASSAY IGA/IGD/IGG/IGM EACH: CPT

## 2021-04-14 PROCEDURE — 84443 ASSAY THYROID STIM HORMONE: CPT

## 2021-04-14 PROCEDURE — 83516 IMMUNOASSAY NONANTIBODY: CPT

## 2021-04-15 LAB
GLIADIN DEAMINIDATED PEPTIDE AB IGA: 3.9 U/ML
GLIADIN DEAMINIDATED PEPTIDE AB IGG: 5.8 U/ML
IGA: 122 MG/DL (ref 70–400)
TISSUE TRANSGLUTAMINASE IGA: 17 U/ML

## 2021-04-15 NOTE — PROGRESS NOTES
[facility] PHYSICIANS  [facility] - [address] [facility]  Dept: [phone]  COLPOSCOPY PROCEDURE FORM    Chief Complaint:   Chief Complaint   Patient presents with    Procedure     colp         2021  Sammie Fernández  Patient's last menstrual period was 2016 (approximate).   54 y.o.      Past Medical History:   Diagnosis Date    Abdominal pain     Abnormal Pap smear of cervix     Anxiety     Hypothyroidism     Wears glasses          Past Surgical History:   Procedure Laterality Date     SECTION  G2,     COLONOSCOPY N/A 7/10/2020    COLONOSCOPY WITH BIOPSY performed by Dafne Murguia MD at [facility]    [place] LAP,CHOLECYSTECTOMY N/A 2018    XI ROBOTIC LAPAROSCOPIC CHOLECYSTECTOMY, performed by Jag Valdez IV, DO at [address] [facility] N/A 7/10/2020    EGD BIOPSY performed by Dafne Murguia MD at [facility]       Family History   Problem Relation Age of Onset    Cancer Father     Other Sister         cerebral hemmorage    Other Maternal Aunt         cerebral hemmorage       Social History     Socioeconomic History    Marital status:      Spouse name: Not on file    Number of children: Not on file    Years of education: Not on file    Highest education level: Not on file   Occupational History    Not on file   Social Needs    Financial resource strain: Not on file    Food insecurity     Worry: Not on file     Inability: Not on file   [employer] needs     Medical: Not on file     Non-medical: Not on file   Tobacco Use    Smoking status: Never Smoker    Smokeless tobacco: Never Used   Substance and Sexual Activity    Alcohol use: Yes     Comment: wine a few times a week    Drug use: No    Sexual activity: Yes     Partners: Male   Lifestyle    Physical activity     Days per week: Not on file     Minutes per session: Not on file    Stress: Not on file   Relationships  Social connections     Talks on phone: Not on file     Gets together: Not on file     Attends Judaism service: Not on file     Active member of club or organization: Not on file     Attends meetings of clubs or organizations: Not on file     Relationship status: Not on file    Intimate partner violence     Fear of current or ex partner: Not on file     Emotionally abused: Not on file     Physically abused: Not on file     Forced sexual activity: Not on file   Other Topics Concern    Not on file   Social History Narrative    Not on file       Current Outpatient Medications on File Prior to Visit   Medication Sig Dispense Refill    SYNTHROID 125 MCG tablet        No current facility-administered medications on file prior to visit. Allergies as of 04/16/2021    (No Known Allergies)           INDICATIONS:   1. Cervical high risk human papillomavirus (HPV) DNA test positive                   UHCG: postmenopausal         HPV:   positive      Birth Control Method: none  Abnormal Cytology and Colposcopy History: GENI 1 on previous colp    COLPOSCOPIC EXAMINATION:                Blood pressure 104/71, pulse 65, height 5' 3\" (1.6 m), weight 125 lb 9.6 oz (57 kg), last menstrual period 04/22/2016, not currently breastfeeding. Gross observations: negative  Satisfactory: Yes   Unsatisfactory: No    Physical Exam  Genitourinary:                     ECC performed:  Yes    Lugols Iodine Applied:   No       IMPRESSIONS: GENI 1 or normal  Biopsy sites: 4 o'clock      Assessment:   Diagnosis Orders   1. Cervical high risk human papillomavirus (HPV) DNA test positive           PLAN:   1. The patient was given formal restriction guidelines. She is instructed to report         any increase in vaginal bleeding, discharge, or any temperature more than      100.4   F. Strict pelvic rest precautions were reviewed with lifting restrictions. Will call pt with results of colposcopy.          Tahira Perez MD

## 2021-04-16 ENCOUNTER — PROCEDURE VISIT (OUTPATIENT)
Dept: OBGYN CLINIC | Age: 51
End: 2021-04-16
Payer: COMMERCIAL

## 2021-04-16 VITALS
HEART RATE: 65 BPM | WEIGHT: 125.6 LBS | HEIGHT: 63 IN | DIASTOLIC BLOOD PRESSURE: 71 MMHG | BODY MASS INDEX: 22.25 KG/M2 | SYSTOLIC BLOOD PRESSURE: 104 MMHG

## 2021-04-16 DIAGNOSIS — R87.810 CERVICAL HIGH RISK HUMAN PAPILLOMAVIRUS (HPV) DNA TEST POSITIVE: Primary | ICD-10-CM

## 2021-04-16 PROCEDURE — 57454 BX/CURETT OF CERVIX W/SCOPE: CPT | Performed by: OBSTETRICS & GYNECOLOGY

## 2021-04-20 ENCOUNTER — HOSPITAL ENCOUNTER (OUTPATIENT)
Dept: CT IMAGING | Facility: CLINIC | Age: 51
Discharge: HOME | End: 2021-04-22
Payer: COMMERCIAL

## 2021-04-20 DIAGNOSIS — K90.0 CELIAC DISEASE: ICD-10-CM

## 2021-04-20 DIAGNOSIS — K52.9 COLITIS: ICD-10-CM

## 2021-04-20 PROCEDURE — 2580000003 HC RX 258: Performed by: INTERNAL MEDICINE

## 2021-04-20 PROCEDURE — 6360000004 HC RX CONTRAST MEDICATION: Performed by: INTERNAL MEDICINE

## 2021-04-20 PROCEDURE — 74177 CT ABD & PELVIS W/CONTRAST: CPT

## 2021-04-20 RX ORDER — SODIUM CHLORIDE 0.9 % (FLUSH) 0.9 %
10 SYRINGE (ML) INJECTION PRN
Status: DISCONTINUED | OUTPATIENT
Start: 2021-04-20 | End: 2021-04-23 | Stop reason: HOSPADM

## 2021-04-20 RX ORDER — 0.9 % SODIUM CHLORIDE 0.9 %
70 INTRAVENOUS SOLUTION INTRAVENOUS ONCE
Status: COMPLETED | OUTPATIENT
Start: 2021-04-20 | End: 2021-04-20

## 2021-04-20 RX ADMIN — SODIUM CHLORIDE 70 ML: 9 INJECTION, SOLUTION INTRAVENOUS at 10:34

## 2021-04-20 RX ADMIN — IOPAMIDOL 75 ML: 755 INJECTION, SOLUTION INTRAVENOUS at 10:35

## 2021-04-20 RX ADMIN — Medication 10 ML: at 10:33

## 2021-04-23 DIAGNOSIS — M81.0 AGE-RELATED OSTEOPOROSIS WITHOUT CURRENT PATHOLOGICAL FRACTURE: ICD-10-CM

## 2021-04-23 RX ORDER — ERGOCALCIFEROL 1.25 MG/1
CAPSULE ORAL
Qty: 12 CAPSULE | Refills: 0 | Status: SHIPPED | OUTPATIENT
Start: 2021-04-23 | End: 2021-07-09

## 2021-04-26 ENCOUNTER — TELEPHONE (OUTPATIENT)
Dept: OBGYN CLINIC | Age: 51
End: 2021-04-26

## 2021-04-26 DIAGNOSIS — R87.810 CERVICAL HIGH RISK HUMAN PAPILLOMAVIRUS (HPV) DNA TEST POSITIVE: ICD-10-CM

## 2021-04-26 PROBLEM — N87.0 DYSPLASIA OF CERVIX, LOW GRADE (CIN 1): Status: ACTIVE | Noted: 2021-04-26

## 2021-04-26 NOTE — TELEPHONE ENCOUNTER
LVM with surg path results. ( GENI 1 like Dr. Alexander Kincaid has discussed at visit, recommendation of pap in 1 year) Advised pt to call back with any questions.

## 2021-05-11 ENCOUNTER — OFFICE VISIT (OUTPATIENT)
Dept: FAMILY MEDICINE CLINIC | Age: 51
End: 2021-05-11
Payer: COMMERCIAL

## 2021-05-11 VITALS
HEART RATE: 83 BPM | SYSTOLIC BLOOD PRESSURE: 112 MMHG | HEIGHT: 63 IN | DIASTOLIC BLOOD PRESSURE: 81 MMHG | OXYGEN SATURATION: 100 % | BODY MASS INDEX: 22.54 KG/M2 | WEIGHT: 127.2 LBS | TEMPERATURE: 97.9 F

## 2021-05-11 DIAGNOSIS — Z00.01 ENCOUNTER FOR WELL ADULT EXAM WITH ABNORMAL FINDINGS: Primary | ICD-10-CM

## 2021-05-11 DIAGNOSIS — K90.0 CELIAC DISEASE: ICD-10-CM

## 2021-05-11 DIAGNOSIS — F34.1 DYSTHYMIA: ICD-10-CM

## 2021-05-11 PROCEDURE — 99213 OFFICE O/P EST LOW 20 MIN: CPT | Performed by: FAMILY MEDICINE

## 2021-05-11 PROCEDURE — 99396 PREV VISIT EST AGE 40-64: CPT | Performed by: FAMILY MEDICINE

## 2021-05-11 SDOH — ECONOMIC STABILITY: INCOME INSECURITY: HOW HARD IS IT FOR YOU TO PAY FOR THE VERY BASICS LIKE FOOD, HOUSING, MEDICAL CARE, AND HEATING?: NOT HARD AT ALL

## 2021-05-11 SDOH — ECONOMIC STABILITY: TRANSPORTATION INSECURITY
IN THE PAST 12 MONTHS, HAS THE LACK OF TRANSPORTATION KEPT YOU FROM MEDICAL APPOINTMENTS OR FROM GETTING MEDICATIONS?: NOT ASKED

## 2021-05-11 ASSESSMENT — PATIENT HEALTH QUESTIONNAIRE - PHQ9
2. FEELING DOWN, DEPRESSED OR HOPELESS: 1
SUM OF ALL RESPONSES TO PHQ QUESTIONS 1-9: 1
SUM OF ALL RESPONSES TO PHQ9 QUESTIONS 1 & 2: 1

## 2021-05-11 NOTE — PROGRESS NOTES
present. Cardiovascular: Normal rate, regular rhythm, normal heart sounds. Pulmonary/Chest: Effort normal and breath sounds normal. No respiratory distress. She has no wheezes. She has no rales. Abdominal: Soft. Bowel sounds are normal.  She exhibits no distension and no mass. There is no tenderness. There is no rebound and no guarding. Musculoskeletal: Normal range of motion. She exhibits no edema or tenderness. Lymphadenopathy:    She has no cervical adenopathy. Neurological:  She is alert and oriented to person, place, and time. Cranial nerves grossly intact. No sensation problem noted. Muscle strength 5/5 throughout. Skin: Skin is warm and dry. No rash noted. No erythema. Psychiatric:  She has a normal mood and affect. Behavior is normal.    Jackie Good was seen today for annual exam.    Diagnoses and all orders for this visit:    Encounter for well adult exam with abnormal findings    Dysthymia  -     [address] [facility]    Celiac [address]    The patient is doing quite well. She will see behavioral health for counseling and also nutrition service to guide her what she is able to eat with the celiac disease. I will take over her thyroid medication. She will let me know with any changes. Call or return to clinic prn if these symptoms worsen or fail to improve as anticipated. I have reviewed the instructions with the patient, answering all questions to her satisfaction. Patient Counseling:  --Nutrition: Stressed importance of moderation in sodium/caffeine intake, saturated fat and cholesterol, caloric balance, sufficient intake of fresh fruits, vegetables, fiber, calcium, iron, and 1 mg of folate supplement per day (for females capable of pregnancy). --Exercise: Stressed the importance of regular exercise.    --Substance Abuse: Discussed cessation/primary prevention of tobacco, alcohol, or other drug use; driving or other dangerous activities under the influence; availability of treatment for abuse. --Sexuality: Discussed sexually transmitted diseases, partner selection, use of condoms, avoidance of unintended pregnancy  and contraceptive alternatives. --Injury prevention: Discussed safety belts, safety helmets, smoke detector, smoking near bedding or upholstery. --Dental health: Discussed importance of regular tooth brushing, flossing, and dental visits. --Immunizations reviewed. --After hours service discussed with patient    Marshall Daniel received counseling on the following healthy behaviors: nutrition, exercise and medication adherence  Reviewed prior labs and health maintenance. Continue current medications, diet and exercise. Discussed use, benefit, and side effects of prescribed medications. Barriers to medication compliance addressed. Patient given educational materials - see patient instructions. All patient questions answered. Patient voiced understanding.       Follow up in one year     (Please note that portions of this note were completed with a voice recognition program. Efforts were made to edit the dictations but occasionally words are mis-transcribed.)

## 2021-06-01 ENCOUNTER — OFFICE VISIT (OUTPATIENT)
Dept: GASTROENTEROLOGY | Age: 51
End: 2021-06-01
Payer: COMMERCIAL

## 2021-06-01 ENCOUNTER — PATIENT MESSAGE (OUTPATIENT)
Dept: FAMILY MEDICINE CLINIC | Age: 51
End: 2021-06-01

## 2021-06-01 VITALS — WEIGHT: 126 LBS | SYSTOLIC BLOOD PRESSURE: 113 MMHG | BODY MASS INDEX: 22.32 KG/M2 | DIASTOLIC BLOOD PRESSURE: 78 MMHG

## 2021-06-01 DIAGNOSIS — K90.0 CELIAC DISEASE: Primary | ICD-10-CM

## 2021-06-01 PROCEDURE — 99213 OFFICE O/P EST LOW 20 MIN: CPT | Performed by: INTERNAL MEDICINE

## 2021-06-01 ASSESSMENT — ENCOUNTER SYMPTOMS
RESPIRATORY NEGATIVE: 1
EYES NEGATIVE: 1
ABDOMINAL PAIN: 1
CONSTIPATION: 1
ALLERGIC/IMMUNOLOGIC NEGATIVE: 1
BACK PAIN: 1

## 2021-06-01 NOTE — PROGRESS NOTES
blunting and likely marsh stage of 3    Past Medical,Family, and Social History reviewed and does contribute to the patient presenting condition. Patient's PMH/PSH,SH,PSYCH Hx, MEDs, ALLERGIES, and ROS were all reviewed and updated in the appropriate sections.     PAST MEDICAL HISTORY:  Past Medical History:   Diagnosis Date    Abdominal pain     Abnormal Pap smear of cervix     Anxiety     Hypothyroidism     Wears glasses        Past Surgical History:   Procedure Laterality Date     SECTION  G2, 2007    COLONOSCOPY N/A 7/10/2020    COLONOSCOPY WITH BIOPSY performed by Mabel Perez MD at [facility]    [place] LAP,CHOLECYSTECTOMY N/A 2018    XI ROBOTIC LAPAROSCOPIC CHOLECYSTECTOMY, performed by Alba Valdez IV, DO at [facility] N/A 7/10/2020    EGD BIOPSY performed by Mabel Perez MD at [facility]       CURRENT MEDICATIONS:    Current Outpatient Medications:     SYNTHROID 125 MCG tablet, , Disp: , Rfl:     vitamin D (ERGOCALCIFEROL) 1.25 MG (75512 UT) CAPS capsule, TAKE 1 CAPSULE BY MOUTH ONE TIME PER WEEK (Patient not taking: Reported on 2021), Disp: 12 capsule, Rfl: 0    ALLERGIES:   No Known Allergies    FAMILY HISTORY:       Problem Relation Age of Onset    Cancer Father     Other Sister         cerebral hemmorage    Other Maternal Aunt         cerebral hemmorage         SOCIAL HISTORY:   Social History     Socioeconomic History    Marital status:      Spouse name: Not on file    Number of children: Not on file    Years of education: Not on file    Highest education level: Not on file   Occupational History    Not on file   Tobacco Use    Smoking status: Never Smoker    Smokeless tobacco: Never Used   Vaping Use    Vaping Use: Never used   Substance and Sexual Activity    Alcohol use: Yes     Comment: wine a few times a week    Drug use: No    Sexual activity: Yes     Partners: Male   Other Topics Concern    Not on Skin: Negative. Allergic/Immunologic: Negative. Neurological: Negative. Hematological: Negative. Psychiatric/Behavioral: Negative. All other systems reviewed and are negative. PHYSICAL EXAMINATION: Vital signs reviewed per the nursing documentation. /78   Wt 126 lb (57.2 kg)   LMP 04/22/2016 (Approximate)   BMI 22.32 kg/m²   Body mass index is 22.32 kg/m². Physical Exam    Physical Exam   Constitutional: Patient is oriented to person, place, and time. Patient appears well-developed and well-nourished. HENT:   Head: Normocephalic and atraumatic. Eyes: Pupils are equal, round, and reactive to light. EOM are normal.   Neck: Normal range of motion. Neck supple. No JVD present. No tracheal deviation present. No thyromegaly present. Cardiovascular: Normal rate, regular rhythm, normal heart sounds and intact distal pulses. Pulmonary/Chest: Effort normal and breath sounds normal. No stridor. No respiratory distress. He has no wheezes. He has no rales. He exhibits no tenderness. Abdominal: Soft. Bowel sounds are normal. He exhibits no distension and no mass. There is no tenderness. There is no rebound and no guarding. No hernia. Musculoskeletal: Normal range of motion. Lymphadenopathy:    Patient has no cervical adenopathy. Neurological: Patient is alert and oriented to person, place, and time. Psychiatric: Patient has a normal mood and affect.  Patient behavior is normal.       LABORATORY DATA: Reviewed  Lab Results   Component Value Date    WBC 7.6 06/13/2020    HGB 14.6 06/13/2020    HCT 43.1 06/13/2020    MCV 93.1 06/13/2020     06/13/2020     (L) 02/19/2021    K 4.6 02/19/2021    CL 98 02/19/2021    CO2 27 02/19/2021    BUN 11 02/19/2021    CREATININE 0.70 02/19/2021    LABALBU 4.3 06/13/2020    BILITOT 0.76 06/13/2020    ALKPHOS 78 06/13/2020    AST 35 (H) 06/13/2020    ALT 28 06/13/2020         Lab Results   Component Value Date    RBC 4.63 06/13/2020

## 2021-06-01 NOTE — TELEPHONE ENCOUNTER
From: Randy Dykes  To: Hever Padilla MD  Sent: 6/1/2021 2:01 PM EDT  Subject: Non-Urgent Medical Question    You gave me a referal to Dr. Orly Garcia , [facility]. She is not a listed provider for my insurance, [address] [place] [address] through [facility]. Do you have another name ?

## 2021-07-06 ENCOUNTER — HOSPITAL ENCOUNTER (OUTPATIENT)
Age: 51
Discharge: HOME | End: 2021-07-06
Payer: COMMERCIAL

## 2021-07-06 DIAGNOSIS — K90.0 CELIAC DISEASE: ICD-10-CM

## 2021-07-06 PROCEDURE — 83516 IMMUNOASSAY NONANTIBODY: CPT

## 2021-07-06 PROCEDURE — 36415 COLL VENOUS BLD VENIPUNCTURE: CPT

## 2021-07-06 PROCEDURE — 82784 ASSAY IGA/IGD/IGG/IGM EACH: CPT

## 2021-07-07 LAB
GLIADIN DEAMINIDATED PEPTIDE AB IGA: 4.1 U/ML
GLIADIN DEAMINIDATED PEPTIDE AB IGG: 4.5 U/ML
IGA: 123 MG/DL (ref 70–400)
TISSUE TRANSGLUTAMINASE IGA: 17 U/ML

## 2021-07-09 DIAGNOSIS — M81.0 AGE-RELATED OSTEOPOROSIS WITHOUT CURRENT PATHOLOGICAL FRACTURE: ICD-10-CM

## 2021-07-09 RX ORDER — ERGOCALCIFEROL 1.25 MG/1
CAPSULE ORAL
Qty: 12 CAPSULE | Refills: 0 | Status: SHIPPED | OUTPATIENT
Start: 2021-07-09 | End: 2022-05-31

## 2021-07-09 NOTE — TELEPHONE ENCOUNTER
Augustus Chadwick is calling to request a refill on the following medication(s):    Last Visit Date (If Applicable):  6/25/4360    Next Visit Date:    Visit date not found    Medication Request:  Requested Prescriptions     Pending Prescriptions Disp Refills    vitamin D (ERGOCALCIFEROL) 1.25 MG (37724 UT) CAPS capsule [Pharmacy Med Name: VITAMIN D2 1.25MG(50,000 UNIT)] 12 capsule 0     Sig: TAKE 1 CAPSULE BY MOUTH ONE TIME PER WEEK

## 2021-07-20 ENCOUNTER — OFFICE VISIT (OUTPATIENT)
Dept: GASTROENTEROLOGY | Age: 51
End: 2021-07-20
Payer: COMMERCIAL

## 2021-07-20 VITALS — SYSTOLIC BLOOD PRESSURE: 133 MMHG | DIASTOLIC BLOOD PRESSURE: 86 MMHG | WEIGHT: 127 LBS | BODY MASS INDEX: 22.5 KG/M2

## 2021-07-20 DIAGNOSIS — K90.0 CELIAC DISEASE: Primary | ICD-10-CM

## 2021-07-20 PROCEDURE — 99213 OFFICE O/P EST LOW 20 MIN: CPT | Performed by: INTERNAL MEDICINE

## 2021-07-20 ASSESSMENT — ENCOUNTER SYMPTOMS
BACK PAIN: 1
RESPIRATORY NEGATIVE: 1
GASTROINTESTINAL NEGATIVE: 1
EYES NEGATIVE: 1
ALLERGIC/IMMUNOLOGIC NEGATIVE: 1

## 2021-07-20 NOTE — PROGRESS NOTES
GI FOLLOW UP    INTERVAL HISTORY:     Unchanged TTG IgA and 17 units/mL over the last 3 months  Uncertain gluten exposure per patient  Modest improvement in the patient's GI symptoms    Chief Complaint   Patient presents with    Follow-up     celiac lab follow up     Abdominal Pain     Patient denies having RLQ abdominal pain.  Constipation     Patient states the constipation has gotten better and she has been drinking more water        1. Celiac disease          HISTORY OF PRESENT ILLNESS: Ms.Julie SULLY Daniels is a 52 y. o. female with a past history remarkable for a remote history of Graves' disease status post radioactive iodine with medication induced hypothyroidism currently on Synthroid 125 mcg daily, had a subacute symptoms of right-sided flank and right abdominal pain that persisted and prompted a CT abdomen by the primary care physician, results revealed right sided ascending colonic wall thickening and an isolated 1 cm gretchen-mesenteric lymph node, referred for evaluation of of these findings along with her right side abdominal pain.     Patient states that abdominal pain was not associated with any bowel movements or p.o. intake.  Patient has not has not noted any obvious dietary triggers, no prior history of similar presentation, no sick contacts. Sobia Marshall has not had any diarrhea or constipation and has yet to have any upper GI symptoms.  Patient denies any changes in weight or blood in her stool.     Patient celiac studies were strongly consistent with celiac disease.  Her TTG IgA was strongly positive and greater than 128.  Her GDA also strongly positive.  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 Patient's PMH/PSH,SH,PSYCH Hx, MEDs, ALLERGIES, and ROS were all reviewed and updated in the appropriate sections.     PAST MEDICAL HISTORY:  Past Medical History:   Diagnosis Date    Abdominal pain     Abnormal Pap smear of cervix     Anxiety     Hypothyroidism     Wears glasses        Past Surgical History:   Procedure Laterality Date     SECTION  G2, 2007    COLONOSCOPY N/A 7/10/2020    COLONOSCOPY WITH BIOPSY performed by Nicki Francis MD at [facility]    CT LAP,CHOLECYSTECTOMY N/A 2018    XI ROBOTIC LAPAROSCOPIC CHOLECYSTECTOMY, performed by Karely Valdez IV, DO at [address] [place] [address] N/A 7/10/2020    EGD BIOPSY performed by Nicki Francis MD at [facility]       CURRENT MEDICATIONS:    Current Outpatient Medications:     vitamin D (ERGOCALCIFEROL) 1.25 MG (74160 UT) CAPS capsule, TAKE 1 CAPSULE BY MOUTH ONE TIME PER WEEK, Disp: 12 capsule, Rfl: 0    SYNTHROID 125 MCG tablet, , Disp: , Rfl:     ALLERGIES:   No Known Allergies    FAMILY HISTORY:       Problem Relation Age of Onset    Cancer Father     Other Sister         cerebral hemmorage    Other Maternal Aunt         cerebral hemmorage         SOCIAL HISTORY:   Social History     Socioeconomic History    Marital status:      Spouse name: Not on file    Number of children: Not on file    Years of education: Not on file    Highest education level: Not on file   Occupational History    Not on file   Tobacco Use    Smoking status: Never Smoker    Smokeless tobacco: Never Used   Vaping Use    Vaping Use: Never used   Substance and Sexual Activity    Alcohol use: Yes     Comment: wine a few times a week    Drug use: No    Sexual activity: Yes     Partners: Male   Other Topics Concern    Not on file   Social History Narrative    Not on file     Social Determinants of Health     Financial Resource Strain: Low Risk     Difficulty of Paying Living Expenses: Not hard at all Food Insecurity: No Food Insecurity    Worried About Running Out of Food in the Last Year: Never true    Reva of Food in the Last Year: Never true   Transportation Needs:     Lack of Transportation (Medical):  Lack of Transportation (Non-Medical):    Physical Activity:     Days of Exercise per Week:     Minutes of Exercise per Session:    Stress:     Feeling of Stress :    Social Connections:     Frequency of Communication with Friends and Family:     Frequency of Social Gatherings with Friends and Family:     Attends Sabianism Services:     Active Member of Clubs or Organizations:     Attends Club or Organization Meetings:     Marital Status:    Intimate Partner Violence:     Fear of Current or Ex-Partner:     Emotionally Abused:     Physically Abused:     Sexually Abused:        REVIEW OF SYSTEMS: A 12-point review of systems was obtained and pertinent positives and negatives were listed below. REVIEW OF SYSTEMS:     Constitutional: No fever, no chills, no lethargy, no weakness. HEENT:  No headache, otalgia, itchy eyes, nasal discharge or sore throat. Cardiac:  No chest pain, dyspnea, orthopnea or PND. Chest:   No cough, phlegm or wheezing. Abdomen:      Detailed by MA   Neuro:  No focal weakness, abnormal movements or seizure like activity. Skin:   No rashes, no itching. :   No hematuria, no pyuria, no dysuria, no flank pain. Extremities:  No swelling or joint pains. ROS was otherwise negative    Review of Systems   Constitutional: Negative. HENT: Negative. Eyes: Negative. Respiratory: Negative. Cardiovascular: Negative. Gastrointestinal: Negative. Endocrine: Negative. Genitourinary: Negative. Musculoskeletal: Positive for back pain. Skin: Negative. Allergic/Immunologic: Negative. Neurological: Negative. Hematological: Negative. Psychiatric/Behavioral: Negative. All other systems reviewed and are negative.       PHYSICAL EXAMINATION: Vital signs reviewed per the nursing documentation. /86   Wt 127 lb (57.6 kg)   LMP 04/22/2016 (Approximate)   BMI 22.50 kg/m²   Body mass index is 22.5 kg/m². Physical Exam    Physical Exam   Constitutional: Patient is oriented to person, place, and time. Patient appears well-developed and well-nourished. HENT:   Head: Normocephalic and atraumatic. Eyes: Pupils are equal, round, and reactive to light. EOM are normal.   Neck: Normal range of motion. Neck supple. No JVD present. No tracheal deviation present. No thyromegaly present. Cardiovascular: Normal rate, regular rhythm, normal heart sounds and intact distal pulses. Pulmonary/Chest: Effort normal and breath sounds normal. No stridor. No respiratory distress. He has no wheezes. He has no rales. He exhibits no tenderness. Abdominal: Soft. Bowel sounds are normal. He exhibits no distension and no mass. There is no tenderness. There is no rebound and no guarding. No hernia. Musculoskeletal: Normal range of motion. Lymphadenopathy:    Patient has no cervical adenopathy. Neurological: Patient is alert and oriented to person, place, and time. Psychiatric: Patient has a normal mood and affect.  Patient behavior is normal.       LABORATORY DATA: Reviewed  Lab Results   Component Value Date    WBC 7.6 06/13/2020    HGB 14.6 06/13/2020    HCT 43.1 06/13/2020    MCV 93.1 06/13/2020     06/13/2020     (L) 02/19/2021    K 4.6 02/19/2021    CL 98 02/19/2021    CO2 27 02/19/2021    BUN 11 02/19/2021    CREATININE 0.70 02/19/2021    LABALBU 4.3 06/13/2020    BILITOT 0.76 06/13/2020    ALKPHOS 78 06/13/2020    AST 35 (H) 06/13/2020    ALT 28 06/13/2020         Lab Results   Component Value Date    RBC 4.63 06/13/2020    HGB 14.6 06/13/2020    MCV 93.1 06/13/2020    MCH 31.5 06/13/2020    MCHC 33.9 06/13/2020    RDW 11.6 (L) 06/13/2020    MPV 9.2 06/13/2020    BASOPCT 1 06/13/2020    LYMPHSABS 2.00 06/13/2020    MONOSABS 0.72 06/13/2020    NEUTROABS 4.42 06/13/2020    EOSABS 0.43 06/13/2020    BASOSABS 0.05 06/13/2020         DIAGNOSTIC TESTING:     No results found. IMPRESSION: Ms.Julie SULLY Daniels is a 52 y. o. female with a past history remarkable for a remote history of Graves' disease status post radioactive iodine with medication induced hypothyroidism currently on Synthroid 125 mcg daily, had a subacute symptoms of right-sided flank and right abdominal pain that persisted and prompted a CT abdomen by the primary care physician, results revealed right sided ascending colonic wall thickening and an isolated 1 cm gretchen-mesenteric lymph node, referred for evaluation of of these findings along with her right side abdominal pain.     Patient states that abdominal pain was not associated with any bowel movements or p.o. intake.  Patient has not has not noted any obvious dietary triggers, no prior history of similar presentation, no sick contacts. Christi Goltz has not had any diarrhea or constipation and has yet to have any upper GI symptoms.  Patient denies any changes in weight or blood in her stool.     Patient celiac studies were strongly consistent with celiac disease.  Her TTG IgA was strongly positive and greater than 128.  Her GDA also strongly positive.  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  Unclear whether the patient still has unrecovered and celiac disease with minor activity     CT scan initially obtained revealed some colonic wall thickening with some reactive lymphadenopathy identified on colon. Per radiology read this appears to be likely related to the patient's celiac disease      Assessment  1. Celiac disease        PLAN:    1) celiac disease-TTG IgA unchanged over the last 3 months, patient is maintaining GFD.   Uncertain gluten exposure possible cross-contamination. no medication. Advised to remain meticulous with regards to free diet. Will repeat celiac disease markers in approximately 2 months. 2) no extraintestinal manifestations reported by the patient. Normal bowel movements. Mild dyspepsia and bloating symptoms without any significant nausea or vomiting. 3) return to clinic in 8 weeks. Thank you for allowing me to participate in the care of Ms. Ivan Rich. For any further questions please do not hesitate to contact me. I have reviewed and agree with the ROS entered by the MA/LPN from today's encounter documented in a separate note. Rodney Polanco MD, MPH   [facility]  Office #: [phone]    this note is created with the assistance of a speech recognition program.  While intending to generate a document that actually reflects the content of the visit, the document can still have some errors including those of syntax and sound a like substitutions which may escape proof reading. It such instances, actual meaning can be extrapolated by contextual diversion.

## 2021-09-17 ENCOUNTER — HOSPITAL ENCOUNTER (OUTPATIENT)
Age: 51
Discharge: HOME | End: 2021-09-17
Payer: COMMERCIAL

## 2021-09-17 DIAGNOSIS — K90.0 CELIAC DISEASE: ICD-10-CM

## 2021-09-17 PROCEDURE — 82784 ASSAY IGA/IGD/IGG/IGM EACH: CPT

## 2021-09-17 PROCEDURE — 83516 IMMUNOASSAY NONANTIBODY: CPT

## 2021-09-17 PROCEDURE — 36415 COLL VENOUS BLD VENIPUNCTURE: CPT

## 2021-09-19 LAB
GLIADIN DEAMINIDATED PEPTIDE AB IGA: 2.3 U/ML
GLIADIN DEAMINIDATED PEPTIDE AB IGG: 2.9 U/ML
IGA: 118 MG/DL (ref 70–400)
TISSUE TRANSGLUTAMINASE IGA: 11 U/ML

## 2021-09-21 ENCOUNTER — OFFICE VISIT (OUTPATIENT)
Dept: GASTROENTEROLOGY | Age: 51
End: 2021-09-21
Payer: COMMERCIAL

## 2021-09-21 VITALS — SYSTOLIC BLOOD PRESSURE: 121 MMHG | BODY MASS INDEX: 22.5 KG/M2 | DIASTOLIC BLOOD PRESSURE: 83 MMHG | WEIGHT: 127 LBS

## 2021-09-21 DIAGNOSIS — K90.0 CELIAC DISEASE: Primary | ICD-10-CM

## 2021-09-21 PROCEDURE — 99213 OFFICE O/P EST LOW 20 MIN: CPT | Performed by: INTERNAL MEDICINE

## 2021-09-21 ASSESSMENT — ENCOUNTER SYMPTOMS
EYES NEGATIVE: 1
BACK PAIN: 1
ABDOMINAL PAIN: 1
RESPIRATORY NEGATIVE: 1

## 2021-09-21 NOTE — PROGRESS NOTES
GI FOLLOW UP    INTERVAL HISTORY:       No referring provider defined for this encounter. Chief Complaint   Patient presents with    Follow-up     celiac lab follow up    Celiac Disease     Patient states her bowel movements have been normal. Occasional Right sided abdominal pain- states it feels like her gallbladder she had prior to having it removed. 1. Celiac disease          HISTORY OF PRESENT ILLNESS: Ms.Julie SULLY Daniels is a 52 y. o. female with a past history remarkable for a remote history of Graves' disease status post radioactive iodine with medication induced hypothyroidism currently on Synthroid 125 mcg daily, had a subacute symptoms of right-sided flank and right abdominal pain that persisted and prompted a CT abdomen by the primary care physician, results revealed right sided ascending colonic wall thickening and an isolated 1 cm gretchen-mesenteric lymph node, referred for evaluation of of these findings along with her right side abdominal pain.     Patient states that abdominal pain was not associated with any bowel movements or p.o. intake.  Patient has not has not noted any obvious dietary triggers, no prior history of similar presentation, no sick contacts. Rosy Hutchinson has not had any diarrhea or constipation and has yet to have any upper GI symptoms.  Patient denies any changes in weight or blood in her stool.     Patient celiac studies were strongly consistent with celiac disease.  Her TTG IgA was strongly positive and greater than 128.  Her GDA also strongly positive.  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     Patient's PMH/PSH,SH,PSYCH Hx, MEDs, ALLERGIES, and ROS were all reviewed and updated in the appropriate sections.     PAST MEDICAL HISTORY:  Past Medical History:   Diagnosis Date    Abdominal pain     Abnormal Pap smear of cervix     Anxiety     Hypothyroidism     Wears glasses        Past Surgical History:   Procedure Laterality Date     SECTION  G2, 2007    COLONOSCOPY N/A 7/10/2020    COLONOSCOPY WITH BIOPSY performed by Marcos Tellez MD at [facility]    ID LAP,CHOLECYSTECTOMY N/A 2018    XI ROBOTIC LAPAROSCOPIC CHOLECYSTECTOMY, performed by Baldo Valdez IV, DO at [address] N/A 7/10/2020    EGD BIOPSY performed by Marcos Tellez MD at [facility]       CURRENT MEDICATIONS:    Current Outpatient Medications:     SYNTHROID 125 MCG tablet, , Disp: , Rfl:     vitamin D (ERGOCALCIFEROL) 1.25 MG (98025 UT) CAPS capsule, TAKE 1 CAPSULE BY MOUTH ONE TIME PER WEEK, Disp: 12 capsule, Rfl: 0    ALLERGIES:   No Known Allergies    FAMILY HISTORY:       Problem Relation Age of Onset    Cancer Father     Other Sister         cerebral hemmorage    Other Maternal Aunt         cerebral hemmorage         SOCIAL HISTORY:   Social History     Socioeconomic History    Marital status:      Spouse name: Not on file    Number of children: Not on file    Years of education: Not on file    Highest education level: Not on file   Occupational History    Not on file   Tobacco Use    Smoking status: Never Smoker    Smokeless tobacco: Never Used   Vaping Use    Vaping Use: Never used   Substance and Sexual Activity    Alcohol use: Yes     Comment: wine a few times a week    Drug use: No    Sexual activity: Yes     Partners: Male   Other Topics Concern    Not on file   Social History Narrative    Not on file     Social Determinants of Health     Financial Resource Strain: Low Risk     Difficulty of Paying Living Expenses: Not hard at all   Food Insecurity: No Food Insecurity    Worried About Running Out of Food in the Last Year: Never true   [address] in the Last Year: Never true   Transportation Needs:     Lack of Transportation (Medical):  Lack of Transportation (Non-Medical):    Physical Activity:     Days of Exercise per Week:     Minutes of Exercise per Session:    Stress:     Feeling of Stress :    Social Connections:     Frequency of Communication with Friends and Family:     Frequency of Social Gatherings with Friends and Family:     Attends Muslim Services:     Active Member of Clubs or Organizations:     Attends Club or Organization Meetings:     Marital Status:    Intimate Partner Violence:     Fear of Current or Ex-Partner:     Emotionally Abused:     Physically Abused:     Sexually Abused:        REVIEW OF SYSTEMS: A 12-point review of systems was obtained and pertinent positives and negatives were listed below. REVIEW OF SYSTEMS:     Constitutional: No fever, no chills, no lethargy, no weakness. HEENT:  No headache, otalgia, itchy eyes, nasal discharge or sore throat. Cardiac:  No chest pain, dyspnea, orthopnea or PND. Chest:   No cough, phlegm or wheezing. Abdomen:      Detailed by MA   Neuro:  No focal weakness, abnormal movements or seizure like activity. Skin:   No rashes, no itching. :   No hematuria, no pyuria, no dysuria, no flank pain. Extremities:  No swelling or joint pains. ROS was otherwise negative    Review of Systems   Constitutional: Negative. HENT: Negative. Eyes: Negative. Respiratory: Negative. Cardiovascular: Negative. Gastrointestinal: Positive for abdominal pain. Endocrine: Negative. Genitourinary: Negative. Musculoskeletal: Positive for back pain. Skin: Negative. Allergic/Immunologic: Positive for food allergies. Neurological: Negative. Hematological: Negative. Psychiatric/Behavioral: Negative. All other systems reviewed and are negative.       PHYSICAL EXAMINATION: Vital signs reviewed per the nursing EOSABS 0.43 06/13/2020    BASOSABS 0.05 06/13/2020         DIAGNOSTIC TESTING:     No results found. IMPRESSION:Ms. Juliette Adame a 52 y. o. female with a past history remarkable for a remote history of Graves' disease status post radioactive iodine with medication induced hypothyroidism currently on Synthroid 125 mcg daily, had a subacute symptoms of right-sided flank and right abdominal pain that persisted and prompted a CT abdomen by the primary care physician, results revealed right sided ascending colonic wall thickening and an isolated 1 cm gretchen-mesenteric lymph node, referred for evaluation of of these findings along with her right side abdominal pain.     Patient states that abdominal pain was not associated with any bowel movements or p.o. intake.  Patient has not has not noted any obvious dietary triggers, no prior history of similar presentation, no sick contacts. Letty Rodriguez has not had any diarrhea or constipation and has yet to have any upper GI symptoms.  Patient denies any changes in weight or blood in her stool.     Patient celiac studies were strongly consistent with celiac disease.  Her TTG IgA was strongly positive and greater than 128.  Her GDA also strongly positive.  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  Unclear whether the patient still has unrecovered and celiac disease with minor activity     CT scan initially obtained revealed some colonic wall thickening with some reactive lymphadenopathy identified on colon. Per radiology read this appears to be likely related to the patient's celiac disease      Assessment  1. Celiac disease        PLAN:    1) patient reported some mild recurrent RLQ Pain, migrating in nature appears to be persistent since Sunday.   Symptoms do not appear to be alleviated with bowel movements

## 2021-11-09 ENCOUNTER — PATIENT MESSAGE (OUTPATIENT)
Dept: FAMILY MEDICINE CLINIC | Age: 51
End: 2021-11-09

## 2021-11-10 NOTE — TELEPHONE ENCOUNTER
From: Carmencita Hackett  To: Dr. No Close  Sent: 11/9/2021 7:36 PM EST  Subject: Non-Urgent Medical Question    Dr. Bridget Muñoz,    I have noticed I am brushing easy particularly my right leg. I don't remember doing anything to cause this much bruising. Is this from crossing my legs ? I bumped my thigh at work and one bruise on the front but there are bruises all done the back as well. This has happened before a few weeks ago. Am I deficient in a vitamin ? Circulation issue ? Please let me know if I should be concerned.    Sophia Camacho

## 2021-12-10 ENCOUNTER — HOSPITAL ENCOUNTER (OUTPATIENT)
Age: 51
Discharge: HOME | End: 2021-12-10
Payer: COMMERCIAL

## 2021-12-10 DIAGNOSIS — K90.0 CELIAC DISEASE: ICD-10-CM

## 2021-12-10 PROCEDURE — 82784 ASSAY IGA/IGD/IGG/IGM EACH: CPT

## 2021-12-10 PROCEDURE — 36415 COLL VENOUS BLD VENIPUNCTURE: CPT

## 2021-12-10 PROCEDURE — 83516 IMMUNOASSAY NONANTIBODY: CPT

## 2021-12-11 LAB
GLIADIN DEAMINIDATED PEPTIDE AB IGA: 3.6 U/ML
GLIADIN DEAMINIDATED PEPTIDE AB IGG: 2.6 U/ML
IGA: 126 MG/DL (ref 70–400)
TISSUE TRANSGLUTAMINASE IGA: 9.8 U/ML

## 2021-12-21 ENCOUNTER — OFFICE VISIT (OUTPATIENT)
Dept: GASTROENTEROLOGY | Age: 51
End: 2021-12-21
Payer: COMMERCIAL

## 2021-12-21 VITALS — SYSTOLIC BLOOD PRESSURE: 114 MMHG | DIASTOLIC BLOOD PRESSURE: 76 MMHG | WEIGHT: 129 LBS | BODY MASS INDEX: 22.85 KG/M2

## 2021-12-21 DIAGNOSIS — K90.0 CELIAC DISEASE: Primary | ICD-10-CM

## 2021-12-21 PROCEDURE — 99213 OFFICE O/P EST LOW 20 MIN: CPT | Performed by: INTERNAL MEDICINE

## 2021-12-21 ASSESSMENT — ENCOUNTER SYMPTOMS
EYES NEGATIVE: 1
GASTROINTESTINAL NEGATIVE: 1
BACK PAIN: 1
RESPIRATORY NEGATIVE: 1

## 2021-12-21 NOTE — PROGRESS NOTES
GI FOLLOW UP    INTERVAL HISTORY:     Clinically doing well from the GI standpoint  TTG reducing  Abdominal pain resolved    Chief Complaint   Patient presents with    Follow-up     Celiac lab follow up    Celiac Disease     Patient states having normal bowel movements. Denies abdominal pain. Maintaining GFD. 1. Celiac disease          HISTORY OF PRESENT ILLNESS: Ms.Julie SULLY Daniels is a 52 y. o. female with a past history remarkable for a remote history of Graves' disease status post radioactive iodine with medication induced hypothyroidism currently on Synthroid 125 mcg daily, had a subacute symptoms of right-sided flank and right abdominal pain that persisted and prompted a CT abdomen by the primary care physician, results revealed right sided ascending colonic wall thickening and an isolated 1 cm gretchen-mesenteric lymph node, referred for evaluation of of these findings along with her right side abdominal pain.     Patient states that abdominal pain was not associated with any bowel movements or p.o. intake.  Patient has not has not noted any obvious dietary triggers, no prior history of similar presentation, no sick contacts. Obdulia Bush has not had any diarrhea or constipation and has yet to have any upper GI symptoms.  Patient denies any changes in weight or blood in her stool.     Patient celiac studies were strongly consistent with celiac disease.  Her TTG IgA was strongly positive and greater than 128.  Her GDA also strongly positive.  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     Patient's PMH/PSH,SH,PSYCH Hx, MEDs, ALLERGIES, and ROS were all reviewed and updated in the appropriate sections.     PAST MEDICAL HISTORY:  Past Medical History:   Diagnosis Date    Abdominal pain     Abnormal Pap smear of cervix     Anxiety     Hypothyroidism     Wears glasses        Past Surgical History:   Procedure Laterality Date     SECTION  G2, 2007    COLONOSCOPY N/A 7/10/2020    COLONOSCOPY WITH BIOPSY performed by Mariano Mares MD at [facility]    [place] LAP,CHOLECYSTECTOMY N/A 2018    XI ROBOTIC LAPAROSCOPIC CHOLECYSTECTOMY, performed by Tracy Valdez IV, DO at [address] [place] [address] N/A 7/10/2020    EGD BIOPSY performed by Mariano Mares MD at [facility]       CURRENT MEDICATIONS:    Current Outpatient Medications:     SYNTHROID 125 MCG tablet, , Disp: , Rfl:     vitamin D (ERGOCALCIFEROL) 1.25 MG (61496 UT) CAPS capsule, TAKE 1 CAPSULE BY MOUTH ONE TIME PER WEEK, Disp: 12 capsule, Rfl: 0    ALLERGIES:   No Known Allergies    FAMILY HISTORY:       Problem Relation Age of Onset    Cancer Father     Other Sister         cerebral hemmorage    Other Maternal Aunt         cerebral hemmorage         SOCIAL HISTORY:   Social History     Socioeconomic History    Marital status:      Spouse name: Not on file    Number of children: Not on file    Years of education: Not on file    Highest education level: Not on file   Occupational History    Not on file   Tobacco Use    Smoking status: Never Smoker    Smokeless tobacco: Never Used   Vaping Use    Vaping Use: Never used   Substance and Sexual Activity    Alcohol use: Yes     Comment: wine a few times a week    Drug use: No    Sexual activity: Yes     Partners: Male   Other Topics Concern    Not on file   Social History Narrative    Not on file     Social Determinants of Health     Financial Resource Strain: Low Risk     Difficulty of Paying Living Expenses: Not hard at all   Food Insecurity: No Food Insecurity    Worried About Running Out of Food in the Last Year: Never true   [employer] Positive for back pain. Skin: Negative. Allergic/Immunologic: Positive for food allergies. Neurological: Negative. Hematological: Negative. Psychiatric/Behavioral: Negative. All other systems reviewed and are negative. PHYSICAL EXAMINATION: Vital signs reviewed per the nursing documentation. /76   Wt 129 lb (58.5 kg)   LMP 04/22/2016 (Approximate)   BMI 22.85 kg/m²   Body mass index is 22.85 kg/m². Physical Exam    Physical Exam   Constitutional: Patient is oriented to person, place, and time. Patient appears well-developed and well-nourished. HENT:   Head: Normocephalic and atraumatic. Eyes: Pupils are equal, round, and reactive to light. EOM are normal.   Neck: Normal range of motion. Neck supple. No JVD present. No tracheal deviation present. No thyromegaly present. Cardiovascular: Normal rate, regular rhythm, normal heart sounds and intact distal pulses. Pulmonary/Chest: Effort normal and breath sounds normal. No stridor. No respiratory distress. He has no wheezes. He has no rales. He exhibits no tenderness. Abdominal: Soft. Bowel sounds are normal. He exhibits no distension and no mass. There is no tenderness. There is no rebound and no guarding. No hernia. Musculoskeletal: Normal range of motion. Lymphadenopathy:    Patient has no cervical adenopathy. Neurological: Patient is alert and oriented to person, place, and time. Psychiatric: Patient has a normal mood and affect.  Patient behavior is normal.       LABORATORY DATA: Reviewed  Lab Results   Component Value Date    WBC 7.6 06/13/2020    HGB 14.6 06/13/2020    HCT 43.1 06/13/2020    MCV 93.1 06/13/2020     06/13/2020     (L) 02/19/2021    K 4.6 02/19/2021    CL 98 02/19/2021    CO2 27 02/19/2021    BUN 11 02/19/2021    CREATININE 0.70 02/19/2021    LABALBU 4.3 06/13/2020    BILITOT 0.76 06/13/2020    ALKPHOS 78 06/13/2020    AST 35 (H) 06/13/2020    ALT 28 06/13/2020         Lab Results radiology read this appears to be likely related to the patient's celiac disease      TTG improving, only mildly elevated    Assessment  1. Celiac disease        PLAN:    1) chronic celiac disease-repeat TTG only --- GFD strongly recommended. The patient reports no significant GI symptoms. Abdominal pain the right lower quadrant resolved. Remains compliant with gluten-free diet. 2) follow-up TTG, no extraintestinal manifestations currently. 3) follow-up in 3 months    Thank you for allowing me to participate in the care of Ms. Tiara Gomez. For any further questions please do not hesitate to contact me. I have reviewed and agree with the ROS entered by the MA/LPN from today's encounter documented in a separate note. Lee Goodpasture MD, MPH   [facility]astro[facility]  Office #: [phone]    this note is created with the assistance of a speech recognition program.  While intending to generate a document that actually reflects the content of the visit, the document can still have some errors including those of syntax and sound a like substitutions which may escape proof reading. It such instances, actual meaning can be extrapolated by contextual diversion.

## 2022-03-15 ENCOUNTER — HOSPITAL ENCOUNTER (OUTPATIENT)
Age: 52
Discharge: HOME | End: 2022-03-15
Payer: COMMERCIAL

## 2022-03-15 DIAGNOSIS — K90.0 CELIAC DISEASE: ICD-10-CM

## 2022-03-15 PROCEDURE — 83516 IMMUNOASSAY NONANTIBODY: CPT

## 2022-03-15 PROCEDURE — 36415 COLL VENOUS BLD VENIPUNCTURE: CPT

## 2022-03-15 PROCEDURE — 86256 FLUORESCENT ANTIBODY TITER: CPT

## 2022-03-16 LAB — TISSUE TRANSGLUTAMINASE IGA: 9.6 U/ML

## 2022-03-19 LAB — ENDOMYSIAL IGA ANTIBODY: NORMAL

## 2022-03-22 ENCOUNTER — OFFICE VISIT (OUTPATIENT)
Dept: GASTROENTEROLOGY | Age: 52
End: 2022-03-22
Payer: COMMERCIAL

## 2022-03-22 ENCOUNTER — TELEPHONE (OUTPATIENT)
Dept: GASTROENTEROLOGY | Age: 52
End: 2022-03-22

## 2022-03-22 VITALS — SYSTOLIC BLOOD PRESSURE: 115 MMHG | BODY MASS INDEX: 23.56 KG/M2 | DIASTOLIC BLOOD PRESSURE: 73 MMHG | WEIGHT: 133 LBS

## 2022-03-22 DIAGNOSIS — K90.0 CELIAC DISEASE: Primary | ICD-10-CM

## 2022-03-22 PROCEDURE — 99213 OFFICE O/P EST LOW 20 MIN: CPT | Performed by: INTERNAL MEDICINE

## 2022-03-22 ASSESSMENT — ENCOUNTER SYMPTOMS
EYES NEGATIVE: 1
RESPIRATORY NEGATIVE: 1
BACK PAIN: 1
GASTROINTESTINAL NEGATIVE: 1

## 2022-03-22 NOTE — PROGRESS NOTES
GI FOLLOW UP    INTERVAL HISTORY:     Maintaining gluten-free diet  Most recent TTG IgA was 9.6, modest decline from previous of 9.8    Chief Complaint   Patient presents with    Follow-up     celiac lab follow up     Celiac Disease     PAtient states adhering to GFD. States having normal bowel movements. Denies having any abdominal pain. 1. Celiac disease          HISTORY OF PRESENT ILLNESS: Ms.Julie SULLY Daniels is a 52 y. o. female with a past history remarkable for a remote history of Graves' disease status post radioactive iodine with medication induced hypothyroidism currently on Synthroid 125 mcg daily, had a subacute symptoms of right-sided flank and right abdominal pain that persisted and prompted a CT abdomen by the primary care physician, results revealed right sided ascending colonic wall thickening and an isolated 1 cm gretchen-mesenteric lymph node, referred for evaluation of of these findings along with her right side abdominal pain.     Patient states that abdominal pain was not associated with any bowel movements or p.o. intake.  Patient has not has not noted any obvious dietary triggers, no prior history of similar presentation, no sick contacts. Mort [place] has not had any diarrhea or constipation and has yet to have any upper GI symptoms.  Patient denies any changes in weight or blood in her stool.     Patient celiac studies were strongly consistent with celiac disease.  Her TTG IgA was strongly positive and greater than 128.  Her GDA also strongly positive.  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     Patient's PMH/PSH,SH,PSYCH Hx, MEDs, ALLERGIES, and ROS were all reviewed and updated in the appropriate sections.     PAST MEDICAL HISTORY:  Past Medical History:   Diagnosis Date    Abdominal pain     Abnormal Pap smear of cervix     Anxiety     Hypothyroidism     Wears glasses        Past Surgical History:   Procedure Laterality Date     SECTION  G2, 2007    COLONOSCOPY N/A 7/10/2020    COLONOSCOPY WITH BIOPSY performed by Jose Garza MD at [facility]    [place] LAP,CHOLECYSTECTOMY N/A 2018    XI ROBOTIC LAPAROSCOPIC CHOLECYSTECTOMY, performed by Hernan Valdez IV, DO at [address] N/A 7/10/2020    EGD BIOPSY performed by Jose Garza MD at [facility]       CURRENT MEDICATIONS:    Current Outpatient Medications:     SYNTHROID 125 MCG tablet, , Disp: , Rfl:     vitamin D (ERGOCALCIFEROL) 1.25 MG (08007 UT) CAPS capsule, TAKE 1 CAPSULE BY MOUTH ONE TIME PER WEEK, Disp: 12 capsule, Rfl: 0    ALLERGIES:   No Known Allergies    FAMILY HISTORY:       Problem Relation Age of Onset    Cancer Father     Other Sister         cerebral hemmorage    Other Maternal Aunt         cerebral hemmorage         SOCIAL HISTORY:   Social History     Socioeconomic History    Marital status:      Spouse name: Not on file    Number of children: Not on file    Years of education: Not on file    Highest education level: Not on file   Occupational History    Not on file   Tobacco Use    Smoking status: Never Smoker    Smokeless tobacco: Never Used   Vaping Use    Vaping Use: Never used   Substance and Sexual Activity    Alcohol use: Yes     Comment: wine a few times a week    Drug use: No    Sexual activity: Yes     Partners: Male   Other Topics Concern    Not on file   Social History Narrative    Not on file     Social Determinants of Health     Financial Resource Strain: Low Risk     Difficulty of Paying Living Expenses: Not hard at all   Food Insecurity: No Food Insecurity    Worried About Running Out of Food in the Last Year: Never true    Reva of Food in the Last Year: Never true   Transportation Needs:     Lack of Transportation (Medical): Not on file    Lack of Transportation (Non-Medical): Not on file   Physical Activity:     Days of Exercise per Week: Not on file    Minutes of Exercise per Session: Not on file   Stress:     Feeling of Stress : Not on file   Social Connections:     Frequency of Communication with Friends and Family: Not on file    Frequency of Social Gatherings with Friends and Family: Not on file    Attends [employer] Services: Not on file    Active Member of [employer] or Organizations: Not on file    Attends Club or Organization Meetings: Not on file    Marital Status: Not on file   Intimate Partner Violence:     Fear of Current or Ex-Partner: Not on file    Emotionally Abused: Not on file    Physically Abused: Not on file    Sexually Abused: Not on file   Housing Stability:     Unable to Pay for Housing in the Last Year: Not on file    Number of [place] in the Last Year: Not on file    Unstable Housing in the Last Year: Not on file       REVIEW OF SYSTEMS: A 12-point review of systems was obtained and pertinent positives and negatives were listed below. REVIEW OF SYSTEMS:     Constitutional: No fever, no chills, no lethargy, no weakness. HEENT:  No headache, otalgia, itchy eyes, nasal discharge or sore throat. Cardiac:  No chest pain, dyspnea, orthopnea or PND. Chest:   No cough, phlegm or wheezing. Abdomen:      Detailed by MA   Neuro:  No focal weakness, abnormal movements or seizure like activity. Skin:   No rashes, no itching. :   No hematuria, no pyuria, no dysuria, no flank pain. Extremities:  No swelling or joint pains. ROS was otherwise negative    Review of Systems   Constitutional: Negative. HENT: Negative. Eyes: Negative. Respiratory: Negative. Cardiovascular: Negative. Gastrointestinal: Negative. Endocrine: Negative. Genitourinary: Negative. Musculoskeletal: Positive for back pain. Skin: Negative. Allergic/Immunologic: Positive for food allergies. Neurological: Negative. Hematological: Negative. Psychiatric/Behavioral: Negative. All other systems reviewed and are negative. PHYSICAL EXAMINATION: Vital signs reviewed per the nursing documentation. /73   Wt 133 lb (60.3 kg)   LMP 04/22/2016 (Approximate)   BMI 23.56 kg/m²   Body mass index is 23.56 kg/m². Physical Exam    Physical Exam   Constitutional: Patient is oriented to person, place, and time. Patient appears well-developed and well-nourished. HENT:   Head: Normocephalic and atraumatic. Eyes: Pupils are equal, round, and reactive to light. EOM are normal.   Neck: Normal range of motion. Neck supple. No JVD present. No tracheal deviation present. No thyromegaly present. Cardiovascular: Normal rate, regular rhythm, normal heart sounds and intact distal pulses. Pulmonary/Chest: Effort normal and breath sounds normal. No stridor. No respiratory distress. He has no wheezes. He has no rales. He exhibits no tenderness. Abdominal: Soft. Bowel sounds are normal. He exhibits no distension and no mass. There is no tenderness. There is no rebound and no guarding. No hernia. Musculoskeletal: Normal range of motion. Lymphadenopathy:    Patient has no cervical adenopathy. Neurological: Patient is alert and oriented to person, place, and time. Psychiatric: Patient has a normal mood and affect.  Patient behavior is normal.       LABORATORY DATA: Reviewed  Lab Results   Component Value Date    WBC 7.6 06/13/2020    HGB 14.6 06/13/2020    HCT 43.1 06/13/2020    MCV 93.1 06/13/2020     06/13/2020     (L) 02/19/2021    K 4.6 02/19/2021    CL 98 02/19/2021    CO2 27 02/19/2021    BUN 11 02/19/2021    CREATININE 0.70 02/19/2021    LABALBU 4.3 06/13/2020    BILITOT 0.76 06/13/2020    ALKPHOS 78 06/13/2020    AST 35 (H) 06/13/2020    ALT 28 06/13/2020 Lab Results   Component Value Date    RBC 4.63 06/13/2020    HGB 14.6 06/13/2020    MCV 93.1 06/13/2020    MCH 31.5 06/13/2020    MCHC 33.9 06/13/2020    RDW 11.6 (L) 06/13/2020    MPV 9.2 06/13/2020    BASOPCT 1 06/13/2020    LYMPHSABS 2.00 06/13/2020    MONOSABS 0.72 06/13/2020    NEUTROABS 4.42 06/13/2020    EOSABS 0.43 06/13/2020    BASOSABS 0.05 06/13/2020         DIAGNOSTIC TESTING:     No results found.            IMPRESSION: Ms.Julie SULLY Daniels is a 54 y.o. female with a past history remarkable for a remote history of Graves' disease status post radioactive iodine with medication induced hypothyroidism currently on Synthroid 125 mcg daily, had a subacute symptoms of right-sided flank and right abdominal pain that persisted and prompted a CT abdomen by the primary care physician, results revealed right sided ascending colonic wall thickening and an isolated 1 cm gretchen-mesenteric lymph node, referred for evaluation of of these findings along with her right side abdominal pain.     Patient states that abdominal pain was not associated with any bowel movements or p.o. intake.  Patient has not has not noted any obvious dietary triggers, no prior history of similar presentation, no sick contacts. Roman Evans has not had any diarrhea or constipation and has yet to have any upper GI symptoms.  Patient denies any changes in weight or blood in her stool.     Patient celiac studies were strongly consistent with celiac disease.  Her TTG IgA was strongly positive and greater than 128.  Her GDA also strongly positive.  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  Unclear whether the patient still has unrecovered and celiac disease with minor activity     CT scan initially obtained revealed some colonic wall thickening with some reactive lymphadenopathy identified on colon.  Per radiology read this appears to be likely related to the patient's celiac disease        TTG improving, only mildly elevated but remains elevated and has been elevated for approximately 12 months  Patient currently on [facility] free diet      Assessment  1. Celiac disease        PLAN:    1) mild refractory celiac disease despite maintaining strict GFD--- we will plan for diagnostic upper endoscopy to evaluate for residual villous blunting. If no duodenal biopsies are negative then the patient may need referral to tertiary care center where no celiac genetic testing can be performed. The patient continues to have mild right lower quadrant abdominal pain. 2) follow-up after procedure. Further recommendations will after the procedure. Thank you for allowing me to participate in the care of Ms. Winston Nunez. For any further questions please do not hesitate to contact me. I have reviewed and agree with the ROS entered by the MA/LPN from today's encounter documented in a separate note. Maggie Monge MD, MPH   [facility]astroenter[facility]  Office #: [phone]    this note is created with the assistance of a speech recognition program.  While intending to generate a document that actually reflects the content of the visit, the document can still have some errors including those of syntax and sound a like substitutions which may escape proof reading. It such instances, actual meaning can be extrapolated by contextual diversion.

## 2022-04-18 ENCOUNTER — ANESTHESIA EVENT (OUTPATIENT)
Dept: OPERATING ROOM | Age: 52
End: 2022-04-18
Payer: COMMERCIAL

## 2022-04-19 ENCOUNTER — HOSPITAL ENCOUNTER (OUTPATIENT)
Age: 52
Setting detail: OUTPATIENT SURGERY
Discharge: HOME | End: 2022-04-19
Attending: INTERNAL MEDICINE | Admitting: INTERNAL MEDICINE
Payer: COMMERCIAL

## 2022-04-19 ENCOUNTER — ANESTHESIA (OUTPATIENT)
Dept: OPERATING ROOM | Age: 52
End: 2022-04-19
Payer: COMMERCIAL

## 2022-04-19 VITALS
RESPIRATION RATE: 11 BRPM | OXYGEN SATURATION: 98 % | WEIGHT: 134 LBS | HEIGHT: 63 IN | SYSTOLIC BLOOD PRESSURE: 106 MMHG | BODY MASS INDEX: 23.74 KG/M2 | DIASTOLIC BLOOD PRESSURE: 73 MMHG | TEMPERATURE: 96.9 F | HEART RATE: 65 BPM

## 2022-04-19 VITALS — OXYGEN SATURATION: 98 % | SYSTOLIC BLOOD PRESSURE: 93 MMHG | DIASTOLIC BLOOD PRESSURE: 54 MMHG | TEMPERATURE: 96.8 F

## 2022-04-19 PROCEDURE — 7100000010 HC PHASE II RECOVERY - FIRST 15 MIN: Performed by: INTERNAL MEDICINE

## 2022-04-19 PROCEDURE — 6360000002 HC RX W HCPCS: Performed by: NURSE ANESTHETIST, CERTIFIED REGISTERED

## 2022-04-19 PROCEDURE — 88305 TISSUE EXAM BY PATHOLOGIST: CPT

## 2022-04-19 PROCEDURE — 43239 EGD BIOPSY SINGLE/MULTIPLE: CPT | Performed by: INTERNAL MEDICINE

## 2022-04-19 PROCEDURE — 3700000001 HC ADD 15 MINUTES (ANESTHESIA): Performed by: INTERNAL MEDICINE

## 2022-04-19 PROCEDURE — 2500000003 HC RX 250 WO HCPCS: Performed by: NURSE ANESTHETIST, CERTIFIED REGISTERED

## 2022-04-19 PROCEDURE — 7100000011 HC PHASE II RECOVERY - ADDTL 15 MIN: Performed by: INTERNAL MEDICINE

## 2022-04-19 PROCEDURE — 3700000000 HC ANESTHESIA ATTENDED CARE: Performed by: INTERNAL MEDICINE

## 2022-04-19 PROCEDURE — 2580000003 HC RX 258: Performed by: NURSE ANESTHETIST, CERTIFIED REGISTERED

## 2022-04-19 PROCEDURE — 2709999900 HC NON-CHARGEABLE SUPPLY: Performed by: INTERNAL MEDICINE

## 2022-04-19 PROCEDURE — 3609012400 HC EGD TRANSORAL BIOPSY SINGLE/MULTIPLE: Performed by: INTERNAL MEDICINE

## 2022-04-19 RX ORDER — OXYCODONE HYDROCHLORIDE AND ACETAMINOPHEN 5; 325 MG/1; MG/1
2 TABLET ORAL
Status: DISCONTINUED | OUTPATIENT
Start: 2022-04-19 | End: 2022-04-19 | Stop reason: HOSPADM

## 2022-04-19 RX ORDER — OXYCODONE HYDROCHLORIDE AND ACETAMINOPHEN 5; 325 MG/1; MG/1
1 TABLET ORAL
Status: DISCONTINUED | OUTPATIENT
Start: 2022-04-19 | End: 2022-04-19 | Stop reason: HOSPADM

## 2022-04-19 RX ORDER — SODIUM CHLORIDE, SODIUM LACTATE, POTASSIUM CHLORIDE, CALCIUM CHLORIDE 600; 310; 30; 20 MG/100ML; MG/100ML; MG/100ML; MG/100ML
INJECTION, SOLUTION INTRAVENOUS CONTINUOUS
Status: DISCONTINUED | OUTPATIENT
Start: 2022-04-19 | End: 2022-04-19 | Stop reason: HOSPADM

## 2022-04-19 RX ORDER — MEPERIDINE HYDROCHLORIDE 50 MG/ML
12.5 INJECTION INTRAMUSCULAR; INTRAVENOUS; SUBCUTANEOUS EVERY 5 MIN PRN
Status: DISCONTINUED | OUTPATIENT
Start: 2022-04-19 | End: 2022-04-19 | Stop reason: HOSPADM

## 2022-04-19 RX ORDER — SODIUM CHLORIDE 0.9 % (FLUSH) 0.9 %
5-40 SYRINGE (ML) INJECTION EVERY 12 HOURS SCHEDULED
Status: DISCONTINUED | OUTPATIENT
Start: 2022-04-19 | End: 2022-04-19 | Stop reason: HOSPADM

## 2022-04-19 RX ORDER — PROMETHAZINE HYDROCHLORIDE 25 MG/ML
6.25 INJECTION, SOLUTION INTRAMUSCULAR; INTRAVENOUS EVERY 5 MIN PRN
Status: DISCONTINUED | OUTPATIENT
Start: 2022-04-19 | End: 2022-04-19 | Stop reason: HOSPADM

## 2022-04-19 RX ORDER — DIPHENHYDRAMINE HYDROCHLORIDE 50 MG/ML
12.5 INJECTION INTRAMUSCULAR; INTRAVENOUS
Status: DISCONTINUED | OUTPATIENT
Start: 2022-04-19 | End: 2022-04-19 | Stop reason: HOSPADM

## 2022-04-19 RX ORDER — IPRATROPIUM BROMIDE AND ALBUTEROL SULFATE 2.5; .5 MG/3ML; MG/3ML
1 SOLUTION RESPIRATORY (INHALATION)
Status: DISCONTINUED | OUTPATIENT
Start: 2022-04-19 | End: 2022-04-19 | Stop reason: HOSPADM

## 2022-04-19 RX ORDER — SODIUM CHLORIDE, SODIUM LACTATE, POTASSIUM CHLORIDE, CALCIUM CHLORIDE 600; 310; 30; 20 MG/100ML; MG/100ML; MG/100ML; MG/100ML
INJECTION, SOLUTION INTRAVENOUS CONTINUOUS PRN
Status: DISCONTINUED | OUTPATIENT
Start: 2022-04-19 | End: 2022-04-19 | Stop reason: SDUPTHER

## 2022-04-19 RX ORDER — SODIUM CHLORIDE 9 MG/ML
25 INJECTION, SOLUTION INTRAVENOUS PRN
Status: DISCONTINUED | OUTPATIENT
Start: 2022-04-19 | End: 2022-04-19 | Stop reason: HOSPADM

## 2022-04-19 RX ORDER — MIDAZOLAM HYDROCHLORIDE 1 MG/ML
INJECTION INTRAMUSCULAR; INTRAVENOUS PRN
Status: DISCONTINUED | OUTPATIENT
Start: 2022-04-19 | End: 2022-04-19 | Stop reason: SDUPTHER

## 2022-04-19 RX ORDER — LABETALOL 20 MG/4 ML (5 MG/ML) INTRAVENOUS SYRINGE
10
Status: DISCONTINUED | OUTPATIENT
Start: 2022-04-19 | End: 2022-04-19 | Stop reason: HOSPADM

## 2022-04-19 RX ORDER — SODIUM CHLORIDE 0.9 % (FLUSH) 0.9 %
10 SYRINGE (ML) INJECTION PRN
Status: DISCONTINUED | OUTPATIENT
Start: 2022-04-19 | End: 2022-04-19 | Stop reason: HOSPADM

## 2022-04-19 RX ORDER — SODIUM CHLORIDE 0.9 % (FLUSH) 0.9 %
10 SYRINGE (ML) INJECTION EVERY 12 HOURS SCHEDULED
Status: DISCONTINUED | OUTPATIENT
Start: 2022-04-19 | End: 2022-04-19 | Stop reason: HOSPADM

## 2022-04-19 RX ORDER — MIDAZOLAM HYDROCHLORIDE 2 MG/2ML
2 INJECTION, SOLUTION INTRAMUSCULAR; INTRAVENOUS
Status: DISCONTINUED | OUTPATIENT
Start: 2022-04-19 | End: 2022-04-19 | Stop reason: HOSPADM

## 2022-04-19 RX ORDER — SODIUM CHLORIDE 0.9 % (FLUSH) 0.9 %
5-40 SYRINGE (ML) INJECTION PRN
Status: DISCONTINUED | OUTPATIENT
Start: 2022-04-19 | End: 2022-04-19 | Stop reason: HOSPADM

## 2022-04-19 RX ORDER — MORPHINE SULFATE 2 MG/ML
2 INJECTION, SOLUTION INTRAMUSCULAR; INTRAVENOUS EVERY 5 MIN PRN
Status: DISCONTINUED | OUTPATIENT
Start: 2022-04-19 | End: 2022-04-19 | Stop reason: HOSPADM

## 2022-04-19 RX ORDER — LIDOCAINE HYDROCHLORIDE 10 MG/ML
INJECTION, SOLUTION INFILTRATION; PERINEURAL PRN
Status: DISCONTINUED | OUTPATIENT
Start: 2022-04-19 | End: 2022-04-19 | Stop reason: SDUPTHER

## 2022-04-19 RX ORDER — ONDANSETRON 2 MG/ML
4 INJECTION INTRAMUSCULAR; INTRAVENOUS
Status: DISCONTINUED | OUTPATIENT
Start: 2022-04-19 | End: 2022-04-19 | Stop reason: HOSPADM

## 2022-04-19 RX ORDER — PROPOFOL 10 MG/ML
INJECTION, EMULSION INTRAVENOUS PRN
Status: DISCONTINUED | OUTPATIENT
Start: 2022-04-19 | End: 2022-04-19 | Stop reason: SDUPTHER

## 2022-04-19 RX ORDER — SODIUM CHLORIDE 9 MG/ML
INJECTION, SOLUTION INTRAVENOUS CONTINUOUS
Status: DISCONTINUED | OUTPATIENT
Start: 2022-04-19 | End: 2022-04-19 | Stop reason: HOSPADM

## 2022-04-19 RX ADMIN — PROPOFOL 50 MG: 10 INJECTION, EMULSION INTRAVENOUS at 09:44

## 2022-04-19 RX ADMIN — PROPOFOL 20 MG: 10 INJECTION, EMULSION INTRAVENOUS at 09:46

## 2022-04-19 RX ADMIN — LIDOCAINE HYDROCHLORIDE 50 MG: 10 INJECTION, SOLUTION INFILTRATION; PERINEURAL at 09:41

## 2022-04-19 RX ADMIN — MIDAZOLAM HYDROCHLORIDE 2 MG: 1 INJECTION, SOLUTION INTRAMUSCULAR; INTRAVENOUS at 09:39

## 2022-04-19 RX ADMIN — PROPOFOL 30 MG: 10 INJECTION, EMULSION INTRAVENOUS at 09:45

## 2022-04-19 RX ADMIN — SODIUM CHLORIDE, POTASSIUM CHLORIDE, SODIUM LACTATE AND CALCIUM CHLORIDE: 600; 310; 30; 20 INJECTION, SOLUTION INTRAVENOUS at 09:39

## 2022-04-19 ASSESSMENT — PULMONARY FUNCTION TESTS
PIF_VALUE: 1

## 2022-04-19 ASSESSMENT — PAIN - FUNCTIONAL ASSESSMENT: PAIN_FUNCTIONAL_ASSESSMENT: 0-10

## 2022-04-19 NOTE — ANESTHESIA PRE PROCEDURE
Department of Anesthesiology  Preprocedure Note       Name:  Jonna Sequeira   Age:  50 y.o.  :  1970                                          MRN:  [id number]         Date:  2022      Surgeon: Jenny Bartlett):  Latisha Ambriz MD    Procedure: Procedure(s):  EGD BIOPSY    Medications prior to admission:   Prior to Admission medications    Medication Sig Start Date End Date Taking?  Authorizing Provider   vitamin D (ERGOCALCIFEROL) 1.25 MG (98872 UT) CAPS capsule TAKE 1 CAPSULE BY MOUTH ONE TIME PER WEEK 21   Kaylee Rivers MD   SYNTHROID 125 MCG tablet  16   Historical Provider, MD       Current medications:    Current Facility-Administered Medications   Medication Dose Route Frequency Provider Last Rate Last Admin    0.9 % sodium chloride infusion   IntraVENous Continuous Candace Diaz MD        lactated ringers infusion   IntraVENous Continuous Candace Diaz MD        sodium chloride flush 0.9 % injection 10 mL  10 mL IntraVENous 2 times per day Candace Diaz MD        sodium chloride flush 0.9 % injection 10 mL  10 mL IntraVENous PRN Candace Diaz MD        0.9 % sodium chloride infusion  25 mL IntraVENous PRN Candace Diaz MD           Allergies:  No Known Allergies    Problem List:    Patient Active Problem List   Diagnosis Code    ASC-H, +HPV 18 on 12/3/19 R87.611    Colitis K52.9    Celiac disease K90.0    Serrated adenoma of colon D12.6    Tubular adenoma of colon D12.6    Dysplasia of cervix, low grade (GENI 1) N87.0       Past Medical History:        Diagnosis Date    Abdominal pain     Abnormal Pap smear of cervix     Anxiety     Celiac disease     Hypothyroidism     Wears glasses        Past Surgical History:        Procedure Laterality Date     SECTION  G2,     COLONOSCOPY N/A 7/10/2020    COLONOSCOPY WITH BIOPSY performed by Latisha Ambriz MD at [facility]    [place] LAP,CHOLECYSTECTOMY N/A 2018    XI ROBOTIC LAPAROSCOPIC CHOLECYSTECTOMY, performed by Darylene Kava Canos IV, DO at [address] N/A 7/10/2020    EGD BIOPSY performed by Hillary Ortiz MD at [facility]       Social History:    Social History     Tobacco Use    Smoking status: Never Smoker    Smokeless tobacco: Never Used   Substance Use Topics    Alcohol use: Yes     Comment: wine a few times a week                                Counseling given: Not Answered      Vital Signs (Current):   Vitals:    04/19/22 0849   BP: 111/84   Pulse: 66   Resp: 16   Temp: 96.9 °F (36.1 °C)   TempSrc: Infrared   SpO2: 97%   Weight: 134 lb (60.8 kg)   Height: 5' 3\" (1.6 m)                                              BP Readings from Last 3 Encounters:   04/19/22 111/84   03/22/22 115/73   12/21/21 114/76       NPO Status: Time of last liquid consumption: 2300                        Time of last solid consumption: 2200                        Date of last liquid consumption: 04/18/22                        Date of last solid food consumption: 04/18/22    BMI:   Wt Readings from Last 3 Encounters:   04/19/22 134 lb (60.8 kg)   03/22/22 133 lb (60.3 kg)   12/21/21 129 lb (58.5 kg)     Body mass index is 23.74 kg/m². CBC:   Lab Results   Component Value Date    WBC 7.6 06/13/2020    RBC 4.63 06/13/2020    HGB 14.6 06/13/2020    HCT 43.1 06/13/2020    MCV 93.1 06/13/2020    RDW 11.6 06/13/2020     06/13/2020       CMP:   Lab Results   Component Value Date     02/19/2021    K 4.6 02/19/2021    CL 98 02/19/2021    CO2 27 02/19/2021    BUN 11 02/19/2021    CREATININE 0.70 02/19/2021    GFRAA >60 02/19/2021    LABGLOM >60 02/19/2021    GLUCOSE 88 02/19/2021    PROT 6.8 06/13/2020    CALCIUM 9.3 02/19/2021    BILITOT 0.76 06/13/2020    ALKPHOS 78 06/13/2020    AST 35 06/13/2020    ALT 28 06/13/2020       POC Tests: No results for input(s): POCGLU, POCNA, POCK, POCCL, POCBUN, POCHEMO, POCHCT in the last 72 hours.     Coags: No results found for: PROTIME, INR, APTT    HCG (If Applicable): No results found for: PREGTESTUR, PREGSERUM, HCG, HCGQUANT     ABGs: No results found for: PHART, PO2ART, DBG7VSY, WUF2BBF, BEART, A8PXZDVY     Type & Screen (If Applicable):  No results found for: LABABO, LABRH    Drug/Infectious Status (If Applicable):  No results found for: HIV, HEPCAB    COVID-19 Screening (If Applicable):   Lab Results   Component Value Date    COVID19 Not Detected 07/07/2020           Anesthesia Evaluation  Patient summary reviewed and Nursing notes reviewed  Airway: Mallampati: III  TM distance: >3 FB   Neck ROM: full  Mouth opening: > = 3 FB Dental: normal exam         Pulmonary:Negative Pulmonary ROS and normal exam                               Cardiovascular:Negative CV ROS                      Neuro/Psych:   Negative Neuro/Psych ROS              GI/Hepatic/Renal: Neg GI/Hepatic/Renal ROS            Endo/Other:    (+) hypothyroidism::., .                  ROS comment: -NPO AFTER MIDNIGHT  -NKDA Abdominal:             Vascular: negative vascular ROS. Other Findings:             Anesthesia Plan      MAC     ASA 2       Induction: intravenous. MIPS: Postoperative opioids intended and Prophylactic antiemetics administered. Anesthetic plan and risks discussed with patient. Plan discussed with CRNA.     Attending anesthesiologist reviewed and agrees with Easton Jurado MD   4/19/2022

## 2022-04-19 NOTE — H&P
Procedure History and Physical    Pre-Procedural Diagnosis:  Celiac disease, refractory disease    Indications:  same    Procedure Planned: endoscopy     History Obtained From:  patient    HISTORY OF PRESENT ILLNESS:       The patient is a 50 y.o. female who presents for the above procedure.         Past Medical History:    Past Medical History:   Diagnosis Date    Abdominal pain     Abnormal Pap smear of cervix     Anxiety     Celiac disease     Hypothyroidism     Wears glasses        Past Surgical History:    Past Surgical History:   Procedure Laterality Date     SECTION  G2, 2007    COLONOSCOPY N/A 7/10/2020    COLONOSCOPY WITH BIOPSY performed by [facility]iz [facility] at [facility]    NM LAP,CHOLECYSTECTOMY N/A 2018    XI ROBOTIC LAPAROSCOPIC CHOLECYSTECTOMY, performed by Linda Valdez IV, DO at [address] [place] [address] N/A 7/10/2020    EGD BIOPSY performed by Clara Ruiz MD at [facility]       Medications:  Current Facility-Administered Medications   Medication Dose Route Frequency Provider Last Rate Last Admin    0.9 % sodium chloride infusion   IntraVENous Continuous Thor Less, MD        lactated ringers infusion   IntraVENous Continuous Thor Less, MD        sodium chloride flush 0.9 % injection 10 mL  10 mL IntraVENous 2 times per day Thor Less, MD        sodium chloride flush 0.9 % injection 10 mL  10 mL IntraVENous PRN Thor Less, MD        0.9 % sodium chloride infusion  25 mL IntraVENous PRN Thor Less, MD        sodium chloride flush 0.9 % injection 5-40 mL  5-40 mL IntraVENous 2 times per day Franklin Proctor MD        sodium chloride flush 0.9 % injection 5-40 mL  5-40 mL IntraVENous PRN Franklin Proctor MD        0.9 % sodium chloride infusion  25 mL IntraVENous PRN Franklin Proctor MD        meperidine (DEMEROL) injection 12.5 mg  12.5 mg IntraVENous Q5 Min PRN Franklin Proctor MD        morphine (PF) injection 2 mg  2 mg IntraVENous Q5 Min PRN Carmen Jaime MD        HYDROmorphone (DILAUDID) injection 0.5 mg  0.5 mg IntraVENous Q5 Min PRN Carmen Jaime MD        ondansetron (ZOFRAN) injection 4 mg  4 mg IntraVENous Once PRN Carmen Jaime MD        midazolam PF (VERSED) injection 2 mg  2 mg IntraVENous Once PRN Carmen Jaime MD        diphenhydrAMINE (BENADRYL) injection 12.5 mg  12.5 mg IntraVENous Once PRN Carmen Jaime MD        labetalol (NORMODYNE;TRANDATE) injection syringe 10 mg  10 mg IntraVENous Q15 Min PRN Carmen Jaime MD        ipratropium-albuterol (DUONEB) nebulizer solution 1 ampule  1 ampule Inhalation Once PRN Carmen Jaime MD        oxyCODONE-acetaminophen (PERCOCET) 5-325 MG per tablet 1 tablet  1 tablet Oral Once PRN Carmen Jaime MD        oxyCODONE-acetaminophen (PERCOCET) 5-325 MG per tablet 2 tablet  2 tablet Oral Once PRN Carmen Jaime MD        promethazine (PHENERGAN) injection 6.25 mg  6.25 mg IntraMUSCular Q5 Min PRN Carmen Jaime MD           Allergies:   No Known Allergies              Social   Social History     Tobacco Use    Smoking status: Never Smoker    Smokeless tobacco: Never Used   Substance Use Topics    Alcohol use: Yes     Comment: wine a few times a week        PSYCH HISTORY:  Depression No  Anxiety No  Suicide No       Family History   Problem Relation Age of Onset    Cancer Father     Other Sister         cerebral hemmorage    Other Maternal Aunt         cerebral hemmorage      No family history of colon cancer, Crohn's disease, or ulcerative colitis    Problems with Sedation/Anesthesia in the past? no    REVIEW OF SYSTEMS:  12 point review of systems negative other than mentioned above.       PHYSICAL EXAM:    Vitals:  /84   Pulse 66   Temp 96.9 °F (36.1 °C) (Infrared)   Resp 16   Ht 5' 3\" (1.6 m)   Wt 134 lb (60.8 kg)   LMP 04/22/2016 (Approximate)   SpO2 97%   BMI 23.74 kg/m²     Focused Exam related to procedure:    General appearance: NAD, conversant   Eyes: anicteric sclerae, moist conjunctivae; no lid-lag; PERRLA   Lungs: CTA, with normal respiratory effort and no intercostal retractions   CV: RRR, no MRGs   Abdomen: Soft, non-tender; no masses or HSM   Skin: Normal temperature, turgor and texture; no rash, ulcers or subcutaneous nodules     DATA:  CBC:   Lab Results   Component Value Date    WBC 7.6 06/13/2020    HGB 14.6 06/13/2020    HCT 43.1 06/13/2020    MCV 93.1 06/13/2020     06/13/2020     BUN/Cr:   Lab Results   Component Value Date    BUN 11 02/19/2021   ,   Lab Results   Component Value Date    CREATININE 0.70 02/19/2021     Potassium:   Lab Results   Component Value Date    K 4.6 02/19/2021     PT/INR: No results found for: INR, PROTIME    ASSESSMENT AND PLAN:       1. Patient is a 50 y.o. female with above specified procedure planned. Expected Sedation/Anesthesia Type: MAC    2. ASA ([address] Anesthesiology) Anesthesia Status: Class 2 - A normal healthy patient with mild systemic disease    3. Mallampati: II (soft palate, uvula, fauces visible)  4. Procedure options, risks and benefits reviewed with Patient. Patient expresses understanding.     5.  Consent has been signed:  Yes    Singh Cisneros MD

## 2022-04-19 NOTE — OP NOTE
Operative Note      Patient: Jonna Sequeira  YOB: 1970  MRN: [id number]    Date of Procedure: 4/19/2022    Pre-Op Diagnosis: CELIAC DISEASE, refractory disease, elevated serologies    Post-Op Diagnosis: Mild non-specific gastritis. Normal appearing duodenal mucosa       Procedure(s):  EGD BIOPSIES    Surgeon(s):  Latisha Ambriz MD    Assistant:   * No surgical staff found *    Anesthesia: Monitor Anesthesia Care    Estimated Blood Loss (mL): Minimal    Complications: None    Specimens:   ID Type Source Tests Collected by Time Destination   A : DUODENUM  Tissue Tissue SURGICAL PATHOLOGY Latisha Ambriz MD 4/19/2022 6393    B : STOMACH Tissue Tissue SURGICAL PATHOLOGY Latisha Ambriz MD 4/19/2022 9809        Implants:  * No implants in log *      Drains: * No LDAs found *          GASTROENTEROLOGY    [facility]    EGD    PROCEDURE DATE: 04/19/22    REFERRING PHYSICIAN: No ref. provider found     PRIMARY CARE PROVIDER: Kaylee Rivers MD    ATTENDING PHYSICIAN: Latisha Ambriz MD     HISTORY: Ms. Jonna Sequeira is a 50 y.o. female who presents to the  Endoscopy unit for upper endoscopy. The patient's clinical history is remarkable for Celiac disease. She is currently medically stable and appropriate for the planned procedure. PREOPERATIVE DIAGNOSIS: Refractory serological celiac disease. PROCEDURES:   1) Transoral Upper Endoscopy with cold biopsy. POSTOPERATIVE DIAGNOSIS:     1) Mild non-specific gastritis, no ulcerations, no erosions. Cold biopsy taken to evaluate for H. Pylori  2) Normal appearing duodenal mucosa, cold biopsy taken to evaluate for residual histopathological disease (Refractory disease)  3) Normal appearing esophageal mucosa     MEDICATIONS:   MAC per anesthesia     EBL: <10cc    INSTRUMENT: Olympus GIF-H190  flexible Gastroscope.      PREPARATION: The nature and character of the procedure as well as risks, benefits, and alternatives were discussed with the patient and informed consent was obtained. Complications were said to include, but were not limited to: medication allergy, medication reaction, cardiovascular and respiratory problems, bleeding, perforation, infection, and/or missed diagnosis. Following arrival in the endoscopy room, the patient was placed in the left lateral decubitus position and final time-out accomplished in the presence of the nursing staff. Baseline vital signs were obtained and reviewed, and IV sedation was subsequently initiated. FINDINGS:   Esophagus: The esophagus was inspected to the Z-line. The endoscopic exam showed normal appearing esophageal mucosa. Stomach: The stomach was inspected in both forward and retroflex fashion and was appropriately distensible. The cardia, fundus, incisura, antrum and pylorus were identified via direct visualization. The endoscopic exam showed mild gastritis. Duodenum: The proximal small bowel was inspected through the bulb, sweep, and second portion of the duodenum. The endoscopic exam showed normal appearing duodenal mucosa. IMPRESSION:    1) Mild non-specific gastritis, no ulcerations, no erosions. Cold biopsy taken to evaluate for H. Pylori  2) Normal appearing duodenal mucosa, cold biopsy taken to evaluate for residual histopathological disease (Refractory disease)  3) Normal appearing esophageal mucosa       RECOMMENDATIONS:   1) Follow up path and serological studies. Maintain GFD. [facility]  Gastroenterology   04/19/22    this note is created with the assistance of a speech recognition program.  While intending to generate a document that actually reflects the content of the visit, the document can still have some errors including those of syntax and sound a like substitutions which may escape proof reading. It such instances, actual meaning can be extrapolated by contextual diversion.     The patient was counseled at length about the risks of zenon Covid-19 during their perioperative period and any recovery window from their procedure. The patient was made aware that zenon Covid-19  may worsen their prognosis for recovering from their procedure  and lend to a higher morbidity and/or mortality risk. All material risks, benefits, and reasonable alternatives including postponing the procedure were discussed. The patient DOES wish to proceed with the procedure at this time.     Electronically signed by Alexis Nguyen MD on 4/19/2022 at 9:53 AM

## 2022-04-19 NOTE — ANESTHESIA POSTPROCEDURE EVALUATION
Department of Anesthesiology  Postprocedure Note    Patient: Sherly Peguero  MRN: [id number]  YOB: 1970  Date of evaluation: 4/19/2022  Time:  10:01 AM     Procedure Summary     Date: 04/19/22 Room / Location: [facility] 4[address] / [address] [facility]    Anesthesia Start: 2943 Anesthesia Stop: 0989    Procedure: EGD BIOPSIES (N/A ) Diagnosis: (CELIAC DISEASE)    Surgeons: Mariana Reddy MD Responsible Provider: Melo Corbin MD    Anesthesia Type: MAC ASA Status: 2          Anesthesia Type: MAC    Anastasia Phase I: Anastasia Score: 10    Anastasia Phase II: Anastasia Score: 8    Last vitals: Reviewed and per EMR flowsheets.        Anesthesia Post Evaluation    Patient location during evaluation: PACU  Patient participation: complete - patient participated  Level of consciousness: awake and alert  Airway patency: patent  Nausea & Vomiting: no nausea and no vomiting  Complications: no  Cardiovascular status: hemodynamically stable  Respiratory status: room air and spontaneous ventilation  Hydration status: euvolemic  Multimodal analgesia pain management approach

## 2022-04-21 LAB — SURGICAL PATHOLOGY REPORT: NORMAL

## 2022-05-31 ENCOUNTER — OFFICE VISIT (OUTPATIENT)
Dept: FAMILY MEDICINE CLINIC | Age: 52
End: 2022-05-31
Payer: COMMERCIAL

## 2022-05-31 VITALS
WEIGHT: 139 LBS | TEMPERATURE: 98.2 F | BODY MASS INDEX: 24.62 KG/M2 | OXYGEN SATURATION: 97 % | SYSTOLIC BLOOD PRESSURE: 105 MMHG | HEART RATE: 80 BPM | DIASTOLIC BLOOD PRESSURE: 74 MMHG

## 2022-05-31 DIAGNOSIS — Z12.31 ENCOUNTER FOR SCREENING MAMMOGRAM FOR MALIGNANT NEOPLASM OF BREAST: ICD-10-CM

## 2022-05-31 DIAGNOSIS — L73.9 FOLLICULITIS: Primary | ICD-10-CM

## 2022-05-31 PROCEDURE — 99213 OFFICE O/P EST LOW 20 MIN: CPT | Performed by: FAMILY MEDICINE

## 2022-05-31 SDOH — ECONOMIC STABILITY: FOOD INSECURITY: WITHIN THE PAST 12 MONTHS, THE FOOD YOU BOUGHT JUST DIDN'T LAST AND YOU DIDN'T HAVE MONEY TO GET MORE.: NEVER TRUE

## 2022-05-31 SDOH — ECONOMIC STABILITY: FOOD INSECURITY: WITHIN THE PAST 12 MONTHS, YOU WORRIED THAT YOUR FOOD WOULD RUN OUT BEFORE YOU GOT MONEY TO BUY MORE.: NEVER TRUE

## 2022-05-31 ASSESSMENT — PATIENT HEALTH QUESTIONNAIRE - PHQ9
6. FEELING BAD ABOUT YOURSELF - OR THAT YOU ARE A FAILURE OR HAVE LET YOURSELF OR YOUR FAMILY DOWN: 0
SUM OF ALL RESPONSES TO PHQ9 QUESTIONS 1 & 2: 1
4. FEELING TIRED OR HAVING LITTLE ENERGY: 0
1. LITTLE INTEREST OR PLEASURE IN DOING THINGS: 0
SUM OF ALL RESPONSES TO PHQ QUESTIONS 1-9: 1
2. FEELING DOWN, DEPRESSED OR HOPELESS: 1
SUM OF ALL RESPONSES TO PHQ QUESTIONS 1-9: 1
5. POOR APPETITE OR OVEREATING: 0
SUM OF ALL RESPONSES TO PHQ QUESTIONS 1-9: 1
10. IF YOU CHECKED OFF ANY PROBLEMS, HOW DIFFICULT HAVE THESE PROBLEMS MADE IT FOR YOU TO DO YOUR WORK, TAKE CARE OF THINGS AT HOME, OR GET ALONG WITH OTHER PEOPLE: 0
SUM OF ALL RESPONSES TO PHQ QUESTIONS 1-9: 1
3. TROUBLE FALLING OR STAYING ASLEEP: 0
8. MOVING OR SPEAKING SO SLOWLY THAT OTHER PEOPLE COULD HAVE NOTICED. OR THE OPPOSITE, BEING SO FIGETY OR RESTLESS THAT YOU HAVE BEEN MOVING AROUND A LOT MORE THAN USUAL: 0
7. TROUBLE CONCENTRATING ON THINGS, SUCH AS READING THE NEWSPAPER OR WATCHING TELEVISION: 0
9. THOUGHTS THAT YOU WOULD BE BETTER OFF DEAD, OR OF HURTING YOURSELF: 0

## 2022-05-31 ASSESSMENT — SOCIAL DETERMINANTS OF HEALTH (SDOH): HOW HARD IS IT FOR YOU TO PAY FOR THE VERY BASICS LIKE FOOD, HOUSING, MEDICAL CARE, AND HEATING?: NOT HARD AT ALL

## 2022-05-31 NOTE — PROGRESS NOTES
General FM note    Jaycee Laurent is a 50 y.o. female who presents today for follow up on her  medical conditions as noted below. Jaycee Laurent is c/o of   Chief Complaint   Patient presents with    Scar     rt armpit irritation       Patient Active Problem List:     ASC-H, +HPV 18 on 12/3/19     Colitis     Celiac disease     Serrated adenoma of colon     Tubular adenoma of colon     Dysplasia of cervix, low grade (GENI 1)     Gastritis without bleeding     Past Medical History:   Diagnosis Date    Abdominal pain     Abnormal Pap smear of cervix     Anxiety     Celiac disease     Hypothyroidism     Wears glasses       Past Surgical History:   Procedure Laterality Date     SECTION  G2, 2007    COLONOSCOPY N/A 7/10/2020    COLONOSCOPY WITH BIOPSY performed by Naomy Zepeda MD at [address] [facility] N/A 2018    XI ROBOTIC LAPAROSCOPIC CHOLECYSTECTOMY, performed by Hari Valdez IV, DO at [address] [facility] N/A 7/10/2020    EGD BIOPSY performed by Naomy Zepeda MD at [facility]  2022    UPPER GASTROINTESTINAL ENDOSCOPY N/A 2022    EGD BIOPSIES performed by Naomy Zepeda MD at [address] [facility]     Family History   Problem Relation Age of Onset    Cancer Father     Other Sister         cerebral hemmorage    Other Maternal Aunt         cerebral hemmorage     Current Outpatient Medications   Medication Sig Dispense Refill    SYNTHROID 125 MCG tablet        No current facility-administered medications for this visit. ALLERGIES:  No Known Allergies    Social History     Tobacco Use    Smoking status: Never Smoker    Smokeless tobacco: Never Used   Substance Use Topics    Alcohol use: Yes     Comment: wine a few times a week      Body mass index is 24.62 kg/m².   /74   Pulse 80   Temp 98.2 °F (36.8 °C)   Wt 139 lb (63 kg)   LMP 2016 (Approximate)   SpO2 97%   BMI 24.62 kg/m²     Subjective:      HPI    50 y.o. female coming in today because of a lesion at her right armpit. Happened 1 time before. The patient states that when she has cyst there is a bump it is tender and very sore. But right now it is gone. There is just a little redness present. The patient has been following up with the mammograms as recommended. She states that she really does not shave at her armpit area. Review of Systems   Constitutional: Negative for fever and unexpected weight change. Pertinent items are noted in HPI. Objective:   Physical Exam  Constitutional: VS (see above). General appearance: normal development, habitus and attention, no deformities. No distress. Eyes: normal conjunctiva and lids. CAV: RRR, no RMG. No edema lower extremities. Pulmo: CTA bilateral, no CWR. Skin: no rashes, lesions or ulcers. Musculoskeletal: normal gait. Nails: no clubbing or cyanosis. Axilla area: Right I did not appreciate any lumps bumps no lymph node swellings. Left normal.  Psychiatric: alert and oriented to place, time and person. Normal mood and affect. Assessment:       Diagnosis Orders   1. Folliculitis     2. Encounter for screening mammogram for malignant neoplasm of breast  [facility] DIGITAL SCREEN W OR WO CAD BILATERAL       Plan:   It appears that the patient has a or had the folliculitis. I discussed with her if this happens again she needs to let me know. She will have the mammogram done as recommended. Return in about 6 months (around 11/30/2022), or if symptoms worsen or fail to improve. Orders Placed This Encounter   Procedures    [facility] DIGITAL SCREEN W OR WO CAD BILATERAL     Standing Status:   Future     Standing Expiration Date:   7/31/2023     Scheduling Instructions:      May perform additional studies at the discretion of the radiologist: Breast US, breast MRI, imaging guided biopsy, cyst aspiration or MBI.      No orders of the defined types were placed in this encounter. Call or return to clinic prn if these symptoms worsen or fail to improve as anticipated. I have reviewed the instructions with the patient, answering all questions to patient's satisfaction. Franco Crane received counseling on the following healthy behaviors: nutrition, exercise, and medication adherence  Reviewed prior labs and health maintenance. Continue current medications, diet and exercise. Discussed use, benefit, and side effects of prescribed medications. Barriers to medication compliance addressed. Patient given educational materials - see patient instructions. All patient questions answered. Patient voiced understanding.       Electronically signed by Tk Niño MD on 6/1/2022 at 6:10 AM       (Please note that portions of this note were completed with a voice recognition program. Efforts were made to edit the dictations but occasionally words are mis-transcribed.)

## 2022-05-31 NOTE — PATIENT INSTRUCTIONS
Patient Education        Preventing Osteoporosis: Care Instructions  Your Care Instructions     Osteoporosis means the bones are weak and thin enough that they can break easily. The older you are, the more likely you are to get osteoporosis. But with plenty of calcium, vitamin D, and exercise, you can help preventosteoporosis. The preteen and teen years are a key time for bone building. With the help of calcium, vitamin D, and exercise in those early years and beyond, the bones reach their peak density and strength by age 27. After age 27, your bonesnaturally start to thin and weaken. The stronger your bones are at around age 27, the lower your risk for osteoporosis. But no matter what your age and risk are, your bones still need calcium, vitamin D, and exercise to stay strong. Also avoid smoking, and limitalcohol. Smoking and heavy alcohol use can make your bones thinner. Talk to your doctor about any special risks you might have, such as having a close relative with osteoporosis or taking a medicine that can weaken bones. Your doctor can tell you the best ways to protect your bones from thinning. Follow-up care is a key part of your treatment and safety. Be sure to make and go to all appointments, and call your doctor if you are having problems. It's also a good idea to know your test results and keep alist of the medicines you take. How can you care for yourself at home? Here are some things you can do to build and strengthen your bones:   Get enough calcium and vitamin D.  ? Eat foods rich in calcium, like yogurt, cheese, milk, and dark green vegetables. ? Eat foods rich in vitamin D, like eggs, fatty fish, cereal, and fortified milk. ? Get some sunshine. Your body uses sunshine to make its own vitamin D.  ? Talk to your doctor about taking a calcium plus vitamin D supplement if you don't think you are getting enough through your diet and sunshine.  Get regular bone-building exercise.  Walking, jogging, dancing, and lifting weights can make your bones stronger.  Limit alcohol to 2 drinks a day for men and 1 drink a day for women.  Don't smoke. Smoking can make bones thin faster. If you need help quitting, talk to your doctor about stop-smoking programs and medicines. These can increase your chances of quitting for good. When should you call for help? Watch closely for changes in your health, and be sure to contact your doctor if you have any problems. Where can you learn more? Go to https://[employer]. org and sign in to your [employer] account. Enter Z882 in the [employer] box to learn more about \"Preventing Osteoporosis: Care Instructions. \"     If you do not have an account, please click on the \"Sign Up Now\" link. Current as of: September 8, 2021               Content Version: 13.2  © 4326-4281 [employer], [employer]orporated. Care instructions adapted under license by [facility] ([facility]). If you have questions about a medical condition or this instruction, always ask your healthcare professional. [address] any warranty or liability for your use of this information.

## 2022-06-21 ENCOUNTER — OFFICE VISIT (OUTPATIENT)
Dept: GASTROENTEROLOGY | Age: 52
End: 2022-06-21
Payer: COMMERCIAL

## 2022-06-21 VITALS
HEART RATE: 75 BPM | HEIGHT: 63 IN | SYSTOLIC BLOOD PRESSURE: 113 MMHG | WEIGHT: 138.2 LBS | BODY MASS INDEX: 24.49 KG/M2 | DIASTOLIC BLOOD PRESSURE: 79 MMHG

## 2022-06-21 DIAGNOSIS — K90.0 CELIAC DISEASE: Primary | ICD-10-CM

## 2022-06-21 PROCEDURE — 99213 OFFICE O/P EST LOW 20 MIN: CPT | Performed by: INTERNAL MEDICINE

## 2022-06-21 ASSESSMENT — ENCOUNTER SYMPTOMS
DIARRHEA: 0
ANAL BLEEDING: 0
SHORTNESS OF BREATH: 0
COUGH: 0
VOICE CHANGE: 0
CHOKING: 0
NAUSEA: 0
CONSTIPATION: 0
BLOOD IN STOOL: 0
WHEEZING: 0
TROUBLE SWALLOWING: 0
ABDOMINAL PAIN: 0
RECTAL PAIN: 0
VOMITING: 0
ABDOMINAL DISTENTION: 0

## 2022-06-21 NOTE — PROGRESS NOTES
GI FOLLOW UP    INTERVAL HISTORY:     Status post upper endoscopy  Duodenal biopsies were negative for any villous blunting  Significant much improved clinically  Maintain gluten-free diet  Pending serological studies for surveillance    Chief Complaint   Patient presents with    Follow-up    EGD       1. Celiac disease          HISTORY OF PRESENT ILLNESS: Ms.Julie SULLY Daniels is a 52 y. o. female with a past history remarkable for a remote history of Graves' disease status post radioactive iodine with medication induced hypothyroidism currently on Synthroid 125 mcg daily, had a subacute symptoms of right-sided flank and right abdominal pain that persisted and prompted a CT abdomen by the primary care physician, results revealed right sided ascending colonic wall thickening and an isolated 1 cm gretchen-mesenteric lymph node, referred for evaluation of of these findings along with her right side abdominal pain.     Patient states that abdominal pain was not associated with any bowel movements or p.o. intake.  Patient has not has not noted any obvious dietary triggers, no prior history of similar presentation, no sick contacts. Alberto Montes De Oca has not had any diarrhea or constipation and has yet to have any upper GI symptoms.  Patient denies any changes in weight or blood in her stool.     Patient celiac studies were strongly consistent with celiac disease.  Her TTG IgA was strongly positive and greater than 128.  Her GDA also strongly positive.  This alongside with her duodenal biopsies favoring moderate severity celiac disease with villous blunting and likely marsh stage of 3. Past Medical,Family, and Social History reviewed and does contribute to the patient presenting condition. Patient's PMH/PSH,SH,PSYCH Hx, MEDs, ALLERGIES, and ROS were all reviewed and updated in the appropriate sections.     PAST MEDICAL HISTORY:  Past Medical History:   Diagnosis Date    Abdominal pain     Abnormal Pap smear of cervix     Anxiety     Celiac disease     Hypothyroidism     Wears glasses        Past Surgical History:   Procedure Laterality Date     SECTION  G2, 2007    COLONOSCOPY N/A 7/10/2020    COLONOSCOPY WITH BIOPSY performed [facility]    AL LAP,CHOLECYSTECTOMY N/A 2018    XI ROBOTIC LAPAROSCOPIC CHOLECYSTECTOMY, performed by Lourdes Valdez IV, DO at [address] [facility] N/A 7/10/2020    EGD BIOPSY performed by Afshin Nick MD at [facility]  2022    UPPER GASTROINTESTINAL ENDOSCOPY N/A 2022    EGD BIOPSIES performed by Afshin Nick MD at [address]:    Current Outpatient Medications:     SYNTHROID 125 MCG tablet, , Disp: , Rfl:     ALLERGIES:   No Known Allergies    FAMILY HISTORY:       Problem Relation Age of Onset    Cancer Father     Other Sister         cerebral hemmorage    Other Maternal Aunt         cerebral hemmorage         SOCIAL HISTORY:   Social History     Socioeconomic History    Marital status:      Spouse name: Not on file    Number of children: Not on file    Years of education: Not on file    Highest education level: Not on file   Occupational History    Not on file   Tobacco Use    Smoking status: Never Smoker    Smokeless tobacco: Never Used   Vaping Use    Vaping Use: Never used   Substance and Sexual Activity    Alcohol use: Yes     Comment: wine a few times a week    Drug use: No    Sexual activity: Yes     Partners: Male   Other Topics Concern    Not on file   Social History Narrative    Not on file     Social Determinants of Health     Financial Resource Strain: Low Risk     Difficulty of Paying Living Expenses: Not hard at all   Food Insecurity: No Food Insecurity    Worried About [address] in the Last Year: Never true    Ran Out of Food in the Last Year: Never true   Transportation Needs:     Lack of Transportation (Medical): Not on file    Lack of Transportation (Non-Medical): Not on file   Physical Activity:     Days of Exercise per Week: Not on file    Minutes of Exercise per Session: Not on file   Stress:     Feeling of Stress : Not on file   Social Connections:     Frequency of Communication with Friends and Family: Not on file    Frequency of Social Gatherings with Friends and Family: Not on file    Attends Sabianism Services: Not on file    Active Member of [address] [place] [address] Mindshapes or Organizations: Not on file    Attends Club or Organization Meetings: Not on file    Marital Status: Not on file   Intimate Partner Violence:     Fear of Current or Ex-Partner: Not on file    Emotionally Abused: Not on file    Physically Abused: Not on file    Sexually Abused: Not on file   Housing Stability:     Unable to Pay for Housing in the Last Year: Not on file    Number of [place] in the Last Year: Not on file    Unstable Housing in the Last Year: Not on file       REVIEW OF SYSTEMS: A 12-point review of systems was obtained and pertinent positives and negatives were listed below. REVIEW OF SYSTEMS:     Constitutional: No fever, no chills, no lethargy, no weakness. HEENT:  No headache, otalgia, itchy eyes, nasal discharge or sore throat. Cardiac:  No chest pain, dyspnea, orthopnea or PND. Chest:   No cough, phlegm or wheezing. Abdomen:      Detailed by MA   Neuro:  No focal weakness, abnormal movements or seizure like activity. Skin:   No rashes, no itching. :   No hematuria, no pyuria, no dysuria, no flank pain. Extremities:  No swelling or joint pains. ROS was otherwise negative    Review of Systems   Constitutional: Negative for appetite change, fatigue and unexpected weight change. HENT: Negative for trouble swallowing and voice change. Eyes: Negative for visual disturbance.    Respiratory: Negative for cough, choking, shortness of breath and wheezing. Cardiovascular: Negative for chest pain, palpitations and leg swelling. Gastrointestinal: Negative for abdominal distention, abdominal pain, anal bleeding, blood in stool, constipation, diarrhea, nausea, rectal pain and vomiting. Genitourinary: Negative for difficulty urinating. Neurological: Negative for dizziness, seizures, weakness, numbness and headaches. Hematological: Does not bruise/bleed easily. Psychiatric/Behavioral: Negative for confusion and sleep disturbance. The patient is not nervous/anxious. PHYSICAL EXAMINATION: Vital signs reviewed per the nursing documentation. /79   Pulse 75   Ht 5' 3\" (1.6 m)   Wt 138 lb 3.2 oz (62.7 kg)   LMP 04/22/2016 (Approximate)   BMI 24.48 kg/m²   Body mass index is 24.48 kg/m². Physical Exam    Physical Exam   Constitutional: Patient is oriented to person, place, and time. Patient appears well-developed and well-nourished. HENT:   Head: Normocephalic and atraumatic. Eyes: Pupils are equal, round, and reactive to light. EOM are normal.   Neck: Normal range of motion. Neck supple. No JVD present. No tracheal deviation present. No thyromegaly present. Cardiovascular: Normal rate, regular rhythm, normal heart sounds and intact distal pulses. Pulmonary/Chest: Effort normal and breath sounds normal. No stridor. No respiratory distress. He has no wheezes. He has no rales. He exhibits no tenderness. Abdominal: Soft. Bowel sounds are normal. He exhibits no distension and no mass. There is no tenderness. There is no rebound and no guarding. No hernia. Musculoskeletal: Normal range of motion. Lymphadenopathy:    Patient has no cervical adenopathy. Neurological: Patient is alert and oriented to person, place, and time. Psychiatric: Patient has a normal mood and affect.  Patient behavior is normal.       LABORATORY DATA: Reviewed  Lab Results   Component Value Date WBC 7.6 06/13/2020    HGB 14.6 06/13/2020    HCT 43.1 06/13/2020    MCV 93.1 06/13/2020     06/13/2020     (L) 02/19/2021    K 4.6 02/19/2021    CL 98 02/19/2021    CO2 27 02/19/2021    BUN 11 02/19/2021    CREATININE 0.70 02/19/2021    LABALBU 4.3 06/13/2020    BILITOT 0.76 06/13/2020    ALKPHOS 78 06/13/2020    AST 35 (H) 06/13/2020    ALT 28 06/13/2020         Lab Results   Component Value Date    RBC 4.63 06/13/2020    HGB 14.6 06/13/2020    MCV 93.1 06/13/2020    MCH 31.5 06/13/2020    MCHC 33.9 06/13/2020    RDW 11.6 (L) 06/13/2020    MPV 9.2 06/13/2020    BASOPCT 1 06/13/2020    LYMPHSABS 2.00 06/13/2020    MONOSABS 0.72 06/13/2020    NEUTROABS 4.42 06/13/2020    EOSABS 0.43 06/13/2020    BASOSABS 0.05 06/13/2020         DIAGNOSTIC TESTING:     No results found. IMPRESSION: Ms.Julie SULLY Daniels is a 51 y. o. female with a past history remarkable for a remote history of Graves' disease status post radioactive iodine with medication induced hypothyroidism currently on Synthroid 125 mcg daily, had a subacute symptoms of right-sided flank and right abdominal pain that persisted and prompted a CT abdomen by the primary care physician, results revealed right sided ascending colonic wall thickening and an isolated 1 cm gretchen-mesenteric lymph node, referred for evaluation of of these findings along with her right side abdominal pain.     Patient states that abdominal pain was not associated with any bowel movements or p.o. intake.  Patient has not has not noted any obvious dietary triggers, no prior history of similar presentation, no sick contacts. Tahira Dinero has not had any diarrhea or constipation and has yet to have any upper GI symptoms.  Patient denies any changes in weight or blood in her stool.     Patient celiac studies were strongly consistent with celiac disease.  Her TTG IgA was strongly positive and greater than 128.  Her GDA also strongly positive.  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  Unclear whether the patient still has unrecovered and celiac disease with minor activity     CT scan initially obtained revealed some colonic wall thickening with some reactive lymphadenopathy identified on colon.  Per radiology read this appears to be likely related to the patient's celiac disease        TTG improving, only mildly elevated but remains elevated and has been elevated for approximately 12 months  Patient currently on [facility] free diet      Assessment  1. Celiac disease        Gabe Andrew was seen today for follow-up and egd. Diagnoses and all orders for this visit:    Celiac disease- status post EGD with negative villous blunting. Normal-appearing duodenal mucosa. Maintaining gluten-free diet. Pending serological studies. Clinically doing well from GI standpoint. Follow-up celiac disease panel.  -     Celiac Disease Panel; Future           RTC: 3 months. Additional comments: Thank you for allowing me to participate in the care of Ms. Rayo Madden. For any further questions please do not hesitate to contact me. I have reviewed and agree with the ROS entered by the MA/LPN from today's encounter documented in a separate note. Singh Cisneros MD, MPH   Board Certified in Gastroenterology  Board Certified in [address] [place] [address] #: [phone]    this note is created with the assistance of a speech recognition program.  While intending to generate a document that actually reflects the content of the visit, the document can still have some errors including those of syntax and sound a like substitutions which may escape proof reading. It such instances, actual meaning can be extrapolated by contextual diversion.

## 2022-07-19 ENCOUNTER — HOSPITAL ENCOUNTER (OUTPATIENT)
Dept: MAMMOGRAPHY | Facility: CLINIC | Age: 52
Discharge: HOME | End: 2022-07-21
Payer: COMMERCIAL

## 2022-07-19 DIAGNOSIS — Z12.31 ENCOUNTER FOR SCREENING MAMMOGRAM FOR MALIGNANT NEOPLASM OF BREAST: ICD-10-CM

## 2022-07-19 PROCEDURE — 77067 SCR MAMMO BI INCL CAD: CPT

## 2022-08-19 ENCOUNTER — HOSPITAL ENCOUNTER (OUTPATIENT)
Age: 52
Discharge: HOME | End: 2022-08-19
Payer: COMMERCIAL

## 2022-08-19 DIAGNOSIS — K90.0 CELIAC DISEASE: ICD-10-CM

## 2022-08-19 PROCEDURE — 82784 ASSAY IGA/IGD/IGG/IGM EACH: CPT

## 2022-08-19 PROCEDURE — 36415 COLL VENOUS BLD VENIPUNCTURE: CPT

## 2022-08-19 PROCEDURE — 83516 IMMUNOASSAY NONANTIBODY: CPT

## 2022-08-20 LAB
GLIADIN DEAMINIDATED PEPTIDE AB IGA: 3.1 U/ML
GLIADIN DEAMINIDATED PEPTIDE AB IGG: 1.5 U/ML
IGA: 116 MG/DL (ref 70–400)
TISSUE TRANSGLUTAMINASE IGA: 8.5 U/ML

## 2022-09-13 ENCOUNTER — OFFICE VISIT (OUTPATIENT)
Dept: OBGYN CLINIC | Age: 52
End: 2022-09-13
Payer: COMMERCIAL

## 2022-09-13 ENCOUNTER — HOSPITAL ENCOUNTER (OUTPATIENT)
Age: 52
Setting detail: SPECIMEN
Discharge: HOME | End: 2022-09-13

## 2022-09-13 VITALS
WEIGHT: 138 LBS | SYSTOLIC BLOOD PRESSURE: 103 MMHG | DIASTOLIC BLOOD PRESSURE: 72 MMHG | HEART RATE: 70 BPM | BODY MASS INDEX: 24.45 KG/M2 | HEIGHT: 63 IN

## 2022-09-13 DIAGNOSIS — Z12.31 SCREENING MAMMOGRAM FOR BREAST CANCER: ICD-10-CM

## 2022-09-13 DIAGNOSIS — Z01.419 WELL WOMAN EXAM WITH ROUTINE GYNECOLOGICAL EXAM: Primary | ICD-10-CM

## 2022-09-13 PROCEDURE — 99396 PREV VISIT EST AGE 40-64: CPT | Performed by: OBSTETRICS & GYNECOLOGY

## 2022-09-13 ASSESSMENT — ENCOUNTER SYMPTOMS
COUGH: 0
SHORTNESS OF BREATH: 0
BACK PAIN: 0
ABDOMINAL PAIN: 0

## 2022-09-13 NOTE — PROGRESS NOTES
[facility] - [address] [facility]  Dept: [phone]    Chief complaint:   Chief Complaint   Patient presents with    Gynecologic Exam     last pap 3/8/21 WNL + HPV Last eduardo 2022 WNL        History Present Illness: Racheal is a 51 yo female who presents for her annual exam.  She denies any complaints. She is doing well with her celiac's and feels it is under control. She is sexually active with her . She denies any vaginal discharge or dyspareunia. She denies any pelvic pain. She denies any bowel or bladder issues. Current Medications (OTC/Herbal):   Current Outpatient Medications   Medication Sig Dispense Refill    SYNTHROID 125 MCG tablet        No current facility-administered medications for this visit.      Allergies: No Known Allergies  Past Medical History:   Past Medical History:   Diagnosis Date    Abdominal pain     Abnormal Pap smear of cervix     Anxiety     Celiac disease     Hypothyroidism     Wears glasses      Past Surgical History:   Past Surgical History:   Procedure Laterality Date     SECTION  G2, 2007    COLONOSCOPY N/A 7/10/2020    COLONOSCOPY WITH BIOPSY performed by Lee Goodpasture, MD at [facility]    ME LAP,CHOLECYSTECTOMY N/A 2018    XI ROBOTIC LAPAROSCOPIC CHOLECYSTECTOMY, performed by Urbano Valdez IV, DO at [address] [place] [address] 7/10/2020    EGD BIOPSY performed by Lee Goodpasture, MD at [place] [address]  2022    UPPER GASTROINTESTINAL ENDOSCOPY N/A 2022    EGD BIOPSIES performed by Lee Goodpasture, MD at [address] [place] [address].     Obstetric History:   2  Para 2  Gynecologic History: LMP 10/31/17  Last Pap: 3/8/21      Any history of abnormal paps yes    PriorColpo/Biopsy GENI 1 on colp     Last Mammogram 22  Result  normal  Contraception: postmenopausal  Complications: none  STDs: none  Psychosocial History: Occupation:   Dental hygienist   Caffeine Yes    At risk for depression No    Abuse:   No  Seatbelt:   Yes  Exercise:  Yes    Social History     Socioeconomic History    Marital status:      Spouse name: Not on file    Number of children: Not on file    Years of education: Not on file    Highest education level: Not on file   Occupational History    Not on file   Tobacco Use    Smoking status: Never    Smokeless tobacco: Never   Vaping Use    Vaping Use: Never used   Substance and Sexual Activity    Alcohol use: Yes     Comment: wine a few times a week    Drug use: No    Sexual activity: Yes     Partners: Male   Other Topics Concern    Not on file   Social History Narrative    Not on file     Social Determinants of Health     Financial Resource Strain: Low Risk     Difficulty of Paying Living Expenses: Not hard at all   Food Insecurity: No Food Insecurity    Worried About Running Out of Food in the Last Year: Never true    Ran Out of Food in the Last Year: Never true   Transportation Needs: Not on file   Physical Activity: Not on file   Stress: Not on file   Social Connections: Not on file   Intimate Partner Violence: Not on file   Housing Stability: Not on file       Family History   Problem Relation Age of Onset    Cancer Father     Other Sister         cerebral hemmorage    Other Maternal Aunt         cerebral hemmorage       Review of Systems:   Review of Systems   Constitutional:  Negative for chills and fever. HENT:  Negative for congestion. Respiratory:  Negative for cough and shortness of breath. Cardiovascular:  Negative for chest pain and palpitations. Gastrointestinal:  Negative for abdominal pain. Genitourinary:  Negative for dyspareunia, pelvic pain and vaginal discharge. Musculoskeletal:  Negative for back pain. Neurological:  Negative for dizziness and light-headedness. Psychiatric/Behavioral:  The patient is not nervous/anxious. Physical exam:  vitals:  Height   5  ft    3 in,  Weight    138 lbs,   103/72 BP  Gen: alert, no apparent distress  HEENT:No pathologic skin lesions noted,NC/AT,PERRL, normal midline nontender thyroid   Lung Exam: Clear to auscultation in all fields bilaterally, without wheezes,rales or rhonchi. Cardiac Exam: Normal sinus rhythm andrate, without murmurs, rubs or gallops appreciated. Breast Exam: Symmetric without pathological skin changes, nontender without discrete suspicious masses palpated, supraclavicular or axillary adenopathy or nipple discharge noted. Abdominal Exam: Nontender to deep palpation without organomegaly, masses or CVAT appreciated, BS positive. No spinal deformation or tenderness. External Genitalia: Normal development without vulvar,vaginal or cervical lesions noted. Normal vaginal discharge, uterus anterior, 4-6 weeks without CMT. Adnexa nontender without abnormal masses bilaterally. Rectal Exam: Omitted. Extremities: Nontender without clubbing, cyanosis or edema. F.R.O.M. Neurologic Exam: Grossly intact without noted sensorimotor deficits and oriented x 3. Assessment/Plan:   Unremarkable annual Gyn exam.    Cervical Cytology Evaluation begins at 24years old. If Negative Cytology, Follow-up screening per current guidelines. Mammograms every 1year. If 37 yo and last mammogram was negative. Hereditary Breast, Ovarian, Colon and Uterine Cancer screening done. Calcium and Vitamin D dosing reviewed. Colonoscopy screening reviewed (2020) as well as onset for bone density testing. Birth control and barrier recommendations discussed. STD counseling and prevention reviewed. Routine health maintenance per patients PCP.   Pt to follow up for annual exam in 1 year    Maria Fernanda Oneill MD  [address] [place] [address]

## 2022-09-15 LAB
HPV SAMPLE: NORMAL
HPV, GENOTYPE 16: NOT DETECTED
HPV, GENOTYPE 18: NOT DETECTED
HPV, HIGH RISK OTHER: NOT DETECTED
HPV, INTERPRETATION: NORMAL
SPECIMEN DESCRIPTION: NORMAL

## 2022-09-20 ENCOUNTER — OFFICE VISIT (OUTPATIENT)
Dept: GASTROENTEROLOGY | Age: 52
End: 2022-09-20
Payer: COMMERCIAL

## 2022-09-20 VITALS
BODY MASS INDEX: 24.31 KG/M2 | SYSTOLIC BLOOD PRESSURE: 108 MMHG | WEIGHT: 137.2 LBS | DIASTOLIC BLOOD PRESSURE: 74 MMHG | HEIGHT: 63 IN

## 2022-09-20 DIAGNOSIS — K90.0 CELIAC DISEASE: Primary | ICD-10-CM

## 2022-09-20 PROCEDURE — 99213 OFFICE O/P EST LOW 20 MIN: CPT | Performed by: INTERNAL MEDICINE

## 2022-09-20 ASSESSMENT — ENCOUNTER SYMPTOMS
NAUSEA: 0
ANAL BLEEDING: 0
DIARRHEA: 0
BLOOD IN STOOL: 0
CHOKING: 0
WHEEZING: 0
CONSTIPATION: 0
RECTAL PAIN: 0
VOMITING: 0
ABDOMINAL PAIN: 0
TROUBLE SWALLOWING: 0
COUGH: 0
ABDOMINAL DISTENTION: 0
VOICE CHANGE: 0
SHORTNESS OF BREATH: 0

## 2022-09-20 NOTE — PROGRESS NOTES
GI FOLLOW UP    INTERVAL HISTORY:     Status post upper endoscopy  Duodenal biopsies were negative for any villous blunting  Significant much improved clinically  Maintain gluten-free diet  Pending serological studies for surveillance    Chief Complaint   Patient presents with    Follow-up     Celiac disease       No diagnosis found. HISTORY OF PRESENT ILLNESS: Anupam Gastelum is a 52 y.o. female with a past history remarkable for a remote history of Graves' disease status post radioactive iodine with medication induced hypothyroidism currently on Synthroid 125 mcg daily, had a subacute symptoms of right-sided flank and right abdominal pain that persisted and prompted a CT abdomen by the primary care physician, results revealed right sided ascending colonic wall thickening and an isolated 1 cm gretchen-mesenteric lymph node, referred for evaluation of of these findings along with her right side abdominal pain. Patient states that abdominal pain was not associated with any bowel movements or p.o. intake. Patient has not has not noted any obvious dietary triggers, no prior history of similar presentation, no sick contacts. Patient has not had any diarrhea or constipation and has yet to have any upper GI symptoms. Patient denies any changes in weight or blood in her stool. Patient celiac studies were strongly consistent with celiac disease. Her TTG IgA was strongly positive and greater than 128. Her GDA also strongly positive. This alongside with her duodenal biopsies favoring moderate severity celiac disease with villous blunting and likely marsh stage of 3. Past Medical,Family, and Social History reviewed and does contribute to the patient presenting condition.     Patient's PMH/PSH,SH,PSYCH Hx, MEDs, ALLERGIES, and ROS were all reviewed and updated in the appropriate sections.     PAST MEDICAL HISTORY:  Past Medical History:   Diagnosis Date    Abdominal pain     Abnormal Pap smear of cervix     Anxiety     Celiac disease     Hypothyroidism     Wears glasses        Past Surgical History:   Procedure Laterality Date     SECTION  G2, 2007    COLONOSCOPY N/A 7/10/2020    COLONOSCOPY WITH BIOPSY performed by Lazarus Sayers, MD at [facility]    SC LAP,CHOLECYSTECTOMY N/A 2018    XI ROBOTIC LAPAROSCOPIC CHOLECYSTECTOMY, performed by Osvaldo Valdez IV, DO at [address] [facility] N/A 7/10/2020    EGD BIOPSY performed by Lazarus Sayers, MD at [address] [facility]  2022    UPPER GASTROINTESTINAL ENDOSCOPY N/A 2022    EGD BIOPSIES performed by Lazarus Sayers, MD at [address]:    Current Outpatient Medications:     SYNTHROID 125 MCG tablet, , Disp: , Rfl:     ALLERGIES:   No Known Allergies    FAMILY HISTORY:       Problem Relation Age of Onset    Cancer Father     Other Sister         cerebral hemmorage    Other Maternal Aunt         cerebral hemmorage         SOCIAL HISTORY:   Social History     Socioeconomic History    Marital status:      Spouse name: Not on file    Number of children: Not on file    Years of education: Not on file    Highest education level: Not on file   Occupational History    Not on file   Tobacco Use    Smoking status: Never    Smokeless tobacco: Never   Vaping Use    Vaping Use: Never used   Substance and Sexual Activity    Alcohol use: Yes     Comment: wine a few times a week    Drug use: No    Sexual activity: Yes     Partners: Male   Other Topics Concern    Not on file   Social History Narrative    Not on file     Social Determinants of Health     Financial Resource Strain: Low Risk     Difficulty of Paying Living Expenses: Not hard at all   Food Insecurity: No Food Insecurity    Worried About [address] in the Last Year: Never true Ran Out of Food in the Last Year: Never true   Transportation Needs: Not on file   Physical Activity: Not on file   Stress: Not on file   Social Connections: Not on file   Intimate Partner Violence: Not on file   Housing Stability: Not on file       REVIEW OF SYSTEMS: A 12-point review of systems was obtained and pertinent positives and negatives were listed below. REVIEW OF SYSTEMS:     Constitutional: No fever, no chills, no lethargy, no weakness. HEENT:  No headache, otalgia, itchy eyes, nasal discharge or sore throat. Cardiac:  No chest pain, dyspnea, orthopnea or PND. Chest:   No cough, phlegm or wheezing. Abdomen:      Detailed by MA   Neuro:  No focal weakness, abnormal movements or seizure like activity. Skin:   No rashes, no itching. :   No hematuria, no pyuria, no dysuria, no flank pain. Extremities:  No swelling or joint pains. ROS was otherwise negative    Review of Systems   Constitutional:  Negative for appetite change, fatigue and unexpected weight change. HENT:  Negative for trouble swallowing and voice change. Eyes:  Negative for visual disturbance. Respiratory:  Negative for cough, choking, shortness of breath and wheezing. Cardiovascular:  Negative for chest pain, palpitations and leg swelling. Gastrointestinal:  Negative for abdominal distention, abdominal pain, anal bleeding, blood in stool, constipation, diarrhea, nausea, rectal pain and vomiting. Genitourinary:  Negative for difficulty urinating. Neurological:  Negative for dizziness, seizures, weakness, numbness and headaches. Hematological:  Does not bruise/bleed easily. Psychiatric/Behavioral:  Negative for confusion and sleep disturbance. The patient is nervous/anxious. PHYSICAL EXAMINATION: Vital signs reviewed per the nursing documentation.      /74 (Site: Right Upper Arm, Position: Sitting)   Ht 5' 3\" (1.6 m)   Wt 137 lb 3.2 oz (62.2 kg)   LMP 04/22/2016 (Approximate)   PF 69 L/min BMI 24.30 kg/m²   Body mass index is 24.3 kg/m². Physical Exam    Physical Exam   Constitutional: Patient is oriented to person, place, and time. Patient appears well-developed and well-nourished. HENT:   Head: Normocephalic and atraumatic. Eyes: Pupils are equal, round, and reactive to light. EOM are normal.   Neck: Normal range of motion. Neck supple. No JVD present. No tracheal deviation present. No thyromegaly present. Cardiovascular: Normal rate, regular rhythm, normal heart sounds and intact distal pulses. Pulmonary/Chest: Effort normal and breath sounds normal. No stridor. No respiratory distress. He has no wheezes. He has no rales. He exhibits no tenderness. Abdominal: Soft. Bowel sounds are normal. He exhibits no distension and no mass. There is no tenderness. There is no rebound and no guarding. No hernia. Musculoskeletal: Normal range of motion. Lymphadenopathy:    Patient has no cervical adenopathy. Neurological: Patient is alert and oriented to person, place, and time. Psychiatric: Patient has a normal mood and affect.  Patient behavior is normal.       LABORATORY DATA: Reviewed  Lab Results   Component Value Date    WBC 7.6 06/13/2020    HGB 14.6 06/13/2020    HCT 43.1 06/13/2020    MCV 93.1 06/13/2020     06/13/2020     (L) 02/19/2021    K 4.6 02/19/2021    CL 98 02/19/2021    CO2 27 02/19/2021    BUN 11 02/19/2021    CREATININE 0.70 02/19/2021    LABALBU 4.3 06/13/2020    BILITOT 0.76 06/13/2020    ALKPHOS 78 06/13/2020    AST 35 (H) 06/13/2020    ALT 28 06/13/2020         Lab Results   Component Value Date    RBC 4.63 06/13/2020    HGB 14.6 06/13/2020    MCV 93.1 06/13/2020    MCH 31.5 06/13/2020    MCHC 33.9 06/13/2020    RDW 11.6 (L) 06/13/2020    MPV 9.2 06/13/2020    BASOPCT 1 06/13/2020    LYMPHSABS 2.00 06/13/2020    MONOSABS 0.72 06/13/2020    NEUTROABS 4.42 06/13/2020    EOSABS 0.43 06/13/2020    BASOSABS 0.05 06/13/2020         DIAGNOSTIC TESTING:     No results found. IMPRESSION: Cm Carbajal is a 54 y.o. female with a past history remarkable for a remote history of Graves' disease status post radioactive iodine with medication induced hypothyroidism currently on Synthroid 125 mcg daily, had a subacute symptoms of right-sided flank and right abdominal pain that persisted and prompted a CT abdomen by the primary care physician, results revealed right sided ascending colonic wall thickening and an isolated 1 cm gretchen-mesenteric lymph node, referred for evaluation of of these findings along with her right side abdominal pain. Patient states that abdominal pain was not associated with any bowel movements or p.o. intake. Patient has not has not noted any obvious dietary triggers, no prior history of similar presentation, no sick contacts. Patient has not had any diarrhea or constipation and has yet to have any upper GI symptoms. Patient denies any changes in weight or blood in her stool. Patient celiac studies were strongly consistent with celiac disease. Her TTG IgA was strongly positive and greater than 128. Her GDA also strongly positive. This alongside with her duodenal biopsies favoring moderate severity celiac disease with villous blunting and likely marsh stage of 3     Currently patient reports some residual right-sided abdominal pain. Unclear whether the patient still has unrecovered and celiac disease with minor activity     CT scan initially obtained revealed some colonic wall thickening with some reactive lymphadenopathy identified on colon. Per radiology read this appears to be likely related to the patient's celiac disease        TTG improving, only mildly elevated but remains elevated and has been elevated for approximately 12 months  Patient currently on [place] free diet      Assessment  No diagnosis found. Aiden Alanis was seen today for follow-up and egd.     Diagnoses and all orders for this visit:    Celiac disease- status post EGD with negative villous blunting. Normal-appearing duodenal mucosa. Maintaining gluten-free diet. Pending serological studies. Clinically doing well from GI standpoint. Follow-up celiac disease panel. TTG much improved however mildly elevated  Will repeat prior to next visit. Patient doing well           RTC: 3 months. Additional comments: Thank you for allowing me to participate in the care of Ms. Yousuf Hernandez. For any further questions please do not hesitate to contact me. I have reviewed and agree with the ROS entered by the MA/LPN from today's encounter documented in a separate note. Byron Lee MD, MPH   Board Certified in Gastroenterology  Board Certified in [address] [place] [address] #: [phone]    this note is created with the assistance of a speech recognition program.  While intending to generate a document that actually reflects the content of the visit, the document can still have some errors including those of syntax and sound a like substitutions which may escape proof reading. It such instances, actual meaning can be extrapolated by contextual diversion.

## 2022-09-22 LAB — CYTOLOGY REPORT: NORMAL

## 2022-12-21 NOTE — PROGRESS NOTES
"12/21/2022       RE: Sakshi Jaeger  [address] [address]     Dear Colleague,    Thank you for referring your patient, Sakshi Jaeger, to the [facility] [place] at [facility]. Please see a copy of my visit note below.      Vascular Surgery Consultation Note     Patient:  Sakshi Jaeger   Date of birth 1943, Medical record number [id number]  Date of Visit:  12/21/2022  Consult Requester:No att. 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 There appears to be a high grade stenosis in the right common iliac artery as well as the distal right external iliac artery/proximal right common femoral artery. I have recommended right femoral artery cut-down with possible endarterectomy and right iliac artery angiogram with possible intervention for atherosclerosis with ulceration, non-healing. I believe we could accomplish the iliac and femoral intervention, and possible AKA under one general anesthetic with a femoral nerve block. I have also communicated with Dr Mott who is fine with Vascular Surgery doing the amputation. The femoral nerve block will need to be placed laterally or serve as a \"one shot\" as we will have a femoral incision and the above knee amputation. The patient has lived with the amputation many years and thus will be planning to return to home afterward - not a TCU or rehab. She will remain on aspirin, not stop it and importantly take it the morning of the planned operation. She had " a chance to ask questions. I look forward to helping her in the new year.    Many thanks for involving me in the care of this very pleasant patient. Should any questions or concerns arise, please don't hesitate to contact me.    Warm Regards,    Emma Oconnor MD, DFSVS, RPVI  Director, [facility] Vascular Services  Professor and Chief, Vascular and Endovascular Surgery  [facility]  Pager: 1. Send message or 10 digit call back number Securely via [employer] with the [employer] Web Console (learn more here)              2. Outside of [facility]? Call [phone]        60 minutes spent on the date of the encounter doing chart review, review of outside records, review of test results, interpretation of tests, patient visit and documentation           HPI: Sakshi is a 78 y/o female with rheumatoid arthritis who uses a motorized scooter at home who underwent a R BKA in the mid-2000s. She developed a pretibial infection and was debrided and underwent a through knee amputation with Dr Mott of Orthopedics in September of this year. Had a small opening at the incision which never healed then developed more breakdown over time. H/o MI. Denies issues with sores on LLE. Has developed significant restless in the right stump recently.      Review of Systems   Constitutional, HEENT, cardiovascular, pulmonary, GI, , musculoskeletal, neuro, skin, endocrine and psych systems are negative, except as otherwise noted.    Physical Exam   GENERAL: Healthy, alert and no distress  EYES: Eyes grossly normal to inspection.  No discharge or erythema, or obvious scleral/conjunctival abnormalities.  RESP: No audible wheeze, cough, or visible cyanosis.  No visible retractions or increased work of breathing.    SKIN: Visible skin clear. No significant rash, abnormal pigmentation or lesions.  NEURO: Cranial nerves grossly intact.  Mentation and speech appropriate for age.  PSYCH: Mentation appears normal, affect normal/bright,  reviewed and updated in the appropriate sections.     PAST MEDICAL HISTORY:  Past Medical History:   Diagnosis Date    Abdominal pain     Hypothyroidism     Nervously anxious     Wears glasses        Past Surgical History:   Procedure Laterality Date     SECTION  G2, 2007    CHOLECYSTECTOMY, LAPAROSCOPIC  2018    robotic    ID LAP,CHOLECYSTECTOMY N/A 2018    XI ROBOTIC LAPAROSCOPIC CHOLECYSTECTOMY, performed by Ella Valdez IV, DO at [address] [place] [address] Avenue:    Current Outpatient Medications:     SYNTHROID 125 MCG tablet, , Disp: , Rfl:     ALLERGIES:   No Known Allergies    FAMILY HISTORY:       Problem Relation Age of Onset    Cancer Father     Other Sister         cerebral hemmorage    Other Maternal Aunt         cerebral hemmorage         SOCIAL HISTORY:   Social History     Socioeconomic History    Marital status:      Spouse name: Not on file    Number of children: Not on file    Years of education: Not on file    Highest education level: Not on file   Occupational History    Not on file   Social Needs    Financial resource strain: Not on file    Food insecurity     Worry: Not on file     Inability: Not on file    Transportation needs     Medical: Not on file     Non-medical: Not on file   Tobacco Use    Smoking status: Never Smoker    Smokeless tobacco: Never Used   Substance and Sexual Activity    Alcohol use: Yes     Comment: wine a few times a week    Drug use: No    Sexual activity: Yes     Partners: Male   Lifestyle    Physical activity     Days per week: Not on file     Minutes per session: Not on file    Stress: Not on file   Relationships    Social connections     Talks on phone: Not on file     Gets together: Not on file     Attends [employer] service: Not on file     Active member of club or organization: Not on file     Attends meetings of clubs or organizations: Not on file     Relationship status: Not on file    Intimate partner judgement and insight intact, normal speech and appearance well-groomed.    Imaging: Aorotiliac and RLE arterial duplex show     Aortoiliac duplex 12/15/22: IMPRESSION:  1. Velocity ratio 5.5 in the proximal right common iliac artery,  compatible with hemodynamically significant stenosis. There was  significant calcified stenosis on CTA in this region on CTA from  10/19/2021.    Femoral Duplex 12/15/2022: Significant signal drop out due to heavy calcifications noted on CT as well with areas of greater than 70% stenosis.      Video Start Time: 330  Video End Time: 430       Media Information  Document Information    Photographic Image  (Mobile Photo)   Right BKA   12/01/2022   Attached To:   [facility]      Source Information    [facility]   Last update on 12/21/2022   Wound Care           Sincerely,    Emma Oconnor MD   diarrheal symptoms but does report a right side abdominal discomfort on deep palpation. 3) RTC in 4 weeks to discuss endoscopic results. Thank you for allowing me to participate in the care of Ms. Gary Cohen. For any further questions please do not hesitate to contact me. I have reviewed and agree with the ROS entered by the MA/LPN from today's encounter documented in a separate note. Vero Bush MD, MPH   [facility]  Office #: [phone]    this note is created with the assistance of a speech recognition program.  While intending to generate a document that actually reflects the content of the visit, the document can still have some errors including those of syntax and sound a like substitutions which may escape proof reading. It such instances, actual meaning can be extrapolated by contextual diversion.

## 2023-01-11 ENCOUNTER — HOSPITAL ENCOUNTER (OUTPATIENT)
Age: 53
Discharge: HOME | End: 2023-01-11
Payer: COMMERCIAL

## 2023-01-11 DIAGNOSIS — K90.0 CELIAC DISEASE: ICD-10-CM

## 2023-01-11 PROCEDURE — 36415 COLL VENOUS BLD VENIPUNCTURE: CPT

## 2023-01-11 PROCEDURE — 83516 IMMUNOASSAY NONANTIBODY: CPT

## 2023-01-12 LAB — TISSUE TRANSGLUTAMINASE IGA: 5.9 U/ML

## 2023-01-18 ENCOUNTER — OFFICE VISIT (OUTPATIENT)
Dept: GASTROENTEROLOGY | Age: 53
End: 2023-01-18
Payer: COMMERCIAL

## 2023-01-18 VITALS
DIASTOLIC BLOOD PRESSURE: 83 MMHG | WEIGHT: 142.8 LBS | SYSTOLIC BLOOD PRESSURE: 123 MMHG | BODY MASS INDEX: 25.3 KG/M2 | HEIGHT: 63 IN

## 2023-01-18 DIAGNOSIS — K90.0 CELIAC DISEASE: Primary | ICD-10-CM

## 2023-01-18 PROCEDURE — 99213 OFFICE O/P EST LOW 20 MIN: CPT | Performed by: INTERNAL MEDICINE

## 2023-01-18 RX ORDER — CALCIUM CARBONATE 500(1250)
500 TABLET ORAL DAILY
COMMUNITY

## 2023-01-18 RX ORDER — IBUPROFEN 200 MG
1 CAPSULE ORAL DAILY
COMMUNITY

## 2023-01-18 RX ORDER — LANOLIN ALCOHOL/MO/W.PET/CERES
1000 CREAM (GRAM) TOPICAL DAILY
COMMUNITY

## 2023-01-18 ASSESSMENT — ENCOUNTER SYMPTOMS
VOMITING: 0
ABDOMINAL PAIN: 0
CHOKING: 0
TROUBLE SWALLOWING: 0
COUGH: 0
WHEEZING: 0
RECTAL PAIN: 0
GASTROINTESTINAL NEGATIVE: 1
NAUSEA: 0
BLOOD IN STOOL: 0
SHORTNESS OF BREATH: 0
ANAL BLEEDING: 0
RESPIRATORY NEGATIVE: 1
DIARRHEA: 0
CONSTIPATION: 0
VOICE CHANGE: 0
ABDOMINAL DISTENTION: 0

## 2023-01-18 NOTE — PROGRESS NOTES
GI FOLLOW UP    INTERVAL HISTORY:     Status post upper endoscopy  Duodenal biopsies were negative for any villous blunting  Significant much improved clinically  Maintain gluten-free diet  Pending serological studies for surveillance    Chief Complaint   Patient presents with    Follow-up     Celiac disease       1. Celiac disease            HISTORY OF PRESENT ILLNESS: Thad Mitchell is a 52 y.o. female with a past history remarkable for a remote history of Graves' disease status post radioactive iodine with medication induced hypothyroidism currently on Synthroid 125 mcg daily, had a subacute symptoms of right-sided flank and right abdominal pain that persisted and prompted a CT abdomen by the primary care physician, results revealed right sided ascending colonic wall thickening and an isolated 1 cm gretchen-mesenteric lymph node, referred for evaluation of of these findings along with her right side abdominal pain. Patient states that abdominal pain was not associated with any bowel movements or p.o. intake. Patient has not has not noted any obvious dietary triggers, no prior history of similar presentation, no sick contacts. Patient has not had any diarrhea or constipation and has yet to have any upper GI symptoms. Patient denies any changes in weight or blood in her stool. Patient celiac studies were strongly consistent with celiac disease. Her TTG IgA was strongly positive and greater than 128. Her GDA also strongly positive. This alongside with her duodenal biopsies favoring moderate severity celiac disease with villous blunting and likely marsh stage of 3. Past Medical,Family, and Social History reviewed and does contribute to the patient presenting condition.     Patient's PMH/PSH,SH,PSYCH Hx, MEDs, ALLERGIES, and ROS were all reviewed and updated in the appropriate sections.    PAST MEDICAL HISTORY:  Past Medical History:   Diagnosis Date    Abdominal pain     Abnormal Pap smear of cervix     Anxiety     Celiac disease     Hypothyroidism     Wears glasses        Past Surgical History:   Procedure Laterality Date     SECTION  G2, 2007    COLONOSCOPY N/A 7/10/2020    COLONOSCOPY WITH BIOPSY performed by Mey Ohara MD at [facility]    IA LAP,CHOLECYSTECTOMY N/A 2018    XI ROBOTIC LAPAROSCOPIC CHOLECYSTECTOMY, performed by Ken Valdez IV, DO at [facility] OR    UPPER GASTROINTESTINAL ENDOSCOPY N/A 7/10/2020    EGD BIOPSY performed by Mey Ohara MD at [facility]    UPPER GASTROINTESTINAL ENDOSCOPY  2022    UPPER GASTROINTESTINAL ENDOSCOPY N/A 2022    EGD BIOPSIES performed by Mey Ohara MD at [facility] OR       CURRENT MEDICATIONS:    Current Outpatient Medications:     vitamin B-12 (CYANOCOBALAMIN) 1000 MCG tablet, Take 1,000 mcg by mouth daily, Disp: , Rfl:     calcium carbonate (OSCAL) 500 MG TABS tablet, Take 500 mg by mouth daily, Disp: , Rfl:     calcium carbonate (OYSTER SHELL CALCIUM 500 MG) 1250 (500 Ca) MG tablet, Take 1 tablet by mouth daily, Disp: , Rfl:     SYNTHROID 125 MCG tablet, , Disp: , Rfl:     ALLERGIES:   No Known Allergies    FAMILY HISTORY:       Problem Relation Age of Onset    Cancer Father     Other Sister         cerebral hemmorage    Other Maternal Aunt         cerebral hemmorage         SOCIAL HISTORY:   Social History     Socioeconomic History    Marital status:      Spouse name: Not on file    Number of children: Not on file    Years of education: Not on file    Highest education level: Not on file   Occupational History    Not on file   Tobacco Use    Smoking status: Never    Smokeless tobacco: Never   Vaping Use    Vaping Use: Never used   Substance and Sexual Activity    Alcohol use: Yes     Comment: wine a few times a week    Drug use: No    Sexual activity: Yes     Partners: Male   Other  Topics Concern    Not on file   Social History Narrative    Not on file     Social Determinants of Health     Financial Resource Strain: Low Risk     Difficulty of Paying Living Expenses: Not hard at all   Food Insecurity: No Food Insecurity    Worried About Running Out of Food in the Last Year: Never true    Ran Out of Food in the Last Year: Never true   Transportation Needs: Not on file   Physical Activity: Not on file   Stress: Not on file   Social Connections: Not on file   Intimate Partner Violence: Not on file   Housing Stability: Not on file       REVIEW OF SYSTEMS: A 12-point review of systems was obtained and pertinent positives and negatives were listed below. REVIEW OF SYSTEMS:     Constitutional: No fever, no chills, no lethargy, no weakness. HEENT:  No headache, otalgia, itchy eyes, nasal discharge or sore throat. Cardiac:  No chest pain, dyspnea, orthopnea or PND. Chest:   No cough, phlegm or wheezing. Abdomen:      Detailed by MA   Neuro:  No focal weakness, abnormal movements or seizure like activity. Skin:   No rashes, no itching. :   No hematuria, no pyuria, no dysuria, no flank pain. Extremities:  No swelling or joint pains. ROS was otherwise negative    Review of Systems   Constitutional:  Positive for unexpected weight change (gained). Negative for appetite change and fatigue. HENT: Negative. Negative for trouble swallowing and voice change. Eyes:  Negative for visual disturbance. Respiratory: Negative. Negative for cough, choking, shortness of breath and wheezing. Cardiovascular: Negative. Negative for chest pain, palpitations and leg swelling. Gastrointestinal: Negative. Negative for abdominal distention, abdominal pain, anal bleeding, blood in stool, constipation, diarrhea, nausea, rectal pain and vomiting. Genitourinary: Negative. Negative for difficulty urinating. Neurological: Negative. Negative for dizziness, seizures, weakness, numbness and headaches. Hematological:  Does not bruise/bleed easily. Psychiatric/Behavioral:  Positive for sleep disturbance. Negative for confusion. The patient is nervous/anxious. PHYSICAL EXAMINATION: Vital signs reviewed per the nursing documentation. /83 (Site: Right Upper Arm, Position: Sitting)   Ht 5' 3\" (1.6 m)   Wt 142 lb 12.8 oz (64.8 kg)   LMP 04/22/2016 (Approximate)   PF 95 L/min   BMI 25.30 kg/m²   Body mass index is 25.3 kg/m². Physical Exam    Physical Exam   Constitutional: Patient is oriented to person, place, and time. Patient appears well-developed and well-nourished. HENT:   Head: Normocephalic and atraumatic. Eyes: Pupils are equal, round, and reactive to light. EOM are normal.   Neck: Normal range of motion. Neck supple. No JVD present. No tracheal deviation present. No thyromegaly present. Cardiovascular: Normal rate, regular rhythm, normal heart sounds and intact distal pulses. Pulmonary/Chest: Effort normal and breath sounds normal. No stridor. No respiratory distress. He has no wheezes. He has no rales. He exhibits no tenderness. Abdominal: Soft. Bowel sounds are normal. He exhibits no distension and no mass. There is no tenderness. There is no rebound and no guarding. No hernia. Musculoskeletal: Normal range of motion. Lymphadenopathy:    Patient has no cervical adenopathy. Neurological: Patient is alert and oriented to person, place, and time. Psychiatric: Patient has a normal mood and affect.  Patient behavior is normal.       LABORATORY DATA: Reviewed  Lab Results   Component Value Date    WBC 7.6 06/13/2020    HGB 14.6 06/13/2020    HCT 43.1 06/13/2020    MCV 93.1 06/13/2020     06/13/2020     (L) 02/19/2021    K 4.6 02/19/2021    CL 98 02/19/2021    CO2 27 02/19/2021    BUN 11 02/19/2021    CREATININE 0.70 02/19/2021    LABALBU 4.3 06/13/2020    BILITOT 0.76 06/13/2020    ALKPHOS 78 06/13/2020    AST 35 (H) 06/13/2020    ALT 28 06/13/2020         Lab Results   Component Value Date    RBC 4.63 06/13/2020    HGB 14.6 06/13/2020    MCV 93.1 06/13/2020    MCH 31.5 06/13/2020    MCHC 33.9 06/13/2020    RDW 11.6 (L) 06/13/2020    MPV 9.2 06/13/2020    BASOPCT 1 06/13/2020    LYMPHSABS 2.00 06/13/2020    MONOSABS 0.72 06/13/2020    NEUTROABS 4.42 06/13/2020    EOSABS 0.43 06/13/2020    BASOSABS 0.05 06/13/2020         DIAGNOSTIC TESTING:     No results found. IMPRESSION: Gaby Faith is a 54 y.o. female with a past history remarkable for a remote history of Graves' disease status post radioactive iodine with medication induced hypothyroidism currently on Synthroid 125 mcg daily, had a subacute symptoms of right-sided flank and right abdominal pain that persisted and prompted a CT abdomen by the primary care physician, results revealed right sided ascending colonic wall thickening and an isolated 1 cm gretchen-mesenteric lymph node, referred for evaluation of of these findings along with her right side abdominal pain. Patient states that abdominal pain was not associated with any bowel movements or p.o. intake. Patient has not has not noted any obvious dietary triggers, no prior history of similar presentation, no sick contacts. Patient has not had any diarrhea or constipation and has yet to have any upper GI symptoms. Patient denies any changes in weight or blood in her stool. Patient celiac studies were strongly consistent with celiac disease. Her TTG IgA was strongly positive and greater than 128. Her GDA also strongly positive. This alongside with her duodenal biopsies favoring moderate severity celiac disease with villous blunting and likely marsh stage of 3     Currently patient reports some residual right-sided abdominal pain.   Unclear whether the patient still has unrecovered and celiac disease with minor activity     CT scan initially obtained revealed some colonic wall thickening with some reactive lymphadenopathy identified on colon.  Per radiology read this appears to be likely related to the patient's celiac disease        TTG improving, only mildly elevated but remains elevated and has been elevated for approximately 12 months  Patient currently on [facility] free diet      Assessment  1. Celiac disease          Palma Crawford was seen today for follow-up and egd. Diagnoses and all orders for this visit:    Celiac disease- status post EGD with negative villous blunting. Normal-appearing duodenal mucosa. Maintaining gluten-free diet. Pending serological studies. Clinically doing well from GI standpoint. Follow-up celiac disease panel. TTG much improved however mildly elevated  Will repeat prior to next visit. Patient doing well      Colonoscopy in July discussion. RTC: 3 months. Additional comments: Thank you for allowing me to participate in the care of Ms. Ward Zaman. For any further questions please do not hesitate to contact me. I have reviewed and agree with the ROS entered by the MA/LPN from today's encounter documented in a separate note. Hiren Figueroa MD, MPH   Board Certified in Gastroenterology  Board Certified in [address] [place] [address] #: [phone]    this note is created with the assistance of a speech recognition program.  While intending to generate a document that actually reflects the content of the visit, the document can still have some errors including those of syntax and sound a like substitutions which may escape proof reading. It such instances, actual meaning can be extrapolated by contextual diversion.

## 2023-05-17 ENCOUNTER — HOSPITAL ENCOUNTER (OUTPATIENT)
Age: 53
Discharge: HOME | End: 2023-05-17
Payer: COMMERCIAL

## 2023-05-17 DIAGNOSIS — K90.0 CELIAC DISEASE: ICD-10-CM

## 2023-05-17 PROCEDURE — 83516 IMMUNOASSAY NONANTIBODY: CPT

## 2023-05-17 PROCEDURE — 36415 COLL VENOUS BLD VENIPUNCTURE: CPT

## 2023-05-18 LAB — TTG IGA SER IA-ACNC: 5.2 U/ML

## 2023-06-20 ENCOUNTER — OFFICE VISIT (OUTPATIENT)
Dept: GASTROENTEROLOGY | Age: 53
End: 2023-06-20
Payer: COMMERCIAL

## 2023-06-20 VITALS
DIASTOLIC BLOOD PRESSURE: 86 MMHG | SYSTOLIC BLOOD PRESSURE: 125 MMHG | HEART RATE: 68 BPM | HEIGHT: 63 IN | WEIGHT: 137.2 LBS | BODY MASS INDEX: 24.31 KG/M2

## 2023-06-20 DIAGNOSIS — K90.0 CELIAC DISEASE: Primary | ICD-10-CM

## 2023-06-20 PROCEDURE — 99213 OFFICE O/P EST LOW 20 MIN: CPT | Performed by: INTERNAL MEDICINE

## 2023-06-20 NOTE — PROGRESS NOTES
radioactive iodine with medication induced hypothyroidism currently on Synthroid 125 mcg daily, had a subacute symptoms of right-sided flank and right abdominal pain that persisted and prompted a CT abdomen by the primary care physician, results revealed right sided ascending colonic wall thickening and an isolated 1 cm gretchen-mesenteric lymph node, referred for evaluation of of these findings along with her right side abdominal pain. Patient states that abdominal pain was not associated with any bowel movements or p.o. intake. Patient has not has not noted any obvious dietary triggers, no prior history of similar presentation, no sick contacts. Patient has not had any diarrhea or constipation and has yet to have any upper GI symptoms. Patient denies any changes in weight or blood in her stool. Patient celiac studies were strongly consistent with celiac disease. Her TTG IgA was strongly positive and greater than 128. Her GDA also strongly positive. This alongside with her duodenal biopsies favoring moderate severity celiac disease with villous blunting and likely marsh stage of 3     Currently patient reports some residual right-sided abdominal pain. Unclear whether the patient still has unrecovered and celiac disease with minor activity     CT scan initially obtained revealed some colonic wall thickening with some reactive lymphadenopathy identified on colon. Per radiology read this appears to be likely related to the patient's celiac disease        TTG improving and now has normalized      Assessment  1. Celiac disease        Yue Olvera was seen today for celiac disease. Diagnoses and all orders for this visit:    Celiac disease-TTG has now normalized, remains on gluten-free diet. No extraintestinal gestation. Clinically doing well from GI standpoint remains asymptomatic. Follow-up in approximately 8 months, repeat serological markers prior to next visit.   -     Tissue Transglutaminase Antobody IgA

## 2023-07-11 NOTE — PROGRESS NOTES
[facility]  [facility] - [address] [facility] [address] [address] [facility]  Dept: [phone]    Chief complaint:   Chief Complaint   Patient presents with    Gynecologic Exam     Last pap 12/3/19  ASC-H + HPV  Last eduardo 19        History Present Illness: Chata Blum is a 53 yo female who presents for her annual exam.  She and her family have all been healthy and safe through [facility]. She denies any GYN complaints. She was diagnosed with Celiac's disease over the summer, but is doing well now. She is sexually active with her  and denies any vaginal discharge or dyspareunia. She denies any pelvic pain. She denies any bowel or bladder issues. Current Medications (OTC/Herbal):   Current Outpatient Medications   Medication Sig Dispense Refill    docusate sodium (COLACE) 100 MG capsule Take 1 capsule by mouth 2 times daily 60 capsule 0    SYNTHROID 125 MCG tablet        No current facility-administered medications for this visit.       Allergies: No Known Allergies  Past Medical History:   Past Medical History:   Diagnosis Date    Abdominal pain     Abnormal Pap smear of cervix     Anxiety     Hypothyroidism     Wears glasses      Past Surgical History:   Past Surgical History:   Procedure Laterality Date     SECTION  G2, 2007    COLONOSCOPY N/A 7/10/2020    COLONOSCOPY WITH BIOPSY performed by Nelson Orona MD at [facility]    IL LAP,CHOLECYSTECTOMY N/A 2018    XI ROBOTIC LAPAROSCOPIC CHOLECYSTECTOMY, performed by Christi Valdez IV, DO at [place] [address] N/A 7/10/2020    EGD BIOPSY performed by Nelson Orona MD at [facility]     Obstetric History:   2  Para 2  Gynecologic History: LMP 10/31/17  Last Pap: 12/3/19       Any history of abnormal paps yes    PriorColpo/Biopsy GENI 1 on colp     Last Mammogram 18  Result  normal  Contraception: postmenopausal  Complications: none  STDs: none  Psychosocial [facility] Care Transitions Follow Up Call    Patient Current Location:  Home: [address] [place] [id number]    Care Transition Nurse contacted the patient by telephone to follow up after admission on 23. Patient: Davi Orantes  Patient : 1965   MRN: [id number]  Reason for Admission: diverticulitis  Discharge Date: 23 RARS: Readmission Risk Score: 10.6      Needs to be reviewed by the provider   Additional needs identified to be addressed with provider: No  none             Method of communication with provider: none.      -Spoke with patient for final care transition call.   -Patient reports she is \"so far so good\"  feels she is slowly getting her strength back and was able to attend the gym x 1. Denies any abdominal pain.  -States she had recent oral surgery for implants and had to be off her coumadin for a period of time. Back on her coumadin as of last Friday(23)tests her INR at home and has been in communication with her PCP regarding result and dosing. Patient states she had to give herself a few \"injections\" when she was off the coumadin.  -Patient denies any needs at this time. -CTN to sign off. Addressed changes since last contact:   had recent oral surgery    Discussed follow-up appointments. If no appointment was previously scheduled, appointment scheduling offered: n/a. Is follow up appointment scheduled within 7 days of discharge? No, discharged on 23 and saw PCP on 23. Follow Up  No future appointments.   Non-[facility] follow up appointment(s): none     Care Transitions Subsequent and Final Call    Subsequent and Final Calls  Do you have any ongoing symptoms?: No  Do you have any questions related to your medications?: No  Do you currently have any active services?: No  Do you have any needs or concerns that I can assist you with?: No  Identified Barriers: None  Care Transitions Interventions  No Identified Needs  Other Interventions:             No further History: Occupation:   Dental hygienist   Caffeine Yes    At risk for depression No    Abuse:   No  Seatbelt:   Yes  Exercise:  Yes    Social History     Socioeconomic History    Marital status:      Spouse name: Not on file    Number of children: Not on file    Years of education: Not on file    Highest education level: Not on file   Occupational History    Not on file   Social Needs    Financial resource strain: Not on file    Food insecurity     Worry: Not on file     Inability: Not on file   [employer] needs     Medical: Not on file     Non-medical: Not on file   Tobacco Use    Smoking status: Never Smoker    Smokeless tobacco: Never Used   Substance and Sexual Activity    Alcohol use: Yes     Comment: wine a few times a week    Drug use: No    Sexual activity: Yes     Partners: Male   Lifestyle    Physical activity     Days per week: Not on file     Minutes per session: Not on file    Stress: Not on file   Relationships    Social connections     Talks on phone: Not on file     Gets together: Not on file     Attends [employer] service: Not on file     Active member of club or organization: Not on file     Attends meetings of clubs or organizations: Not on file     Relationship status: Not on file    Intimate partner violence     Fear of current or ex partner: Not on file     Emotionally abused: Not on file     Physically abused: Not on file     Forced sexual activity: Not on file   Other Topics Concern    Not on file   Social History Narrative    Not on file       Family History   Problem Relation Age of Onset    Cancer Father     Other Sister         cerebral hemmorage    Other Maternal Aunt         cerebral hemmorage       Review of Systems:   Review of Systems   Constitutional: Negative for chills and fever. HENT: Negative for congestion. Respiratory: Negative for cough and shortness of breath. Cardiovascular: Negative for chest pain and palpitations. Gastrointestinal: Negative for abdominal pain. Genitourinary: Negative for vaginal discharge. Musculoskeletal: Negative for back pain. Neurological: Negative for dizziness and light-headedness. Psychiatric/Behavioral: The patient is not nervous/anxious. Physical exam:  vitals:  Height   5  ft    3 in,  Weight    129 lbs,   116/78 BP  Gen: alert, no apparent distress  HEENT:No pathologic skin lesions noted,NC/AT,PERRL, normal midline nontender thyroid   Lung Exam: Clear to auscultation in all fields bilaterally, without wheezes,rales or rhonchi. Cardiac Exam: Normal sinus rhythm andrate, without murmurs, rubs or gallops appreciated. Breast Exam: Symmetric without pathological skin changes, nontender without discrete suspicious masses palpated, supraclavicular or axillary adenopathy or nipple discharge noted. Abdominal Exam: Nontender to deep palpation without organomegaly, masses or CVAT appreciated, BS positive. No spinal deformation or tenderness. External Genitalia: Normal development without vulvar,vaginal or cervical lesions noted. Normal vaginal discharge, uterus anterior, 4-6 weeks without CMT. Adnexa nontender without abnormal masses bilaterally. Rectal Exam: Omitted. Extremities: Nontender without clubbing, cyanosis or edema. F.R.O.M. Neurologic Exam: Grossly intact without noted sensorimotor deficits and oriented x 3. Assessment/Plan:   Unremarkable annual Gyn exam.    Cervical Cytology Evaluation begins at 24years old. If Negative Cytology, Follow-up screening per current guidelines. Mammograms every 1year. If 35 yo and last mammogram was negative. Calcium and Vitamin D dosing reviewed. Colonoscopy screening reviewed (2020) as well as onset for bone density testing. Birth control and barrier recommendations discussed. STD counseling and prevention reviewed. Routine health maintenance per patients PCP.   Pt to follow up for annual exam in 1 year    Kiko Gilbert MD  [address] [place] [address]

## 2023-09-13 ENCOUNTER — HOSPITAL ENCOUNTER (OUTPATIENT)
Age: 53
Setting detail: SPECIMEN
Discharge: HOME | End: 2023-09-13

## 2023-09-13 ENCOUNTER — OFFICE VISIT (OUTPATIENT)
Dept: OBGYN CLINIC | Age: 53
End: 2023-09-13
Payer: COMMERCIAL

## 2023-09-13 VITALS
HEART RATE: 80 BPM | WEIGHT: 138 LBS | HEIGHT: 63 IN | BODY MASS INDEX: 24.45 KG/M2 | DIASTOLIC BLOOD PRESSURE: 75 MMHG | SYSTOLIC BLOOD PRESSURE: 113 MMHG

## 2023-09-13 DIAGNOSIS — Z01.419 WOMEN'S ANNUAL ROUTINE GYNECOLOGICAL EXAMINATION: Primary | ICD-10-CM

## 2023-09-13 DIAGNOSIS — Z78.0 POSTMENOPAUSAL: ICD-10-CM

## 2023-09-13 PROCEDURE — 99396 PREV VISIT EST AGE 40-64: CPT | Performed by: OBSTETRICS & GYNECOLOGY

## 2023-09-13 NOTE — PROGRESS NOTES
[address] [facility]  [facility] - [address] [facility] [place]  Dept: [phone]    Chief complaint:   Chief Complaint   Patient presents with    Gynecologic Exam     Last pap 22 WNL - HPV Last eduardo 22        History Present Illness: Nova Wolfe is a 47 yo female who presents for her annual exam. She denies any GYN complaints. She says her Celiac's is under control and doing well. She is sexually active with her  and does have some dryness. She denies any dyspareunia. She denies any vaginal discharge or irritation. She denies any issues with bladder or bowel. Current Medications (OTC/Herbal):   Current Outpatient Medications   Medication Sig Dispense Refill    CALCIUM-VITAMIN D PO Take by mouth      SYNTHROID 125 MCG tablet       vitamin B-12 (CYANOCOBALAMIN) 1000 MCG tablet Take 1,000 mcg by mouth daily (Patient not taking: Reported on 2023)       No current facility-administered medications for this visit.      Allergies: No Known Allergies  Past Medical History:   Past Medical History:   Diagnosis Date    Abdominal pain     Abnormal Pap smear of cervix     Anxiety     Autoimmune disorder ([address] [facility]) Celiac Disease    2020    Celiac disease     Hypothyroidism     Wears glasses      Past Surgical History:   Past Surgical History:   Procedure Laterality Date     SECTION  G2, 2007    COLONOSCOPY N/A 7/10/2020    COLONOSCOPY WITH BIOPSY performed by Fannie Raza MD at [address] [facility] N/A 2018    XI ROBOTIC LAPAROSCOPIC CHOLECYSTECTOMY, performed by Amaury Valdez IV, DO at [address] 7/10/2020    EGD BIOPSY performed by Fannie Raza MD at [address] [facility]  2022    UPPER GASTROINTESTINAL ENDOSCOPY N/A 2022    EGD BIOPSIES performed by Fannie Raza MD at [address] [facility]

## 2023-09-15 LAB
HPV I/H RISK 4 DNA CVX QL NAA+PROBE: NOT DETECTED
HPV SAMPLE: NORMAL
HPV, INTERPRETATION: NORMAL
HPV16 DNA CVX QL NAA+PROBE: NOT DETECTED
HPV18 DNA CVX QL NAA+PROBE: NOT DETECTED
SPECIMEN DESCRIPTION: NORMAL

## 2023-09-21 LAB — CYTOLOGY REPORT: NORMAL

## 2023-10-18 ENCOUNTER — HOSPITAL ENCOUNTER (OUTPATIENT)
Dept: MAMMOGRAPHY | Age: 53
Discharge: HOME | End: 2023-10-20
Attending: OBSTETRICS & GYNECOLOGY
Payer: COMMERCIAL

## 2023-10-18 DIAGNOSIS — Z12.31 ENCOUNTER FOR SCREENING MAMMOGRAM FOR BREAST CANCER: ICD-10-CM

## 2023-10-18 DIAGNOSIS — Z78.0 POSTMENOPAUSAL: ICD-10-CM

## 2023-10-18 PROCEDURE — 77080 DXA BONE DENSITY AXIAL: CPT

## 2023-10-18 PROCEDURE — 77063 BREAST TOMOSYNTHESIS BI: CPT

## 2023-10-29 SDOH — ECONOMIC STABILITY: HOUSING INSECURITY
IN THE LAST 12 MONTHS, WAS THERE A TIME WHEN YOU DID NOT HAVE A STEADY PLACE TO SLEEP OR SLEPT IN A SHELTER (INCLUDING NOW)?: NO

## 2023-10-29 SDOH — ECONOMIC STABILITY: FOOD INSECURITY: WITHIN THE PAST 12 MONTHS, YOU WORRIED THAT YOUR FOOD WOULD RUN OUT BEFORE YOU GOT MONEY TO BUY MORE.: NEVER TRUE

## 2023-10-29 SDOH — ECONOMIC STABILITY: INCOME INSECURITY: HOW HARD IS IT FOR YOU TO PAY FOR THE VERY BASICS LIKE FOOD, HOUSING, MEDICAL CARE, AND HEATING?: NOT HARD AT ALL

## 2023-10-29 SDOH — ECONOMIC STABILITY: TRANSPORTATION INSECURITY
IN THE PAST 12 MONTHS, HAS LACK OF TRANSPORTATION KEPT YOU FROM MEETINGS, WORK, OR FROM GETTING THINGS NEEDED FOR DAILY LIVING?: NO

## 2023-10-29 ASSESSMENT — PATIENT HEALTH QUESTIONNAIRE - PHQ9
SUM OF ALL RESPONSES TO PHQ QUESTIONS 1-9: 0
2. FEELING DOWN, DEPRESSED OR HOPELESS: NOT AT ALL
SUM OF ALL RESPONSES TO PHQ9 QUESTIONS 1 & 2: 0
SUM OF ALL RESPONSES TO PHQ QUESTIONS 1-9: 0
1. LITTLE INTEREST OR PLEASURE IN DOING THINGS: NOT AT ALL
SUM OF ALL RESPONSES TO PHQ9 QUESTIONS 1 & 2: 0
2. FEELING DOWN, DEPRESSED OR HOPELESS: 0
SUM OF ALL RESPONSES TO PHQ QUESTIONS 1-9: 0
SUM OF ALL RESPONSES TO PHQ QUESTIONS 1-9: 0
1. LITTLE INTEREST OR PLEASURE IN DOING THINGS: 0

## 2023-10-31 NOTE — PROGRESS NOTES
[facility]  [address] [place] [address] [address] [address]  Dept: [phone]  Dept Fax: [phone]    23    Chief Complaint   Patient presents with    Results     Discuss dexa scan results and treatment options       Alpesh Mcgregor 48 y.o. is here to discuss her diagnosis of osteoporosis and different medication options. She takes some calcium with vit D, but not consistently. She is uncertain what she would like to do and wants to discuss. Review of Systems    Gynecologic History  Patient's last menstrual period was 2016.   Contraception: post menopausal status  Last Pap: 23  Results: normal  Last Mammogram: 10/18/23  Results: normal    Obstetric History  : 2  Para: 2  AB: 0    Past Medical History:   Diagnosis Date    Abdominal pain     Abnormal Pap smear of cervix     Anxiety     Autoimmune disorder ([facility]) Celiac Disease    2020    Celiac disease     Hypothyroidism     Wears glasses      Past Surgical History:   Procedure Laterality Date     SECTION  G2,     COLONOSCOPY N/A 7/10/2020    COLONOSCOPY WITH BIOPSY performed by Wilfredo Vegas MD at [address] [facility] N/A 2018    XI ROBOTIC [address] [facility], performed by Felicitas Litten Canos IV, DO at [address] [address] 7/10/2020    EGD BIOPSY performed by Wilfredo Vegas MD at [address] [facility]  2022    UPPER GASTROINTESTINAL ENDOSCOPY N/A 2022    EGD BIOPSIES performed by Wilfredo Vegas MD at [address] [facility]     No Known Allergies  Current Outpatient Medications   Medication Sig Dispense Refill    CALCIUM-VITAMIN D PO Take by mouth      vitamin B-12 (CYANOCOBALAMIN) 1000 MCG tablet Take 1,000 mcg by mouth daily (Patient not taking: Reported on 2023)      SYNTHROID 125 MCG tablet        No current

## 2023-11-01 ENCOUNTER — HOSPITAL ENCOUNTER (OUTPATIENT)
Age: 53
Setting detail: SPECIMEN
Discharge: HOME | End: 2023-11-01

## 2023-11-01 ENCOUNTER — OFFICE VISIT (OUTPATIENT)
Dept: OBGYN CLINIC | Age: 53
End: 2023-11-01
Payer: COMMERCIAL

## 2023-11-01 VITALS
HEART RATE: 72 BPM | BODY MASS INDEX: 24.45 KG/M2 | SYSTOLIC BLOOD PRESSURE: 122 MMHG | DIASTOLIC BLOOD PRESSURE: 86 MMHG | HEIGHT: 63 IN | WEIGHT: 138 LBS

## 2023-11-01 DIAGNOSIS — M81.0 OSTEOPOROSIS, UNSPECIFIED OSTEOPOROSIS TYPE, UNSPECIFIED PATHOLOGICAL FRACTURE PRESENCE: Primary | ICD-10-CM

## 2023-11-01 DIAGNOSIS — M81.0 OSTEOPOROSIS, UNSPECIFIED OSTEOPOROSIS TYPE, UNSPECIFIED PATHOLOGICAL FRACTURE PRESENCE: ICD-10-CM

## 2023-11-01 DIAGNOSIS — K90.0 CELIAC DISEASE: ICD-10-CM

## 2023-11-01 LAB
25(OH)D3 SERPL-MCNC: 38.8 NG/ML (ref 30–100)
ANION GAP SERPL CALCULATED.3IONS-SCNC: 13 MMOL/L (ref 9–16)
BUN SERPL-MCNC: 9 MG/DL (ref 6–20)
CALCIUM SERPL-MCNC: 9.9 MG/DL (ref 8.6–10.4)
CHLORIDE SERPL-SCNC: 96 MMOL/L (ref 98–107)
CO2 SERPL-SCNC: 25 MMOL/L (ref 20–31)
CREAT SERPL-MCNC: 0.7 MG/DL (ref 0.5–0.9)
GFR SERPL CREATININE-BSD FRML MDRD: >60 ML/MIN/1.73M2
GLUCOSE SERPL-MCNC: 69 MG/DL (ref 74–99)
POTASSIUM SERPL-SCNC: 4.3 MMOL/L (ref 3.7–5.3)
SODIUM SERPL-SCNC: 134 MMOL/L (ref 136–145)

## 2023-11-01 PROCEDURE — 99213 OFFICE O/P EST LOW 20 MIN: CPT | Performed by: OBSTETRICS & GYNECOLOGY

## 2024-03-23 ASSESSMENT — PATIENT HEALTH QUESTIONNAIRE - PHQ9
2. FEELING DOWN, DEPRESSED OR HOPELESS: NOT AT ALL
2. FEELING DOWN, DEPRESSED OR HOPELESS: NOT AT ALL
SUM OF ALL RESPONSES TO PHQ QUESTIONS 1-9: 0
SUM OF ALL RESPONSES TO PHQ9 QUESTIONS 1 & 2: 0
SUM OF ALL RESPONSES TO PHQ QUESTIONS 1-9: 0
SUM OF ALL RESPONSES TO PHQ QUESTIONS 1-9: 0
1. LITTLE INTEREST OR PLEASURE IN DOING THINGS: NOT AT ALL
SUM OF ALL RESPONSES TO PHQ9 QUESTIONS 1 & 2: 0
1. LITTLE INTEREST OR PLEASURE IN DOING THINGS: NOT AT ALL
SUM OF ALL RESPONSES TO PHQ QUESTIONS 1-9: 0

## 2024-03-26 ENCOUNTER — OFFICE VISIT (OUTPATIENT)
Dept: FAMILY MEDICINE CLINIC | Age: 54
End: 2024-03-26
Payer: COMMERCIAL

## 2024-03-26 ENCOUNTER — HOSPITAL ENCOUNTER (OUTPATIENT)
Age: 54
Setting detail: SPECIMEN
Discharge: HOME | End: 2024-03-26

## 2024-03-26 VITALS
BODY MASS INDEX: 24.41 KG/M2 | HEART RATE: 77 BPM | SYSTOLIC BLOOD PRESSURE: 113 MMHG | HEIGHT: 63 IN | DIASTOLIC BLOOD PRESSURE: 75 MMHG | TEMPERATURE: 97.9 F | WEIGHT: 137.8 LBS | OXYGEN SATURATION: 98 %

## 2024-03-26 DIAGNOSIS — Z23 NEED FOR PROPHYLACTIC VACCINATION AND INOCULATION AGAINST VARICELLA: ICD-10-CM

## 2024-03-26 DIAGNOSIS — Z71.89 ACP (ADVANCE CARE PLANNING): ICD-10-CM

## 2024-03-26 DIAGNOSIS — Z13.6 ENCOUNTER FOR LIPID SCREENING FOR CARDIOVASCULAR DISEASE: ICD-10-CM

## 2024-03-26 DIAGNOSIS — Z13.220 ENCOUNTER FOR LIPID SCREENING FOR CARDIOVASCULAR DISEASE: ICD-10-CM

## 2024-03-26 DIAGNOSIS — Z12.83 SKIN CANCER SCREENING: ICD-10-CM

## 2024-03-26 DIAGNOSIS — Z13.1 DIABETES MELLITUS SCREENING: ICD-10-CM

## 2024-03-26 DIAGNOSIS — Z00.00 ENCOUNTER FOR WELL ADULT EXAM WITHOUT ABNORMAL FINDINGS: Primary | ICD-10-CM

## 2024-03-26 LAB
ALBUMIN SERPL-MCNC: 4.8 G/DL (ref 3.5–5.2)
ALBUMIN/GLOB SERPL: 2 {RATIO} (ref 1–2.5)
ALP SERPL-CCNC: 69 U/L (ref 35–104)
ALT SERPL-CCNC: 27 U/L (ref 10–35)
ANION GAP SERPL CALCULATED.3IONS-SCNC: 10 MMOL/L (ref 9–16)
AST SERPL-CCNC: 36 U/L (ref 10–35)
BASOPHILS # BLD: 0.05 K/UL (ref 0–0.2)
BASOPHILS NFR BLD: 1 % (ref 0–2)
BILIRUB SERPL-MCNC: 0.5 MG/DL (ref 0–1.2)
BILIRUB UR QL STRIP: NEGATIVE
BUN SERPL-MCNC: 11 MG/DL (ref 6–20)
CALCIUM SERPL-MCNC: 9.6 MG/DL (ref 8.6–10.4)
CHLORIDE SERPL-SCNC: 101 MMOL/L (ref 98–107)
CHOLEST SERPL-MCNC: 186 MG/DL (ref 0–199)
CHOLESTEROL/HDL RATIO: 3
CLARITY UR: CLEAR
CO2 SERPL-SCNC: 26 MMOL/L (ref 20–31)
COLOR UR: YELLOW
COMMENT: NORMAL
CREAT SERPL-MCNC: 0.8 MG/DL (ref 0.5–0.9)
EOSINOPHIL # BLD: 0.25 K/UL (ref 0–0.44)
EOSINOPHILS RELATIVE PERCENT: 6 % (ref 1–4)
ERYTHROCYTE [DISTWIDTH] IN BLOOD BY AUTOMATED COUNT: 11.5 % (ref 11.8–14.4)
GFR SERPL CREATININE-BSD FRML MDRD: >90 ML/MIN/1.73M2
GLUCOSE SERPL-MCNC: 77 MG/DL (ref 74–99)
GLUCOSE UR STRIP-MCNC: NEGATIVE MG/DL
HCT VFR BLD AUTO: 40.8 % (ref 36.3–47.1)
HDLC SERPL-MCNC: 68 MG/DL
HGB BLD-MCNC: 14.2 G/DL (ref 11.9–15.1)
HGB UR QL STRIP.AUTO: NEGATIVE
IMM GRANULOCYTES # BLD AUTO: <0.03 K/UL (ref 0–0.3)
IMM GRANULOCYTES NFR BLD: 0 %
KETONES UR STRIP-MCNC: NEGATIVE MG/DL
LDLC SERPL CALC-MCNC: 87 MG/DL (ref 0–100)
LEUKOCYTE ESTERASE UR QL STRIP: NEGATIVE
LYMPHOCYTES NFR BLD: 1.8 K/UL (ref 1.1–3.7)
LYMPHOCYTES RELATIVE PERCENT: 40 % (ref 24–43)
MCH RBC QN AUTO: 32.3 PG (ref 25.2–33.5)
MCHC RBC AUTO-ENTMCNC: 34.8 G/DL (ref 28.4–34.8)
MCV RBC AUTO: 92.9 FL (ref 82.6–102.9)
MONOCYTES NFR BLD: 0.34 K/UL (ref 0.1–1.2)
MONOCYTES NFR BLD: 8 % (ref 3–12)
NEUTROPHILS NFR BLD: 45 % (ref 36–65)
NEUTS SEG NFR BLD: 2.01 K/UL (ref 1.5–8.1)
NITRITE UR QL STRIP: NEGATIVE
NRBC BLD-RTO: 0 PER 100 WBC
PH UR STRIP: 7.5 [PH] (ref 5–8)
PLATELET # BLD AUTO: 337 K/UL (ref 138–453)
PMV BLD AUTO: 9.1 FL (ref 8.1–13.5)
POTASSIUM SERPL-SCNC: 4.4 MMOL/L (ref 3.7–5.3)
PROT SERPL-MCNC: 7.3 G/DL (ref 6.6–8.7)
PROT UR STRIP-MCNC: NEGATIVE MG/DL
RBC # BLD AUTO: 4.39 M/UL (ref 3.95–5.11)
SODIUM SERPL-SCNC: 137 MMOL/L (ref 136–145)
SP GR UR STRIP: 1.01 (ref 1–1.03)
TRIGL SERPL-MCNC: 156 MG/DL
TSH SERPL DL<=0.05 MIU/L-ACNC: 2.45 UIU/ML (ref 0.27–4.2)
UROBILINOGEN UR STRIP-ACNC: NORMAL EU/DL (ref 0–1)
VLDLC SERPL CALC-MCNC: 31 MG/DL
WBC OTHER # BLD: 4.5 K/UL (ref 3.5–11.3)

## 2024-03-26 PROCEDURE — 99396 PREV VISIT EST AGE 40-64: CPT | Performed by: FAMILY MEDICINE

## 2024-03-26 RX ORDER — ZOSTER VACCINE RECOMBINANT, ADJUVANTED 50 MCG/0.5
0.5 KIT INTRAMUSCULAR ONCE
Qty: 1 EACH | Refills: 1 | Status: SHIPPED | OUTPATIENT
Start: 2024-03-26 | End: 2024-03-26

## 2024-03-26 SDOH — ECONOMIC STABILITY: FOOD INSECURITY: WITHIN THE PAST 12 MONTHS, YOU WORRIED THAT YOUR FOOD WOULD RUN OUT BEFORE YOU GOT MONEY TO BUY MORE.: NEVER TRUE

## 2024-03-26 SDOH — ECONOMIC STABILITY: FOOD INSECURITY: WITHIN THE PAST 12 MONTHS, THE FOOD YOU BOUGHT JUST DIDN'T LAST AND YOU DIDN'T HAVE MONEY TO GET MORE.: NEVER TRUE

## 2024-03-26 SDOH — ECONOMIC STABILITY: INCOME INSECURITY: HOW HARD IS IT FOR YOU TO PAY FOR THE VERY BASICS LIKE FOOD, HOUSING, MEDICAL CARE, AND HEATING?: NOT HARD AT ALL

## 2024-03-26 NOTE — PATIENT INSTRUCTIONS
[employer] disclaims any warranty or liability for your use of this information.  Content Version: 9.4.88243; Last Revised: June 20, 2011

## 2024-03-26 NOTE — PROGRESS NOTES
Other Sister         cerebral hemmorage    Other Maternal Aunt         cerebral hemmorage       Social History     Tobacco Use    Smoking status: Never    Smokeless tobacco: Never   Vaping Use    Vaping Use: Never used   Substance Use Topics    Alcohol use: Yes     Alcohol/week: 2.0 standard drinks of alcohol     Types: 2 Glasses of wine per week     Comment: wine a few times a week    Drug use: No       Objective     Vital Signs  /75   Pulse 77   Temp 97.9 °F (36.6 °C)   Ht 1.6 m (5' 2.99\")   Wt 62.5 kg (137 lb 12.8 oz)   LMP 04/08/2016   SpO2 98%   BMI 24.42 kg/m²   Wt Readings from Last 3 Encounters:   03/26/24 62.5 kg (137 lb 12.8 oz)   11/01/23 62.6 kg (138 lb)   09/13/23 62.6 kg (138 lb)       Waist Circumference  There were no vitals filed for this visit.    Physical Exam  HENT:   /75   Pulse 77   Temp 97.9 °F (36.6 °C)   Ht 1.6 m (5' 2.99\")   Wt 62.5 kg (137 lb 12.8 oz)   LMP 04/08/2016   SpO2 98%   BMI 24.42 kg/m²   Constitutional: Alert and oriented.  Well-nourished.  Head: Normocephalic and atraumatic.   Right Ear: External ear normal. TM: no bulging, erythema or fluid seen.  Left Ear: External ear normal. TM: no bulging, erythema or fluid seen.  Nose: Nose normal.   Mouth/Throat: Oropharynx is clear and moist.  Teeth in very good repair.  Eyes: Pupils are equal, round, and reactive to light. Right eye exhibits no discharge. Left eye exhibits no discharge. No scleral icterus.   Neck: Normal range of motion. Neck supple. No JVD present. No tracheal deviation present. No thyromegaly present.   Cardiovascular: Normal rate, regular rhythm, normal heart sounds.   Pulmonary/Chest: Effort normal and breath sounds normal. No respiratory distress.  She has no wheezes.  She has no rales.   Abdominal: Soft. Bowel sounds are normal.  She exhibits no distension and no mass. There is no tenderness. There is no rebound and no guarding.   Musculoskeletal: Normal range of motion.  She exhibits no

## 2024-04-15 PROBLEM — E03.9 ACQUIRED HYPOTHYROIDISM: Status: ACTIVE | Noted: 2023-05-10

## 2024-04-15 PROBLEM — E55.9 VITAMIN D DEFICIENCY: Status: ACTIVE | Noted: 2024-04-15

## 2024-04-15 PROBLEM — E53.9 VITAMIN B DEFICIENCY: Status: ACTIVE | Noted: 2024-04-15

## 2024-04-23 ENCOUNTER — OFFICE VISIT (OUTPATIENT)
Dept: GASTROENTEROLOGY | Age: 54
End: 2024-04-23

## 2024-04-23 ENCOUNTER — TELEPHONE (OUTPATIENT)
Dept: GASTROENTEROLOGY | Age: 54
End: 2024-04-23

## 2024-04-23 VITALS
HEIGHT: 63 IN | DIASTOLIC BLOOD PRESSURE: 77 MMHG | RESPIRATION RATE: 18 BRPM | HEART RATE: 105 BPM | OXYGEN SATURATION: 94 % | TEMPERATURE: 97.9 F | SYSTOLIC BLOOD PRESSURE: 102 MMHG | WEIGHT: 136.4 LBS | BODY MASS INDEX: 24.17 KG/M2

## 2024-04-23 DIAGNOSIS — Z12.11 COLON CANCER SCREENING: Primary | ICD-10-CM

## 2024-04-23 DIAGNOSIS — K90.0 CELIAC DISEASE: Primary | ICD-10-CM

## 2024-04-23 DIAGNOSIS — Z86.010 HISTORY OF COLON POLYPS: ICD-10-CM

## 2024-04-23 RX ORDER — SOD SULF/POT CHLORIDE/MAG SULF 1.479 G
TABLET ORAL
Qty: 24 TABLET | Refills: 0 | Status: SHIPPED | OUTPATIENT
Start: 2024-04-23

## 2024-04-23 ASSESSMENT — ENCOUNTER SYMPTOMS
ANAL BLEEDING: 0
WHEEZING: 0
CHOKING: 0
RECTAL PAIN: 0
ABDOMINAL DISTENTION: 0
CONSTIPATION: 0
ABDOMINAL PAIN: 0
VOICE CHANGE: 0
VOMITING: 0
BLOOD IN STOOL: 0
TROUBLE SWALLOWING: 0
NAUSEA: 0
SORE THROAT: 0
COUGH: 0
DIARRHEA: 0

## 2024-04-23 NOTE — TELEPHONE ENCOUNTER
Procedure scheduled/Dr Blandon  Procedure: Colonoscopy (Diagnostic)  Dx: Hx of colon polyps  Date: Wednesday 06/26/24  Time: 10:30 am/Arrive 9:00 am  Hospital: [facility]; Entrance C  PAT Phone Call: N/A  Bowel Prep instructions given: SuTab Bowel Prep  In office/via phone: in office  Clearance needed: N/A    Pt states she does not take blood thinners. Writer gave pt telephone number for [employer]. Writer instructed pt she will receive a call/text from [phone] for mail-order pharmacy.

## 2024-04-23 NOTE — PROGRESS NOTES
Reason for Referral:       No referring provider defined for this encounter.    Chief Complaint   Patient presents with    Follow-up     Celiac Disease, Hx: Abd Pain           HISTORY OF PRESENT ILLNESS: Ms.Julie SULLY Daniels is a 54 y.o. female with a past history remarkable for Graves' disease status post radioactive iodine with medication induced hypothyroidism, celiac disease, who presents for a follow-up.  The patient is doing clinically well with no recurrence of symptoms.  She is strictly adhering to a gluten-free diet.  She is currently on vitamin D and B12.  She has not started any medications for osteoporosis.      Previous Endoscopies  EGD 7/2020:  Antritis and Scalloping of duodenal mucosa     1) Normal appearing esophagus and GEJ  2) Scattered areas of erythema identified in the antrum to suggest non-specific antritis, s/p cold biopsy to evaluate for H. Pylori  3) Evidence of duodenal scalloping in D2 and D3, s/p cold biopsy toe evaluate for presence of celiac disease and to identify MARSH criteria severity.    Colonoscopy 7/2020:  FAIR PREP     1) No gross evidence of mucosal colitis (hyperemia, erythema, etc) on the right colon. No large lesions or polyps identified. No large masses identified. Normal appearing terminal ileum. Random biopsies obtained of the cecum, ascending colon, hepatic flexure, and proximal transverse colon.  2) Small 5mm polyp in the proximal ascending colon s/p cold biopsy and removal  3) Small 5mm polyp in the mid-transverse colon s/p cold biopsy and removal  4)Small internal hemorrhoids      1.  DUODENAL BIOPSIES:  SMALL INTESTINAL MUCOSA WITH CHRONIC   INFLAMMATION, VILLOUS ATROPHY AND CRYPT HYPERPLASIA; MORPHOLOGIC   FEATURES COMPATIBLE WITH CELIAC DISEASE.     2.  STOMACH BIOPSIES:  MODERATE TO SEVERE CHRONIC ACTIVE GASTRITIS.   NEGATIVE FOR HELICOBACTER PYLORI ON IMMUNOSTAIN.     3.  CECUM BIOPSY:  NORMAL COLONIC MUCOSA.     4.  ASCENDING COLON POLYP BIOPSY: SERRATED

## 2024-04-30 ENCOUNTER — HOSPITAL ENCOUNTER (OUTPATIENT)
Age: 54
Discharge: HOME | End: 2024-04-30
Payer: COMMERCIAL

## 2024-04-30 ENCOUNTER — PATIENT MESSAGE (OUTPATIENT)
Dept: GASTROENTEROLOGY | Age: 54
End: 2024-04-30

## 2024-04-30 DIAGNOSIS — K90.0 CELIAC DISEASE: ICD-10-CM

## 2024-04-30 DIAGNOSIS — Z12.11 COLON CANCER SCREENING: ICD-10-CM

## 2024-04-30 LAB
FOLATE SERPL-MCNC: 6 NG/ML (ref 4.8–24.2)
VIT B12 SERPL-MCNC: 702 PG/ML (ref 232–1245)

## 2024-04-30 PROCEDURE — 84630 ASSAY OF ZINC: CPT

## 2024-04-30 PROCEDURE — 84425 ASSAY OF VITAMIN B-1: CPT

## 2024-04-30 PROCEDURE — 82746 ASSAY OF FOLIC ACID SERUM: CPT

## 2024-04-30 PROCEDURE — 82525 ASSAY OF COPPER: CPT

## 2024-04-30 PROCEDURE — 82607 VITAMIN B-12: CPT

## 2024-04-30 PROCEDURE — 84597 ASSAY OF VITAMIN K: CPT

## 2024-04-30 PROCEDURE — 83516 IMMUNOASSAY NONANTIBODY: CPT

## 2024-04-30 PROCEDURE — 84446 ASSAY OF VITAMIN E: CPT

## 2024-04-30 PROCEDURE — 36415 COLL VENOUS BLD VENIPUNCTURE: CPT

## 2024-04-30 PROCEDURE — 82784 ASSAY IGA/IGD/IGG/IGM EACH: CPT

## 2024-04-30 PROCEDURE — 84590 ASSAY OF VITAMIN A: CPT

## 2024-04-30 RX ORDER — SOD SULF/POT CHLORIDE/MAG SULF 1.479 G
TABLET ORAL
Qty: 24 TABLET | Refills: 0 | Status: SHIPPED | OUTPATIENT
Start: 2024-04-30

## 2024-04-30 NOTE — TELEPHONE ENCOUNTER
Good afternoon, I just sent this Rx to the [facility] per your request. Have a great day

## 2024-05-01 LAB
GLIADIN IGA SER IA-ACNC: 2.4 U/ML
GLIADIN IGG SER IA-ACNC: 1.7 U/ML
IGA SERPL-MCNC: 117 MG/DL (ref 70–400)
MISCELLANEOUS LAB TEST RESULT: NORMAL
TEST NAME: NORMAL
TTG IGA SER IA-ACNC: 4.7 U/ML

## 2024-05-02 LAB
COPPER SERPL-MCNC: 106.8 UG/DL (ref 80–155)
RETINYL PALMITATE: 0.03 MG/L (ref 0–0.1)
VITAMIN A LEVEL: 0.69 MG/L (ref 0.3–1.2)
VITAMIN A, INTERP: NORMAL
ZINC SERPL-MCNC: 70 UG/DL (ref 60–120)

## 2024-05-03 LAB
ALPHA-TOCOPHEROL: 12.8 MG/L (ref 5.5–18)
GAMMA-TOCOPHEROL: 0.9 MG/L (ref 0–6)

## 2024-05-05 LAB — VIT B1 PYROPHOSHATE BLD-SCNC: 87 NMOL/L (ref 70–180)

## 2024-05-06 LAB — PHYTONADIONE SERPL-MCNC: 4.78 NMOL/L (ref 0.22–4.88)

## 2024-06-17 ENCOUNTER — PATIENT MESSAGE (OUTPATIENT)
Dept: GASTROENTEROLOGY | Age: 54
End: 2024-06-17

## 2024-06-18 NOTE — DISCHARGE INSTRUCTIONS

## 2024-06-19 NOTE — TELEPHONE ENCOUNTER
Ok great I have you down for October 15th at 1030am at the [address] office. Have a great day!

## 2024-06-25 ENCOUNTER — ANESTHESIA EVENT (OUTPATIENT)
Dept: OPERATING ROOM | Age: 54
End: 2024-06-25
Payer: COMMERCIAL

## 2024-06-26 ENCOUNTER — ANESTHESIA (OUTPATIENT)
Dept: OPERATING ROOM | Age: 54
End: 2024-06-26
Payer: COMMERCIAL

## 2024-06-26 ENCOUNTER — HOSPITAL ENCOUNTER (OUTPATIENT)
Age: 54
Setting detail: OUTPATIENT SURGERY
Discharge: HOME | End: 2024-06-26
Attending: INTERNAL MEDICINE | Admitting: INTERNAL MEDICINE
Payer: COMMERCIAL

## 2024-06-26 VITALS
RESPIRATION RATE: 9 BRPM | SYSTOLIC BLOOD PRESSURE: 120 MMHG | HEART RATE: 52 BPM | OXYGEN SATURATION: 98 % | DIASTOLIC BLOOD PRESSURE: 89 MMHG | TEMPERATURE: 97.2 F | WEIGHT: 136 LBS | BODY MASS INDEX: 24.1 KG/M2 | HEIGHT: 63 IN

## 2024-06-26 DIAGNOSIS — Z86.010 HISTORY OF COLON POLYPS: ICD-10-CM

## 2024-06-26 PROCEDURE — 88305 TISSUE EXAM BY PATHOLOGIST: CPT

## 2024-06-26 PROCEDURE — 7100000011 HC PHASE II RECOVERY - ADDTL 15 MIN: Performed by: INTERNAL MEDICINE

## 2024-06-26 PROCEDURE — 3700000000 HC ANESTHESIA ATTENDED CARE: Performed by: INTERNAL MEDICINE

## 2024-06-26 PROCEDURE — 45385 COLONOSCOPY W/LESION REMOVAL: CPT | Performed by: INTERNAL MEDICINE

## 2024-06-26 PROCEDURE — 2500000003 HC RX 250 WO HCPCS

## 2024-06-26 PROCEDURE — 6360000002 HC RX W HCPCS

## 2024-06-26 PROCEDURE — 3700000001 HC ADD 15 MINUTES (ANESTHESIA): Performed by: INTERNAL MEDICINE

## 2024-06-26 PROCEDURE — 7100000010 HC PHASE II RECOVERY - FIRST 15 MIN: Performed by: INTERNAL MEDICINE

## 2024-06-26 PROCEDURE — 2709999900 HC NON-CHARGEABLE SUPPLY: Performed by: INTERNAL MEDICINE

## 2024-06-26 PROCEDURE — 3609010400 HC COLONOSCOPY POLYPECTOMY HOT BIOPSY: Performed by: INTERNAL MEDICINE

## 2024-06-26 PROCEDURE — 2580000003 HC RX 258: Performed by: STUDENT IN AN ORGANIZED HEALTH CARE EDUCATION/TRAINING PROGRAM

## 2024-06-26 PROCEDURE — 2580000003 HC RX 258

## 2024-06-26 RX ORDER — PROPOFOL 10 MG/ML
INJECTION, EMULSION INTRAVENOUS CONTINUOUS PRN
Status: DISCONTINUED | OUTPATIENT
Start: 2024-06-26 | End: 2024-06-26 | Stop reason: SDUPTHER

## 2024-06-26 RX ORDER — SODIUM CHLORIDE 9 MG/ML
INJECTION, SOLUTION INTRAVENOUS PRN
Status: DISCONTINUED | OUTPATIENT
Start: 2024-06-26 | End: 2024-06-26 | Stop reason: HOSPADM

## 2024-06-26 RX ORDER — HYDRALAZINE HYDROCHLORIDE 20 MG/ML
10 INJECTION INTRAMUSCULAR; INTRAVENOUS
Status: DISCONTINUED | OUTPATIENT
Start: 2024-06-26 | End: 2024-06-26 | Stop reason: HOSPADM

## 2024-06-26 RX ORDER — MIDAZOLAM HYDROCHLORIDE 2 MG/2ML
2 INJECTION, SOLUTION INTRAMUSCULAR; INTRAVENOUS
Status: DISCONTINUED | OUTPATIENT
Start: 2024-06-26 | End: 2024-06-26 | Stop reason: HOSPADM

## 2024-06-26 RX ORDER — SODIUM CHLORIDE, SODIUM LACTATE, POTASSIUM CHLORIDE, CALCIUM CHLORIDE 600; 310; 30; 20 MG/100ML; MG/100ML; MG/100ML; MG/100ML
INJECTION, SOLUTION INTRAVENOUS CONTINUOUS PRN
Status: DISCONTINUED | OUTPATIENT
Start: 2024-06-26 | End: 2024-06-26 | Stop reason: SDUPTHER

## 2024-06-26 RX ORDER — NALOXONE HYDROCHLORIDE 0.4 MG/ML
INJECTION, SOLUTION INTRAMUSCULAR; INTRAVENOUS; SUBCUTANEOUS PRN
Status: DISCONTINUED | OUTPATIENT
Start: 2024-06-26 | End: 2024-06-26 | Stop reason: HOSPADM

## 2024-06-26 RX ORDER — DIPHENHYDRAMINE HYDROCHLORIDE 50 MG/ML
12.5 INJECTION INTRAMUSCULAR; INTRAVENOUS
Status: DISCONTINUED | OUTPATIENT
Start: 2024-06-26 | End: 2024-06-26 | Stop reason: HOSPADM

## 2024-06-26 RX ORDER — SODIUM CHLORIDE 0.9 % (FLUSH) 0.9 %
5-40 SYRINGE (ML) INJECTION PRN
Status: DISCONTINUED | OUTPATIENT
Start: 2024-06-26 | End: 2024-06-26 | Stop reason: HOSPADM

## 2024-06-26 RX ORDER — MIDAZOLAM HYDROCHLORIDE 1 MG/ML
INJECTION INTRAMUSCULAR; INTRAVENOUS PRN
Status: DISCONTINUED | OUTPATIENT
Start: 2024-06-26 | End: 2024-06-26 | Stop reason: SDUPTHER

## 2024-06-26 RX ORDER — SODIUM CHLORIDE 0.9 % (FLUSH) 0.9 %
5-40 SYRINGE (ML) INJECTION EVERY 12 HOURS SCHEDULED
Status: DISCONTINUED | OUTPATIENT
Start: 2024-06-26 | End: 2024-06-26 | Stop reason: HOSPADM

## 2024-06-26 RX ORDER — ONDANSETRON 2 MG/ML
4 INJECTION INTRAMUSCULAR; INTRAVENOUS
Status: DISCONTINUED | OUTPATIENT
Start: 2024-06-26 | End: 2024-06-26 | Stop reason: HOSPADM

## 2024-06-26 RX ORDER — PROPOFOL 10 MG/ML
INJECTION, EMULSION INTRAVENOUS PRN
Status: DISCONTINUED | OUTPATIENT
Start: 2024-06-26 | End: 2024-06-26 | Stop reason: SDUPTHER

## 2024-06-26 RX ORDER — IPRATROPIUM BROMIDE AND ALBUTEROL SULFATE 2.5; .5 MG/3ML; MG/3ML
1 SOLUTION RESPIRATORY (INHALATION)
Status: DISCONTINUED | OUTPATIENT
Start: 2024-06-26 | End: 2024-06-26 | Stop reason: HOSPADM

## 2024-06-26 RX ORDER — MEPERIDINE HYDROCHLORIDE 50 MG/ML
12.5 INJECTION INTRAMUSCULAR; INTRAVENOUS; SUBCUTANEOUS EVERY 5 MIN PRN
Status: DISCONTINUED | OUTPATIENT
Start: 2024-06-26 | End: 2024-06-26 | Stop reason: HOSPADM

## 2024-06-26 RX ORDER — METOCLOPRAMIDE HYDROCHLORIDE 5 MG/ML
10 INJECTION INTRAMUSCULAR; INTRAVENOUS
Status: DISCONTINUED | OUTPATIENT
Start: 2024-06-26 | End: 2024-06-26 | Stop reason: HOSPADM

## 2024-06-26 RX ORDER — MORPHINE SULFATE 2 MG/ML
2 INJECTION, SOLUTION INTRAMUSCULAR; INTRAVENOUS EVERY 5 MIN PRN
Status: DISCONTINUED | OUTPATIENT
Start: 2024-06-26 | End: 2024-06-26 | Stop reason: HOSPADM

## 2024-06-26 RX ORDER — LIDOCAINE HYDROCHLORIDE 10 MG/ML
INJECTION, SOLUTION EPIDURAL; INFILTRATION; INTRACAUDAL; PERINEURAL PRN
Status: DISCONTINUED | OUTPATIENT
Start: 2024-06-26 | End: 2024-06-26 | Stop reason: SDUPTHER

## 2024-06-26 RX ORDER — SODIUM CHLORIDE, SODIUM LACTATE, POTASSIUM CHLORIDE, CALCIUM CHLORIDE 600; 310; 30; 20 MG/100ML; MG/100ML; MG/100ML; MG/100ML
INJECTION, SOLUTION INTRAVENOUS CONTINUOUS
Status: DISCONTINUED | OUTPATIENT
Start: 2024-06-26 | End: 2024-06-26 | Stop reason: HOSPADM

## 2024-06-26 RX ORDER — LABETALOL HYDROCHLORIDE 5 MG/ML
10 INJECTION, SOLUTION INTRAVENOUS
Status: DISCONTINUED | OUTPATIENT
Start: 2024-06-26 | End: 2024-06-26 | Stop reason: HOSPADM

## 2024-06-26 RX ORDER — LIDOCAINE HYDROCHLORIDE 10 MG/ML
1 INJECTION, SOLUTION EPIDURAL; INFILTRATION; INTRACAUDAL; PERINEURAL
Status: DISCONTINUED | OUTPATIENT
Start: 2024-06-26 | End: 2024-06-26 | Stop reason: HOSPADM

## 2024-06-26 RX ADMIN — PROPOFOL 125 MCG/KG/MIN: 10 INJECTION, EMULSION INTRAVENOUS at 10:51

## 2024-06-26 RX ADMIN — MIDAZOLAM 1 MG: 1 INJECTION INTRAMUSCULAR; INTRAVENOUS at 10:48

## 2024-06-26 RX ADMIN — SODIUM CHLORIDE, POTASSIUM CHLORIDE, SODIUM LACTATE AND CALCIUM CHLORIDE: 600; 310; 30; 20 INJECTION, SOLUTION INTRAVENOUS at 10:47

## 2024-06-26 RX ADMIN — MIDAZOLAM 1 MG: 1 INJECTION INTRAMUSCULAR; INTRAVENOUS at 10:47

## 2024-06-26 RX ADMIN — PROPOFOL 100 MG: 10 INJECTION, EMULSION INTRAVENOUS at 10:51

## 2024-06-26 RX ADMIN — SODIUM CHLORIDE, POTASSIUM CHLORIDE, SODIUM LACTATE AND CALCIUM CHLORIDE: 600; 310; 30; 20 INJECTION, SOLUTION INTRAVENOUS at 09:38

## 2024-06-26 RX ADMIN — PROPOFOL 30 MG: 10 INJECTION, EMULSION INTRAVENOUS at 11:06

## 2024-06-26 RX ADMIN — LIDOCAINE HYDROCHLORIDE 100 MG: 10 INJECTION, SOLUTION EPIDURAL; INFILTRATION; INTRACAUDAL; PERINEURAL at 10:49

## 2024-06-26 ASSESSMENT — PAIN - FUNCTIONAL ASSESSMENT
PAIN_FUNCTIONAL_ASSESSMENT: NONE - DENIES PAIN
PAIN_FUNCTIONAL_ASSESSMENT: NONE - DENIES PAIN

## 2024-06-26 NOTE — ANESTHESIA POSTPROCEDURE EVALUATION
Department of Anesthesiology  Postprocedure Note    Patient: Halley Daniels  MRN: [id number]  YOB: 1970  Date of evaluation: 6/26/2024    Procedure Summary       Date: 06/26/24 Room / Location: [facility] PROCEDURE ROOM / [facility]    Anesthesia Start: 1047 Anesthesia Stop: 1115    Procedure: COLONOSCOPY POLYPECTOMY HOT BIOPSY Diagnosis:       History of colon polyps      (History of colon polyps [Z86.010])    Surgeons: Jorge L Blandon MD Responsible Provider: Mariposa Mcrae MD    Anesthesia Type: MAC ASA Status: 2            Anesthesia Type: No value filed.    Anastasia Phase I: Anastasia Score: 10    Anastasia Phase II: Anastasia Score: 9    Anesthesia Post Evaluation    Patient location during evaluation: PACU  Patient participation: complete - patient participated  Level of consciousness: awake and alert  Airway patency: patent  Nausea & Vomiting: no vomiting and no nausea  Cardiovascular status: hemodynamically stable  Respiratory status: acceptable, room air and spontaneous ventilation  Hydration status: stable  Multimodal analgesia pain management approach  Pain management: adequate    No notable events documented.

## 2024-06-26 NOTE — OP NOTE
Colonoscopy report    Colonoscopy Procedure Note    Procedure:  Colonoscopy with polypectomy with snare    Procedure Date: 6/26/2024    Indications:  History of polyps    Sedation:  MAC    Attending Physician:  Dr. Jorge L Blandon MD.     Assistant Physician: None    Consent:   Informed consent was obtained for the procedure after explaining the risks including bleeding, perforation, aspiration, arrhythmia, risks related to sedation, reaction to medications and rarely death, benefits and alternatives to the patient. The patient verbalized understanding and agreed to proceed with the procedure.       Procedure Details:  The patient was placed in the left lateral decubitus position.  Oxygen and cardiac monitoring equipment was attached. The patient's vital signs were monitored continuously  throughout the procedure. After appropriate sedation was achieved, a rectal examination was performed.  The pediatric colonoscope was inserted into the rectum and advanced under direct vision to the cecum, which was identified by the ileocecal valve and appendiceal orifice.  The quality of the colonic preparation was good.  A careful inspection was made as the colonoscope was withdrawn, including a retroflexed view of the rectum; findings and interventions are described below.  Appropriate photodocumentation was obtained.           Complications:  None    Estimated blood loss:  Minimal           Disposition:  Home           Condition: stable    Withdrawal Time: 13 minutes    Findings:   The bowel prep was good.  6 to 7 mm flat polyp noted in the cecum that was resected with cold snare.  A 1 cm flat polyp was noted in the ascending colon that was removed with a combination of cold and hot snare.  6 to 7 mm flat polyp noted in the transverse colon that was removed with cold snare.  Retroflexion examination in the rectum with moderate internal/external hemorrhoids.    Specimen Removed: Colon polyps    Endoscopic Impression:    Good  bowel prep.  A total of 3 polyps ranging 6 to 10 mm (1 cecum, 1 ascending colon, 1 transverse colon) removed with a combination of cold and hot snare.  Moderate internal/external hemorrhoids    Recommendations:  Follow pathology results.  Repeat colonoscopy for screening/surveillance in 3 years.    Attending Attestation: I performed the procedure.    Jorge L Blandon MD

## 2024-06-26 NOTE — H&P
Procedure History and Physical    Pre-Procedural Diagnosis:  History of polyps    Indications:  same    Procedure Planned: colonoscopy     History Obtained From:  patient    HISTORY OF PRESENT ILLNESS:       The patient is a 54 y.o. female who presents for the above procedure.        Past Medical History:    Past Medical History:   Diagnosis Date    Abdominal pain     Abnormal Pap smear of cervix     Anxiety     Autoimmune disorder (HCC) Celiac Disease    2020    Celiac disease     Hypothyroidism     Wears glasses        Past Surgical History:    Past Surgical History:   Procedure Laterality Date     SECTION  G2, 2007    CHOLECYSTECTOMY      COLONOSCOPY N/A 07/10/2020    COLONOSCOPY WITH BIOPSY performed by Mey Ohara MD at [facility]    OK LAPAROSCOPY SURG CHOLECYSTECTOMY N/A 2018    XI ROBOTIC LAPAROSCOPIC CHOLECYSTECTOMY, performed by Ken Valdez IV, DO at [facility] OR    UPPER GASTROINTESTINAL ENDOSCOPY N/A 07/10/2020    EGD BIOPSY performed by Mey Ohara MD at [facility]    UPPER GASTROINTESTINAL ENDOSCOPY  2022    UPPER GASTROINTESTINAL ENDOSCOPY N/A 2022    EGD BIOPSIES performed by Mey Ohara MD at [facility] OR       Medications:  No current facility-administered medications for this encounter.     Current Outpatient Medications   Medication Sig Dispense Refill    Sodium Sulfate-Mag Sulfate-KCl (SUTAB) 0334-690-804 MG TABS Take as directed for bowel prep. 24 tablet 0    CALCIUM-VITAMIN D PO Take by mouth      vitamin B-12 (CYANOCOBALAMIN) 1000 MCG tablet Take 1 tablet by mouth daily      SYNTHROID 125 MCG tablet          Allergies:   No Known Allergies              Social   Social History     Tobacco Use    Smoking status: Never    Smokeless tobacco: Never   Substance Use Topics    Alcohol use: Yes     Alcohol/week: 2.0 standard drinks of alcohol     Types: 2 Glasses of wine per week     Comment: wine a few times a week        Family

## 2024-06-26 NOTE — ANESTHESIA PRE PROCEDURE
Anesthesia Plan      MAC     ASA 2       Induction: intravenous.      Anesthetic plan and risks discussed with patient.      Plan discussed with CRNA.                Mariposa Mcrae MD   6/26/2024

## 2024-06-27 LAB — SURGICAL PATHOLOGY REPORT: NORMAL

## 2024-07-30 ENCOUNTER — TELEPHONE (OUTPATIENT)
Dept: GASTROENTEROLOGY | Age: 54
End: 2024-07-30

## 2024-07-30 NOTE — TELEPHONE ENCOUNTER
Cinda from [facility] is calling about a member appeal for date of service 04-. Cinda c/b is [phone]

## 2024-08-01 NOTE — TELEPHONE ENCOUNTER
Return call back to Cinda and referred to billing as the appeal was on a billing issue. Cinda thanked writer. Please advise.

## 2024-08-01 NOTE — TELEPHONE ENCOUNTER
Cinda from [facility] left voice asking for a call back from [place] regarding appeal for service on 4/23/24. The appeal is due tomorrow. Please call Cinda [phone].

## 2024-10-15 ENCOUNTER — OFFICE VISIT (OUTPATIENT)
Dept: GASTROENTEROLOGY | Age: 54
End: 2024-10-15
Payer: COMMERCIAL

## 2024-10-15 VITALS
RESPIRATION RATE: 19 BRPM | SYSTOLIC BLOOD PRESSURE: 128 MMHG | DIASTOLIC BLOOD PRESSURE: 82 MMHG | BODY MASS INDEX: 24.4 KG/M2 | HEART RATE: 66 BPM | HEIGHT: 63 IN | WEIGHT: 137.69 LBS | TEMPERATURE: 97.5 F

## 2024-10-15 DIAGNOSIS — K64.9 HEMORRHOIDS, UNSPECIFIED HEMORRHOID TYPE: ICD-10-CM

## 2024-10-15 DIAGNOSIS — K63.5 POLYP OF COLON, UNSPECIFIED PART OF COLON, UNSPECIFIED TYPE: ICD-10-CM

## 2024-10-15 DIAGNOSIS — K90.0 CELIAC DISEASE: Primary | ICD-10-CM

## 2024-10-15 PROCEDURE — 99214 OFFICE O/P EST MOD 30 MIN: CPT | Performed by: INTERNAL MEDICINE

## 2024-10-15 PROCEDURE — G2211 COMPLEX E/M VISIT ADD ON: HCPCS | Performed by: INTERNAL MEDICINE

## 2024-10-15 RX ORDER — HYDROCORTISONE ACETATE 25 MG/1
25 SUPPOSITORY RECTAL 2 TIMES DAILY
Qty: 20 SUPPOSITORY | Refills: 0 | Status: SHIPPED | OUTPATIENT
Start: 2024-10-15 | End: 2024-10-25

## 2024-10-15 ASSESSMENT — ENCOUNTER SYMPTOMS
ABDOMINAL PAIN: 0
VOICE CHANGE: 0
DIARRHEA: 0
BLOOD IN STOOL: 0
ABDOMINAL DISTENTION: 0
VOMITING: 0
WHEEZING: 0
CONSTIPATION: 0
RECTAL PAIN: 0
COUGH: 0
TROUBLE SWALLOWING: 0
SORE THROAT: 0
CHOKING: 0
ANAL BLEEDING: 1
NAUSEA: 0
COLOR CHANGE: 0

## 2024-10-15 NOTE — PROGRESS NOTES
alcohol     Types: 2 Glasses of wine per week     Comment: wine a few times a week    Drug use: No    Sexual activity: Yes     Partners: Male   Other Topics Concern    Not on file   Social History Narrative    Not on file     Social Determinants of Health     Financial Resource Strain: Low Risk  (3/26/2024)    Overall Financial Resource Strain (CARDIA)     Difficulty of Paying Living Expenses: Not hard at all   Food Insecurity: No Food Insecurity (5/7/2024)    [facility] [employer], [facility]    Hunger Screening     Within the past 12 months we worried whether our food would run out before we got money to buy more.: Never True     Within the past 12 months the food we bought just didn't last and we didn't have money to get more.: Never True   Transportation Needs: Unknown (3/26/2024)    PRAPARE - Transportation     Lack of Transportation (Medical): Not on file     Lack of Transportation (Non-Medical): No   Physical Activity: Not on file   Stress: Not on file   Social Connections: Not on file   Intimate Partner Violence: Not on file   Housing Stability: Unknown (3/26/2024)    Housing Stability Vital Sign     Unable to Pay for Housing in the Last Year: Not on file     Number of Places Lived in the Last Year: Not on file     Unstable Housing in the Last Year: No         REVIEW OF SYSTEMS: A 12-point review of systems was obtained and pertinent positives and negatives were listed below.     REVIEW OF SYSTEMS:     Constitutional: No fever, no chills, no lethargy, no weakness.  HEENT:  No headache, otalgia, itchy eyes, nasal discharge or sore throat.  Cardiac:  No chest pain, dyspnea, orthopnea or PND.  Chest:   No cough, phlegm or wheezing.  Abdomen:      Detailed by MA   Neuro:  No focal weakness, abnormal movements or seizure like activity.  Skin:   No rashes, no itching.  :   No hematuria, no pyuria, no dysuria, no flank pain.  Extremities:  No swelling or joint pains.  ROS was

## 2024-10-29 DIAGNOSIS — Z12.31 ENCOUNTER FOR SCREENING MAMMOGRAM FOR BREAST CANCER: Primary | ICD-10-CM

## 2025-01-14 ENCOUNTER — HOSPITAL ENCOUNTER (OUTPATIENT)
Dept: MAMMOGRAPHY | Age: 55
Discharge: HOME | End: 2025-01-16
Payer: COMMERCIAL

## 2025-01-14 VITALS — WEIGHT: 137 LBS | BODY MASS INDEX: 25.21 KG/M2 | HEIGHT: 62 IN

## 2025-01-14 DIAGNOSIS — Z12.31 ENCOUNTER FOR SCREENING MAMMOGRAM FOR BREAST CANCER: ICD-10-CM

## 2025-01-14 PROCEDURE — 77063 BREAST TOMOSYNTHESIS BI: CPT

## 2025-01-27 NOTE — PROGRESS NOTES
deformation or tenderness.  External Genitalia: Normal development without vulvar,vaginal or cervical lesions noted.  Normal vaginal discharge, uterus anterior, 4-6 weeks without CMT.  Adnexa nontender without abnormal masses bilaterally.  Rectal Exam: Omitted.  Extremities: Nontender without clubbing, cyanosis or edema. F.R.O.M.  Neurologic Exam: Grossly intact without noted sensorimotor deficits and oriented x 3.      Chaperone for Intimate Exam  Chaperone was offered as part of the rooming process. Patient declined and agrees to continue with exam without a chaperone.          Assessment/Plan:   Unremarkable annual Gyn exam.    Cervical Cytology Evaluation begins at 21 years old.  If Negative Cytology, Follow-up screening per current guidelines.    Mammograms every 1year. If 39 yo and last mammogram was negative.  Hereditary Breast, Ovarian, Colon and Uterine Cancer screening done.    Calcium and Vitamin D dosing reviewed.  Vaginal dryness - rx for vagifem given  Colonoscopy screening reviewed as well as onset for bone density testing (2023).  Birth control and barrier recommendations discussed.  STD counseling and prevention reviewed.  Routine health maintenance per patients PCP.  Pt to follow up for annual exam in 1 year    Jessica Lima MD  [facility]

## 2025-03-04 ENCOUNTER — OFFICE VISIT (OUTPATIENT)
Dept: OBGYN CLINIC | Age: 55
End: 2025-03-04
Payer: COMMERCIAL

## 2025-03-04 VITALS
HEART RATE: 86 BPM | BODY MASS INDEX: 25.4 KG/M2 | DIASTOLIC BLOOD PRESSURE: 81 MMHG | SYSTOLIC BLOOD PRESSURE: 116 MMHG | HEIGHT: 62 IN | WEIGHT: 138 LBS

## 2025-03-04 DIAGNOSIS — Z01.419 ENCOUNTER FOR GYNECOLOGICAL EXAMINATION: Primary | ICD-10-CM

## 2025-03-04 DIAGNOSIS — Z78.0 POSTMENOPAUSAL: ICD-10-CM

## 2025-03-04 DIAGNOSIS — Z12.31 ENCOUNTER FOR SCREENING MAMMOGRAM FOR BREAST CANCER: ICD-10-CM

## 2025-03-04 PROCEDURE — 99396 PREV VISIT EST AGE 40-64: CPT | Performed by: OBSTETRICS & GYNECOLOGY

## 2025-03-04 PROCEDURE — 99459 PELVIC EXAMINATION: CPT | Performed by: OBSTETRICS & GYNECOLOGY

## 2025-03-04 RX ORDER — ESTRADIOL 10 UG/1
10 TABLET, FILM COATED VAGINAL
Qty: 24 TABLET | Refills: 3 | Status: SHIPPED | OUTPATIENT
Start: 2025-03-06

## 2025-03-04 ASSESSMENT — ENCOUNTER SYMPTOMS
BACK PAIN: 0
ABDOMINAL PAIN: 0
COUGH: 0
SHORTNESS OF BREATH: 0

## 2025-04-09 ENCOUNTER — HOSPITAL ENCOUNTER (OUTPATIENT)
Dept: LAB | Age: 55
Discharge: HOME | End: 2025-04-09
Payer: COMMERCIAL

## 2025-04-09 DIAGNOSIS — K90.0 CELIAC DISEASE: ICD-10-CM

## 2025-04-09 PROCEDURE — 82784 ASSAY IGA/IGD/IGG/IGM EACH: CPT

## 2025-04-09 PROCEDURE — 36415 COLL VENOUS BLD VENIPUNCTURE: CPT

## 2025-04-09 PROCEDURE — 83516 IMMUNOASSAY NONANTIBODY: CPT

## 2025-04-10 LAB
GLIADIN IGA SER IA-ACNC: 3 U/ML
GLIADIN IGG SER IA-ACNC: 1.8 U/ML
IGA SERPL-MCNC: 138 MG/DL (ref 70–400)
TTG IGA SER IA-ACNC: 5.3 U/ML

## 2025-04-17 ENCOUNTER — PATIENT MESSAGE (OUTPATIENT)
Dept: FAMILY MEDICINE CLINIC | Age: 55
End: 2025-04-17

## 2025-04-17 DIAGNOSIS — Z13.6 ENCOUNTER FOR LIPID SCREENING FOR CARDIOVASCULAR DISEASE: Primary | ICD-10-CM

## 2025-04-17 DIAGNOSIS — E03.9 ACQUIRED HYPOTHYROIDISM: ICD-10-CM

## 2025-04-17 DIAGNOSIS — Z13.1 DIABETES MELLITUS SCREENING: ICD-10-CM

## 2025-04-17 DIAGNOSIS — E53.9 VITAMIN B DEFICIENCY: ICD-10-CM

## 2025-04-17 DIAGNOSIS — E55.9 VITAMIN D DEFICIENCY: ICD-10-CM

## 2025-04-17 DIAGNOSIS — Z13.220 ENCOUNTER FOR LIPID SCREENING FOR CARDIOVASCULAR DISEASE: Primary | ICD-10-CM

## 2025-04-23 ENCOUNTER — HOSPITAL ENCOUNTER (OUTPATIENT)
Age: 55
Discharge: HOME | End: 2025-04-23
Payer: COMMERCIAL

## 2025-04-23 DIAGNOSIS — Z13.220 ENCOUNTER FOR LIPID SCREENING FOR CARDIOVASCULAR DISEASE: ICD-10-CM

## 2025-04-23 DIAGNOSIS — E03.9 ACQUIRED HYPOTHYROIDISM: ICD-10-CM

## 2025-04-23 DIAGNOSIS — Z13.6 ENCOUNTER FOR LIPID SCREENING FOR CARDIOVASCULAR DISEASE: ICD-10-CM

## 2025-04-23 DIAGNOSIS — Z13.1 DIABETES MELLITUS SCREENING: ICD-10-CM

## 2025-04-23 DIAGNOSIS — E53.9 VITAMIN B DEFICIENCY: ICD-10-CM

## 2025-04-23 DIAGNOSIS — E55.9 VITAMIN D DEFICIENCY: ICD-10-CM

## 2025-04-23 LAB
25(OH)D3 SERPL-MCNC: 31.6 NG/ML (ref 30–100)
ALBUMIN SERPL-MCNC: 4.4 G/DL (ref 3.5–5.2)
ALBUMIN/GLOB SERPL: 1.7 {RATIO} (ref 1–2.5)
ALP SERPL-CCNC: 79 U/L (ref 35–104)
ALT SERPL-CCNC: 27 U/L (ref 10–35)
ANION GAP SERPL CALCULATED.3IONS-SCNC: 10 MMOL/L (ref 9–16)
AST SERPL-CCNC: 33 U/L (ref 10–35)
BACTERIA URNS QL MICRO: NORMAL
BASOPHILS # BLD: 0.05 K/UL (ref 0–0.2)
BASOPHILS NFR BLD: 1 % (ref 0–2)
BILIRUB SERPL-MCNC: 0.7 MG/DL (ref 0–1.2)
BILIRUB UR QL STRIP: NEGATIVE
BUN SERPL-MCNC: 12 MG/DL (ref 6–20)
CALCIUM SERPL-MCNC: 9.4 MG/DL (ref 8.6–10.4)
CASTS #/AREA URNS LPF: NORMAL /LPF (ref 0–8)
CHLORIDE SERPL-SCNC: 97 MMOL/L (ref 98–107)
CHOLEST SERPL-MCNC: 201 MG/DL (ref 0–199)
CHOLESTEROL/HDL RATIO: 2.9
CLARITY UR: CLEAR
CO2 SERPL-SCNC: 25 MMOL/L (ref 20–31)
COLOR UR: YELLOW
CREAT SERPL-MCNC: 0.7 MG/DL (ref 0.6–0.9)
EOSINOPHIL # BLD: 0.3 K/UL (ref 0–0.44)
EOSINOPHILS RELATIVE PERCENT: 5 % (ref 1–4)
EPI CELLS #/AREA URNS HPF: NORMAL /HPF (ref 0–5)
ERYTHROCYTE [DISTWIDTH] IN BLOOD BY AUTOMATED COUNT: 11.9 % (ref 11.8–14.4)
EST. AVERAGE GLUCOSE BLD GHB EST-MCNC: 100 MG/DL
GFR, ESTIMATED: >90 ML/MIN/1.73M2
GLUCOSE SERPL-MCNC: 94 MG/DL (ref 74–99)
GLUCOSE UR STRIP-MCNC: NEGATIVE MG/DL
HBA1C MFR BLD: 5.1 % (ref 4–6)
HCT VFR BLD AUTO: 39.3 % (ref 36.3–47.1)
HDLC SERPL-MCNC: 70 MG/DL
HGB BLD-MCNC: 13.3 G/DL (ref 11.9–15.1)
HGB UR QL STRIP.AUTO: NEGATIVE
IMM GRANULOCYTES # BLD AUTO: <0.03 K/UL (ref 0–0.3)
IMM GRANULOCYTES NFR BLD: 0 %
KETONES UR STRIP-MCNC: NEGATIVE MG/DL
LDLC SERPL CALC-MCNC: 106 MG/DL (ref 0–100)
LEUKOCYTE ESTERASE UR QL STRIP: ABNORMAL
LYMPHOCYTES NFR BLD: 1.63 K/UL (ref 1.1–3.7)
LYMPHOCYTES RELATIVE PERCENT: 25 % (ref 24–43)
MCH RBC QN AUTO: 31.1 PG (ref 25.2–33.5)
MCHC RBC AUTO-ENTMCNC: 33.8 G/DL (ref 28.4–34.8)
MCV RBC AUTO: 92 FL (ref 82.6–102.9)
MONOCYTES NFR BLD: 0.51 K/UL (ref 0.1–1.2)
MONOCYTES NFR BLD: 8 % (ref 3–12)
NEUTROPHILS NFR BLD: 61 % (ref 36–65)
NEUTS SEG NFR BLD: 4.03 K/UL (ref 1.5–8.1)
NITRITE UR QL STRIP: NEGATIVE
NRBC BLD-RTO: 0 PER 100 WBC
PH UR STRIP: 7.5 [PH] (ref 5–8)
PLATELET # BLD AUTO: 314 K/UL (ref 138–453)
PMV BLD AUTO: 8.9 FL (ref 8.1–13.5)
POTASSIUM SERPL-SCNC: 4.2 MMOL/L (ref 3.7–5.3)
PROT SERPL-MCNC: 7 G/DL (ref 6.6–8.7)
PROT UR STRIP-MCNC: NEGATIVE MG/DL
RBC # BLD AUTO: 4.27 M/UL (ref 3.95–5.11)
RBC #/AREA URNS HPF: NORMAL /HPF (ref 0–4)
SODIUM SERPL-SCNC: 132 MMOL/L (ref 136–145)
SP GR UR STRIP: 1.02 (ref 1–1.03)
T4 FREE SERPL-MCNC: 1.6 NG/DL (ref 0.92–1.68)
TRIGL SERPL-MCNC: 123 MG/DL
TSH SERPL DL<=0.05 MIU/L-ACNC: 6.33 UIU/ML (ref 0.27–4.2)
UROBILINOGEN UR STRIP-ACNC: NORMAL EU/DL (ref 0–1)
VIT B12 SERPL-MCNC: 799 PG/ML (ref 232–1245)
VLDLC SERPL CALC-MCNC: 25 MG/DL (ref 1–30)
WBC #/AREA URNS HPF: NORMAL /HPF (ref 0–5)
WBC OTHER # BLD: 6.5 K/UL (ref 3.5–11.3)

## 2025-04-23 PROCEDURE — 83036 HEMOGLOBIN GLYCOSYLATED A1C: CPT

## 2025-04-23 PROCEDURE — 84439 ASSAY OF FREE THYROXINE: CPT

## 2025-04-23 PROCEDURE — 84443 ASSAY THYROID STIM HORMONE: CPT

## 2025-04-23 PROCEDURE — 80053 COMPREHEN METABOLIC PANEL: CPT

## 2025-04-23 PROCEDURE — 36415 COLL VENOUS BLD VENIPUNCTURE: CPT

## 2025-04-23 PROCEDURE — 82306 VITAMIN D 25 HYDROXY: CPT

## 2025-04-23 PROCEDURE — 82607 VITAMIN B-12: CPT

## 2025-04-23 PROCEDURE — 80061 LIPID PANEL: CPT

## 2025-04-23 PROCEDURE — 85025 COMPLETE CBC W/AUTO DIFF WBC: CPT

## 2025-04-23 PROCEDURE — 81001 URINALYSIS AUTO W/SCOPE: CPT

## 2025-04-24 ENCOUNTER — RESULTS FOLLOW-UP (OUTPATIENT)
Dept: FAMILY MEDICINE CLINIC | Age: 55
End: 2025-04-24

## 2025-05-03 ASSESSMENT — PATIENT HEALTH QUESTIONNAIRE - PHQ9
1. LITTLE INTEREST OR PLEASURE IN DOING THINGS: NOT AT ALL
2. FEELING DOWN, DEPRESSED OR HOPELESS: NOT AT ALL
SUM OF ALL RESPONSES TO PHQ9 QUESTIONS 1 & 2: 0
SUM OF ALL RESPONSES TO PHQ QUESTIONS 1-9: 0
SUM OF ALL RESPONSES TO PHQ QUESTIONS 1-9: 0
2. FEELING DOWN, DEPRESSED OR HOPELESS: NOT AT ALL
1. LITTLE INTEREST OR PLEASURE IN DOING THINGS: NOT AT ALL
SUM OF ALL RESPONSES TO PHQ QUESTIONS 1-9: 0
SUM OF ALL RESPONSES TO PHQ QUESTIONS 1-9: 0

## 2025-05-06 ENCOUNTER — OFFICE VISIT (OUTPATIENT)
Dept: FAMILY MEDICINE CLINIC | Age: 55
End: 2025-05-06
Payer: COMMERCIAL

## 2025-05-06 VITALS
OXYGEN SATURATION: 98 % | HEART RATE: 68 BPM | SYSTOLIC BLOOD PRESSURE: 120 MMHG | TEMPERATURE: 97.3 F | DIASTOLIC BLOOD PRESSURE: 82 MMHG | WEIGHT: 141 LBS | HEIGHT: 62 IN | BODY MASS INDEX: 25.95 KG/M2

## 2025-05-06 DIAGNOSIS — Z00.00 ENCOUNTER FOR WELL ADULT EXAM WITHOUT ABNORMAL FINDINGS: Primary | ICD-10-CM

## 2025-05-06 DIAGNOSIS — E03.9 ACQUIRED HYPOTHYROIDISM: ICD-10-CM

## 2025-05-06 DIAGNOSIS — Z23 NEED FOR PROPHYLACTIC VACCINATION AND INOCULATION AGAINST VARICELLA: ICD-10-CM

## 2025-05-06 PROCEDURE — 99396 PREV VISIT EST AGE 40-64: CPT | Performed by: FAMILY MEDICINE

## 2025-05-06 RX ORDER — ZOSTER VACCINE RECOMBINANT, ADJUVANTED 50 MCG/0.5
0.5 KIT INTRAMUSCULAR ONCE
Qty: 0.5 ML | Refills: 1 | Status: SHIPPED | OUTPATIENT
Start: 2025-05-06 | End: 2025-05-06

## 2025-05-06 SDOH — ECONOMIC STABILITY: FOOD INSECURITY: WITHIN THE PAST 12 MONTHS, YOU WORRIED THAT YOUR FOOD WOULD RUN OUT BEFORE YOU GOT MONEY TO BUY MORE.: NEVER TRUE

## 2025-05-06 SDOH — ECONOMIC STABILITY: FOOD INSECURITY: WITHIN THE PAST 12 MONTHS, THE FOOD YOU BOUGHT JUST DIDN'T LAST AND YOU DIDN'T HAVE MONEY TO GET MORE.: NEVER TRUE

## 2025-05-06 NOTE — PROGRESS NOTES
Well Adult Note  Name: Halley Daniels Today’s Date: 2025   MRN: [id number] Sex: Female   Age: 55 y.o. Ethnicity: Non- / Non    : 1970 Race: White (non-)      Halley Daniels is here for a well adult exam.       Assessment & Plan   Encounter for well adult exam without abnormal findings  Acquired hypothyroidism  -     TSH reflex to FT4; Future  Need for prophylactic vaccination and inoculation against varicella  -     zoster recombinant adjuvanted vaccine (SHINGRIX) 50 MCG/0.5ML SUSR injection; Inject 0.5 mLs into the muscle once for 1 dose, Disp-0.5 mL, R-1Print     Will recheck a TSH as it was elevated.  The patient will use Zanaflex for muscle strain at the left neck area.  She will continue to follow-up with the gynecologist.  Call or return to clinic prn if these symptoms worsen or fail to improve as anticipated.  I have reviewed the instructions with the patient, answering all questions to her satisfaction.    Return in about 1 year (around 2026), or if symptoms worsen or fail to improve.       Subjective   History:  History of Present Illness  The patient is a 55-year-old female who presents today for a physical exam.    She reports experiencing neck discomfort, which she describes as a sensation akin to a pulled muscle. The onset of this symptom was in 2025, prompting her to seek chiropractic care due to concurrent issues. Despite some improvement in her alignment, she perceives the problem as more related to soft tissue. Her symptoms fluctuate, with some days being better than others. She notes that her sleep position, which involves her arms being elevated, may be contributing to the issue. She expresses a need for additional intervention beyond chiropractic treatment.    She expresses concern regarding her recent lab results, which indicate elevated thyroid levels. She is apprehensive about the potential impact of this on her bone health, given her diagnosis of

## 2025-06-10 ENCOUNTER — HOSPITAL ENCOUNTER (OUTPATIENT)
Dept: PREADMISSION TESTING | Age: 55
Discharge: HOME | End: 2025-06-14
Payer: COMMERCIAL

## 2025-06-10 VITALS
WEIGHT: 138.67 LBS | OXYGEN SATURATION: 96 % | TEMPERATURE: 98.2 F | RESPIRATION RATE: 18 BRPM | BODY MASS INDEX: 24.57 KG/M2 | SYSTOLIC BLOOD PRESSURE: 116 MMHG | HEIGHT: 63 IN | DIASTOLIC BLOOD PRESSURE: 76 MMHG | HEART RATE: 66 BPM

## 2025-06-10 DIAGNOSIS — Z01.818 PREOP TESTING: Primary | ICD-10-CM

## 2025-06-10 LAB
ANION GAP SERPL CALCULATED.3IONS-SCNC: 10 MMOL/L (ref 9–16)
BUN SERPL-MCNC: 13 MG/DL (ref 6–20)
CALCIUM SERPL-MCNC: 9.5 MG/DL (ref 8.6–10.4)
CHLORIDE SERPL-SCNC: 99 MMOL/L (ref 98–107)
CO2 SERPL-SCNC: 24 MMOL/L (ref 20–31)
CREAT SERPL-MCNC: 0.8 MG/DL (ref 0.6–0.9)
GFR, ESTIMATED: 88 ML/MIN/1.73M2
GLUCOSE SERPL-MCNC: 80 MG/DL (ref 74–99)
POTASSIUM SERPL-SCNC: 4.5 MMOL/L (ref 3.7–5.3)
SODIUM SERPL-SCNC: 133 MMOL/L (ref 136–145)

## 2025-06-10 PROCEDURE — 80048 BASIC METABOLIC PNL TOTAL CA: CPT

## 2025-06-10 PROCEDURE — 36415 COLL VENOUS BLD VENIPUNCTURE: CPT

## 2025-06-10 RX ORDER — CEFAZOLIN SODIUM/WATER 2 G/20 ML
2000 SYRINGE (ML) INTRAVENOUS ONCE
OUTPATIENT
Start: 2025-06-25

## 2025-06-10 NOTE — PRE-PROCEDURE INSTRUCTIONS
On the Day of Your Surgery, Wednesday, 6/25/2025, Please Arrive At 1000 AM     Enter the hospital through the Main Entrance, take the lobby elevators to the second floor and check in at the Surgery Registration desk.     Continue to take your home medications as you normally do up to and including the night before surgery with the exception of blood thinning medications.    Blood Thinning Medications:  Please stop prescription blood thinning medications such as Apixaban (Eliquis); Clopidogrel (Plavix); Dabigatran (Pradaxa); Prasugrel (Effient); Rivaroxaban (Xarelto); Ticagrelor (Brilinta); Warfarin (Coumadin) only as directed by your surgeon and/or the prescribing physician    Some common examples of other medications that can thin your blood are: Aspirin, Ibuprofen (Advil, Motrin), Naproxen (Aleve), Meloxicam (Mobic), Celecoxib (Celebrex), Fish Oil, many Herbal Supplements.  These medications should usually be stopped at least 7 days prior to surgery.    None-contact prescribing doctor for instruction on Estradiol    Tylenol is OK to take for pain the week prior to surgery.    Failure to stop certain medications may interfere with your scheduled surgery.    If you receive instructions from your surgeon regarding what medications to stop prior to surgery, please follow those specific instructions.    If You Have Diabetes:  Do not take any of your diabetic medications, (injectables or by mouth) the morning of surgery unless otherwise instructed by the doctor who manages your diabetes. If you are taking insulin, contact the doctor the manages your diabetes for instructions about any changes to your insulin dosages the day before surgery.      Please take the following medication(s) the day of surgery with small sips of water:              Synthroid    Please use your inhaler(s) if needed and bring your inhaler(s) from home the day of surgery.    Showering Before Surgery:     You can play an important role in your own  oral

## 2025-06-13 ENCOUNTER — HOSPITAL ENCOUNTER (OUTPATIENT)
Age: 55
Discharge: HOME | End: 2025-06-13
Payer: COMMERCIAL

## 2025-06-13 DIAGNOSIS — E03.9 ACQUIRED HYPOTHYROIDISM: ICD-10-CM

## 2025-06-13 LAB — TSH SERPL DL<=0.05 MIU/L-ACNC: 1.5 UIU/ML (ref 0.27–4.2)

## 2025-06-13 PROCEDURE — 36415 COLL VENOUS BLD VENIPUNCTURE: CPT

## 2025-06-13 PROCEDURE — 84443 ASSAY THYROID STIM HORMONE: CPT

## 2025-06-16 ENCOUNTER — RESULTS FOLLOW-UP (OUTPATIENT)
Dept: FAMILY MEDICINE CLINIC | Age: 55
End: 2025-06-16

## 2025-06-24 ENCOUNTER — ANESTHESIA EVENT (OUTPATIENT)
Dept: OPERATING ROOM | Age: 55
End: 2025-06-24
Payer: COMMERCIAL

## 2025-06-25 ENCOUNTER — HOSPITAL ENCOUNTER (OUTPATIENT)
Age: 55
Setting detail: OUTPATIENT SURGERY
Discharge: HOME | End: 2025-06-25
Attending: PODIATRIST | Admitting: PODIATRIST
Payer: COMMERCIAL

## 2025-06-25 ENCOUNTER — ANESTHESIA (OUTPATIENT)
Dept: OPERATING ROOM | Age: 55
End: 2025-06-25
Payer: COMMERCIAL

## 2025-06-25 ENCOUNTER — APPOINTMENT (OUTPATIENT)
Dept: GENERAL RADIOLOGY | Age: 55
End: 2025-06-25
Attending: PODIATRIST
Payer: COMMERCIAL

## 2025-06-25 VITALS
TEMPERATURE: 97.7 F | HEIGHT: 63 IN | RESPIRATION RATE: 11 BRPM | HEART RATE: 84 BPM | WEIGHT: 136 LBS | OXYGEN SATURATION: 98 % | DIASTOLIC BLOOD PRESSURE: 88 MMHG | SYSTOLIC BLOOD PRESSURE: 130 MMHG | BODY MASS INDEX: 24.1 KG/M2

## 2025-06-25 PROCEDURE — 7100000011 HC PHASE II RECOVERY - ADDTL 15 MIN: Performed by: PODIATRIST

## 2025-06-25 PROCEDURE — 3600000002 HC SURGERY LEVEL 2 BASE: Performed by: PODIATRIST

## 2025-06-25 PROCEDURE — 2580000003 HC RX 258: Performed by: ANESTHESIOLOGY

## 2025-06-25 PROCEDURE — 3700000001 HC ADD 15 MINUTES (ANESTHESIA): Performed by: PODIATRIST

## 2025-06-25 PROCEDURE — 7100000000 HC PACU RECOVERY - FIRST 15 MIN: Performed by: PODIATRIST

## 2025-06-25 PROCEDURE — 6360000002 HC RX W HCPCS: Performed by: NURSE ANESTHETIST, CERTIFIED REGISTERED

## 2025-06-25 PROCEDURE — 7100000010 HC PHASE II RECOVERY - FIRST 15 MIN: Performed by: PODIATRIST

## 2025-06-25 PROCEDURE — 3600000012 HC SURGERY LEVEL 2 ADDTL 15MIN: Performed by: PODIATRIST

## 2025-06-25 PROCEDURE — 6360000002 HC RX W HCPCS: Performed by: PODIATRIST

## 2025-06-25 PROCEDURE — C1713 ANCHOR/SCREW BN/BN,TIS/BN: HCPCS | Performed by: PODIATRIST

## 2025-06-25 PROCEDURE — 2709999900 HC NON-CHARGEABLE SUPPLY: Performed by: PODIATRIST

## 2025-06-25 PROCEDURE — 7100000001 HC PACU RECOVERY - ADDTL 15 MIN: Performed by: PODIATRIST

## 2025-06-25 PROCEDURE — 6370000000 HC RX 637 (ALT 250 FOR IP): Performed by: ANESTHESIOLOGY

## 2025-06-25 PROCEDURE — 3700000000 HC ANESTHESIA ATTENDED CARE: Performed by: PODIATRIST

## 2025-06-25 PROCEDURE — 2720000010 HC SURG SUPPLY STERILE: Performed by: PODIATRIST

## 2025-06-25 PROCEDURE — C1769 GUIDE WIRE: HCPCS | Performed by: PODIATRIST

## 2025-06-25 DEVICE — GRAFT BONE SUB 2.5CC ALLOSYNC PURE: Type: IMPLANTABLE DEVICE | Site: FOOT | Status: FUNCTIONAL

## 2025-06-25 DEVICE — SCREW LP LOCKING TI 3.5 X 18: Type: IMPLANTABLE DEVICE | Site: FOOT | Status: FUNCTIONAL

## 2025-06-25 DEVICE — IMPLANTABLE DEVICE: Type: IMPLANTABLE DEVICE | Site: FOOT | Status: FUNCTIONAL

## 2025-06-25 DEVICE — SCREW BONE L26MM DIA3.5MM TI LO PROF FOR ANK FX SET: Type: IMPLANTABLE DEVICE | Site: FOOT | Status: FUNCTIONAL

## 2025-06-25 DEVICE — ANCHOR FIX DISP FOR ANK FRAC SYS BB-TAK: Type: IMPLANTABLE DEVICE | Site: FOOT | Status: FUNCTIONAL

## 2025-06-25 DEVICE — KREULOCK SCREW TI 3.5X16: Type: IMPLANTABLE DEVICE | Site: FOOT | Status: FUNCTIONAL

## 2025-06-25 RX ORDER — ONDANSETRON 2 MG/ML
4 INJECTION INTRAMUSCULAR; INTRAVENOUS
Status: DISCONTINUED | OUTPATIENT
Start: 2025-06-25 | End: 2025-06-25 | Stop reason: HOSPADM

## 2025-06-25 RX ORDER — SODIUM CHLORIDE 0.9 % (FLUSH) 0.9 %
5-40 SYRINGE (ML) INJECTION EVERY 12 HOURS SCHEDULED
Status: DISCONTINUED | OUTPATIENT
Start: 2025-06-25 | End: 2025-06-25 | Stop reason: HOSPADM

## 2025-06-25 RX ORDER — SODIUM CHLORIDE 9 MG/ML
INJECTION, SOLUTION INTRAVENOUS PRN
Status: DISCONTINUED | OUTPATIENT
Start: 2025-06-25 | End: 2025-06-25 | Stop reason: HOSPADM

## 2025-06-25 RX ORDER — LIDOCAINE HYDROCHLORIDE 10 MG/ML
1 INJECTION, SOLUTION EPIDURAL; INFILTRATION; INTRACAUDAL; PERINEURAL
Status: DISCONTINUED | OUTPATIENT
Start: 2025-06-26 | End: 2025-06-25 | Stop reason: HOSPADM

## 2025-06-25 RX ORDER — SODIUM CHLORIDE, SODIUM LACTATE, POTASSIUM CHLORIDE, CALCIUM CHLORIDE 600; 310; 30; 20 MG/100ML; MG/100ML; MG/100ML; MG/100ML
INJECTION, SOLUTION INTRAVENOUS CONTINUOUS
Status: DISCONTINUED | OUTPATIENT
Start: 2025-06-25 | End: 2025-06-25 | Stop reason: HOSPADM

## 2025-06-25 RX ORDER — SODIUM CHLORIDE 0.9 % (FLUSH) 0.9 %
5-40 SYRINGE (ML) INJECTION PRN
Status: DISCONTINUED | OUTPATIENT
Start: 2025-06-25 | End: 2025-06-25 | Stop reason: HOSPADM

## 2025-06-25 RX ORDER — GLYCOPYRROLATE 0.2 MG/ML
INJECTION INTRAMUSCULAR; INTRAVENOUS
Status: DISCONTINUED | OUTPATIENT
Start: 2025-06-25 | End: 2025-06-25 | Stop reason: SDUPTHER

## 2025-06-25 RX ORDER — ONDANSETRON 2 MG/ML
INJECTION INTRAMUSCULAR; INTRAVENOUS
Status: DISCONTINUED | OUTPATIENT
Start: 2025-06-25 | End: 2025-06-25 | Stop reason: SDUPTHER

## 2025-06-25 RX ORDER — PROPOFOL 10 MG/ML
INJECTION, EMULSION INTRAVENOUS
Status: DISCONTINUED | OUTPATIENT
Start: 2025-06-25 | End: 2025-06-25 | Stop reason: SDUPTHER

## 2025-06-25 RX ORDER — SODIUM CHLORIDE 9 MG/ML
INJECTION, SOLUTION INTRAVENOUS CONTINUOUS
Status: DISCONTINUED | OUTPATIENT
Start: 2025-06-25 | End: 2025-06-25 | Stop reason: HOSPADM

## 2025-06-25 RX ORDER — NALOXONE HYDROCHLORIDE 0.4 MG/ML
INJECTION, SOLUTION INTRAMUSCULAR; INTRAVENOUS; SUBCUTANEOUS PRN
Status: DISCONTINUED | OUTPATIENT
Start: 2025-06-25 | End: 2025-06-25 | Stop reason: HOSPADM

## 2025-06-25 RX ORDER — KETOROLAC TROMETHAMINE 30 MG/ML
INJECTION, SOLUTION INTRAMUSCULAR; INTRAVENOUS
Status: DISCONTINUED | OUTPATIENT
Start: 2025-06-25 | End: 2025-06-25 | Stop reason: SDUPTHER

## 2025-06-25 RX ORDER — DEXAMETHASONE SODIUM PHOSPHATE 10 MG/ML
INJECTION, SOLUTION INTRAMUSCULAR; INTRAVENOUS
Status: DISCONTINUED | OUTPATIENT
Start: 2025-06-25 | End: 2025-06-25 | Stop reason: SDUPTHER

## 2025-06-25 RX ORDER — OXYCODONE HYDROCHLORIDE 5 MG/1
5 TABLET ORAL
Status: COMPLETED | OUTPATIENT
Start: 2025-06-25 | End: 2025-06-25

## 2025-06-25 RX ORDER — METOCLOPRAMIDE HYDROCHLORIDE 5 MG/ML
10 INJECTION INTRAMUSCULAR; INTRAVENOUS
Status: DISCONTINUED | OUTPATIENT
Start: 2025-06-25 | End: 2025-06-25 | Stop reason: HOSPADM

## 2025-06-25 RX ORDER — FENTANYL CITRATE 50 UG/ML
INJECTION, SOLUTION INTRAMUSCULAR; INTRAVENOUS
Status: DISCONTINUED | OUTPATIENT
Start: 2025-06-25 | End: 2025-06-25 | Stop reason: SDUPTHER

## 2025-06-25 RX ORDER — HYDROMORPHONE HYDROCHLORIDE 1 MG/ML
0.5 INJECTION, SOLUTION INTRAMUSCULAR; INTRAVENOUS; SUBCUTANEOUS EVERY 5 MIN PRN
Status: DISCONTINUED | OUTPATIENT
Start: 2025-06-25 | End: 2025-06-25 | Stop reason: HOSPADM

## 2025-06-25 RX ORDER — LIDOCAINE HYDROCHLORIDE 20 MG/ML
INJECTION, SOLUTION EPIDURAL; INFILTRATION; INTRACAUDAL; PERINEURAL
Status: DISCONTINUED | OUTPATIENT
Start: 2025-06-25 | End: 2025-06-25 | Stop reason: SDUPTHER

## 2025-06-25 RX ORDER — FENTANYL CITRATE 50 UG/ML
25 INJECTION, SOLUTION INTRAMUSCULAR; INTRAVENOUS EVERY 5 MIN PRN
Status: DISCONTINUED | OUTPATIENT
Start: 2025-06-25 | End: 2025-06-25 | Stop reason: HOSPADM

## 2025-06-25 RX ORDER — APREPITANT 40 MG/1
40 CAPSULE ORAL ONCE
Status: DISCONTINUED | OUTPATIENT
Start: 2025-06-25 | End: 2025-06-25 | Stop reason: HOSPADM

## 2025-06-25 RX ORDER — CEFAZOLIN SODIUM/WATER 2 G/20 ML
2000 SYRINGE (ML) INTRAVENOUS ONCE
Status: COMPLETED | OUTPATIENT
Start: 2025-06-25 | End: 2025-06-25

## 2025-06-25 RX ORDER — MIDAZOLAM HYDROCHLORIDE 1 MG/ML
INJECTION, SOLUTION INTRAMUSCULAR; INTRAVENOUS
Status: DISCONTINUED | OUTPATIENT
Start: 2025-06-25 | End: 2025-06-25 | Stop reason: SDUPTHER

## 2025-06-25 RX ADMIN — MIDAZOLAM 2 MG: 1 INJECTION INTRAMUSCULAR; INTRAVENOUS at 13:10

## 2025-06-25 RX ADMIN — SODIUM CHLORIDE, POTASSIUM CHLORIDE, SODIUM LACTATE AND CALCIUM CHLORIDE: 600; 310; 30; 20 INJECTION, SOLUTION INTRAVENOUS at 10:58

## 2025-06-25 RX ADMIN — Medication 2000 MG: at 13:22

## 2025-06-25 RX ADMIN — GLYCOPYRROLATE 0.2 MG: 0.2 INJECTION INTRAMUSCULAR; INTRAVENOUS at 14:39

## 2025-06-25 RX ADMIN — SODIUM CHLORIDE, POTASSIUM CHLORIDE, SODIUM LACTATE AND CALCIUM CHLORIDE: 600; 310; 30; 20 INJECTION, SOLUTION INTRAVENOUS at 15:03

## 2025-06-25 RX ADMIN — KETOROLAC TROMETHAMINE 30 MG: 30 INJECTION, SOLUTION INTRAMUSCULAR at 15:02

## 2025-06-25 RX ADMIN — FENTANYL CITRATE 25 MCG: 50 INJECTION INTRAMUSCULAR; INTRAVENOUS at 15:27

## 2025-06-25 RX ADMIN — FENTANYL CITRATE 50 MCG: 50 INJECTION INTRAMUSCULAR; INTRAVENOUS at 13:30

## 2025-06-25 RX ADMIN — LIDOCAINE HYDROCHLORIDE 100 MG: 20 INJECTION, SOLUTION EPIDURAL; INFILTRATION; INTRACAUDAL; PERINEURAL at 13:15

## 2025-06-25 RX ADMIN — DEXAMETHASONE SODIUM PHOSPHATE 10 MG: 10 INJECTION, SOLUTION INTRA-ARTICULAR; INTRALESIONAL; INTRAMUSCULAR; INTRAVENOUS; SOFT TISSUE at 13:25

## 2025-06-25 RX ADMIN — FENTANYL CITRATE 50 MCG: 50 INJECTION INTRAMUSCULAR; INTRAVENOUS at 13:14

## 2025-06-25 RX ADMIN — OXYCODONE HYDROCHLORIDE 5 MG: 5 TABLET ORAL at 16:12

## 2025-06-25 RX ADMIN — PROPOFOL 170 MG: 10 INJECTION, EMULSION INTRAVENOUS at 13:15

## 2025-06-25 RX ADMIN — FENTANYL CITRATE 25 MCG: 50 INJECTION INTRAMUSCULAR; INTRAVENOUS at 15:24

## 2025-06-25 RX ADMIN — ONDANSETRON 4 MG: 2 INJECTION, SOLUTION INTRAMUSCULAR; INTRAVENOUS at 14:58

## 2025-06-25 ASSESSMENT — PAIN DESCRIPTION - LOCATION: LOCATION: FOOT

## 2025-06-25 ASSESSMENT — ENCOUNTER SYMPTOMS
DIARRHEA: 0
VOMITING: 0
BLOOD IN STOOL: 0
SHORTNESS OF BREATH: 0
WHEEZING: 0
NAUSEA: 0
SINUS PRESSURE: 0
ABDOMINAL PAIN: 0
TROUBLE SWALLOWING: 0
RHINORRHEA: 0
APNEA: 0
COUGH: 0
CONSTIPATION: 0
SORE THROAT: 0
CHEST TIGHTNESS: 0
ALLERGIC/IMMUNOLOGIC COMMENTS: CELIAC DISEASE
BACK PAIN: 0

## 2025-06-25 ASSESSMENT — PAIN SCALES - GENERAL
PAINLEVEL_OUTOF10: 2
PAINLEVEL_OUTOF10: 3
PAINLEVEL_OUTOF10: 4
PAINLEVEL_OUTOF10: 4

## 2025-06-25 ASSESSMENT — PAIN DESCRIPTION - ORIENTATION: ORIENTATION: LEFT

## 2025-06-25 ASSESSMENT — PAIN - FUNCTIONAL ASSESSMENT
PAIN_FUNCTIONAL_ASSESSMENT: NONE - DENIES PAIN
PAIN_FUNCTIONAL_ASSESSMENT: 0-10

## 2025-06-25 ASSESSMENT — PAIN DESCRIPTION - DESCRIPTORS: DESCRIPTORS: THROBBING

## 2025-06-25 NOTE — DISCHARGE INSTRUCTIONS
Podiatry:  - Please make a follow-up visit with Dr. Castaneda outpatient 1 week after discharge  - Please keep your lower extremity wounds dry clean and covered at all times until follow-up visit  - Weightbearing status:: non weight bearing in CAM boot   - Please take all prescribed medications as instructed  - Please restart all home medications as prescribed  - Please return to the hospital if any of the following local signs of infection arise: Increased/streaking redness, pain, swelling, drainage or malodor  - Please return to the hospital for any the following systemic signs of infection arise: Nausea, vomiting, fever, chills, diarrhea, shortness of breath or chest pain

## 2025-06-25 NOTE — BRIEF OP NOTE
Brief Postoperative Note      Patient: Halley Daniels  YOB: 1970  MRN: [id number]    Date of Procedure: 6/25/2025    Pre-Op Diagnosis Codes:      * Hallux valgus, left [M20.12]    Post-Op Diagnosis: Same       Procedure(s):  LEFT FOOT LAPIDUS BUNIONECTOMY DISTAL SOFT TISSUE PROCEDURE, AKIN OSTEOTOMY,PARTIAL EXCISION TARSAL, BONE MARROW ASPIRATION    Surgeon(s):  Lm Castaneda DPM    Assistant:  Resident: Moent Herman DPM    Anesthesia: General    Estimated Blood Loss (mL): Minimal    Complications: None    Specimens:   * No specimens in log *    Implants:  Implant Name Type Inv. Item Serial No.  Lot No. LRB No. Used Action   GRAFT BONE SUB 2.5CC ALLOSYNC PURE - SGRAFT ID: [id number]  GRAFT BONE SUB 2.5CC ALLOSYNC PURE GRAFT ID: [id number] [employer]  Left 1 Implanted   SCREW BONE L46MM DIA4MM STD FT ANK TI SELF DRL ST KASIA FULL - [id number]  SCREW BONE L46MM DIA4MM STD FT ANK TI SELF DRL ST KASIA FULL  [employer]  Left 1 Implanted   PLATE BNE 4 H TI LO PROF LAPIDUS - [id number]  PLATE BNE 4 H TI LO PROF LAPIDUS  [employer]  Left 1 Implanted   KREULOCK SCREW TI 3.5X16 - [id number]  KREULOCK SCREW TI 3.5X16  [employer]  Left 2 Implanted   SCREW LP LOCKING TI 3.5 X 18 - [id number]  SCREW LP LOCKING TI 3.5 X 18  [employer]  Left 1 Implanted   SCREW BONE L26MM DIA3.5MM TI LO PROF FOR ANK FX SET - [id number]  SCREW BONE L26MM DIA3.5MM TI LO PROF FOR ANK FX SET  [employer]  Left 1 Implanted   ANCHOR FIX DISP FOR ANK FRAC SYS BB-VARSHA - [id number]  ANCHOR FIX DISP FOR ANK FRAC SYS BB-VARSHA  [employer]  Left 2 Implanted         Drains: * No LDAs found *    Findings:  Infection Present At Time Of Surgery (PATOS) (choose all levels that have infection present):  No infection present  Other Findings: Hallux valgus left    Electronically signed by Lm Castaneda DPM on 6/25/2025 at 4:07 PM

## 2025-06-25 NOTE — ANESTHESIA PRE PROCEDURE
Department of Anesthesiology  Preprocedure Note       Name:  Halley Daniels   Age:  55 y.o.  :  1970                                          MRN:  [id number]         Date:  2025      Surgeon: Surgeon(s):  Lm Castaneda DPM    Procedure: Procedure(s):  LEFT FOOT LAPIDUS BUNIONECTOMY DISTAL SOFT TISSUE PROCEDURE, AKIN OSTEOTOMY,PARTIAL EXCISION TARSAL, BONE MARROW ASPIRATION    Medications prior to admission:   Prior to Admission medications    Medication Sig Start Date End Date Taking? Authorizing Provider   tiZANidine (ZANAFLEX) 4 MG tablet Take 1 tablet by mouth nightly as needed (neck strain) 25  Yes Mercedes Mckinney MD   CALCIUM-VITAMIN D PO Take by mouth   Yes ProviderYaquelin MD   vitamin B-12 (CYANOCOBALAMIN) 1000 MCG tablet Take 1 tablet by mouth daily   Yes ProviderYaquelin MD   SYNTHROID 125 MCG tablet  16  Yes ProviderYaquelin MD   Estradiol (VAGIFEM) 10 MCG TABS vaginal tablet Place 1 tablet vaginally Twice a Week 3/6/25   Jessica Lima MD       Current medications:    Current Facility-Administered Medications   Medication Dose Route Frequency Provider Last Rate Last Admin    [START ON 2025] lidocaine PF 1 % injection 1 mL  1 mL IntraDERmal Once PRN Narayan Pro MD        0.9 % sodium chloride infusion   IntraVENous Continuous Narayan Pro MD        lactated ringers infusion   IntraVENous Continuous Narayan Pro  mL/hr at 25 1058 New Bag at 25 1058    sodium chloride flush 0.9 % injection 5-40 mL  5-40 mL IntraVENous 2 times per day Narayan Pro MD        sodium chloride flush 0.9 % injection 5-40 mL  5-40 mL IntraVENous PRN Narayan Pro MD        0.9 % sodium chloride infusion   IntraVENous PRN Narayan Pro MD        ceFAZolin (ANCEF) 2000 mg in 20 mL IV syringe  2,000 mg IntraVENous Once Lm Castaneda DPM           Allergies:  No Known

## 2025-06-25 NOTE — H&P
History and Physical Service   [facility]    HISTORY AND PHYSICAL EXAMINATION            Date of Evaluation: 2025  Patient name:  Halley Daniels  MRN:   [id number]  YOB: 1970  PCP:    Mercedes Mckinney MD    History Obtained From:     Patient, medical records    History of Present Illness:     This is Halley Daniels a 55 y.o. female who presents today for a LEFT FOOT LAPIDUS BUNIONECTOMY DISTAL SOFT TISSUE PROCEDURE, AKIN OSTEOTOMY,PARTIAL EXCISION TARSAL, BONE MARROW ASPIRATION by Lm Castaneda DPM for Hallux valgus, left.The patient's chief complaint is left foot pain that has been gradually worsening over the past few years. Pain is aggravated with prolonged walking, standing, and wearing shoes and minimally improved with rest. No previous treatments. Denies hx of diabetes. Denies any current blood thinning medications.      Past Medical History:     Past Medical History:   Diagnosis Date    Abdominal pain     Abnormal Pap smear of cervix     Anxiety     Autoimmune disorder Celiac Disease    2020    Celiac disease     Hypothyroidism     Wears glasses         Past Surgical History:     Past Surgical History:   Procedure Laterality Date     SECTION  G2, 2007    CHOLECYSTECTOMY      COLONOSCOPY N/A 07/10/2020    COLONOSCOPY WITH BIOPSY performed by Mey Ohara MD at [facility]    COLONOSCOPY N/A 2024    COLONOSCOPY POLYPECTOMY HOT BIOPSY performed by Jorge L Blandon MD at [facility] OR    [place] LAPAROSCOPY SURG CHOLECYSTECTOMY N/A 2018    XI ROBOTIC LAPAROSCOPIC CHOLECYSTECTOMY, performed by Ken Valdez IV, DO at [facility] OR    TONSILLECTOMY      UPPER GASTROINTESTINAL ENDOSCOPY N/A 07/10/2020    EGD BIOPSY performed by Mey Ohara MD at [facility]    UPPER GASTROINTESTINAL ENDOSCOPY N/A 2022    EGD BIOPSIES performed by Mey Ohara MD at [facility] OR        Medications Prior to Admission:     Prior to

## 2025-06-25 NOTE — ANESTHESIA POSTPROCEDURE EVALUATION
Department of Anesthesiology  Postprocedure Note    Patient: Halley Daniels  MRN: [id number]  YOB: 1970  Date of evaluation: 6/25/2025    Procedure Summary       Date: 06/25/25 Room / Location: [address]    Anesthesia Start: 1311 Anesthesia Stop: 1536    Procedure: LEFT FOOT LAPIDUS BUNIONECTOMY DISTAL SOFT TISSUE PROCEDURE, AKIN OSTEOTOMY,PARTIAL EXCISION TARSAL, BONE MARROW ASPIRATION (Left) Diagnosis:       Hallux valgus, left      (Hallux valgus, left [M20.12])    Surgeons: Lm Castaneda DPM Responsible Provider: Shelli Quintero MD    Anesthesia Type: general ASA Status: 2            Anesthesia Type: No value filed.    Anastasia Phase I: Anastasia Score: 8    Anastasia Phase II:      Anesthesia Post Evaluation    Comments: [facility]    No notable events documented.

## 2025-06-25 NOTE — OP NOTE
Operative Note      Patient: Halley Daniels  YOB: 1970  MRN: [id number]    Date of Procedure: 6/25/2025    Pre-Op Diagnosis Codes:      * Hallux valgus, left [M20.12]    Post-Op Diagnosis: Same       Procedure:  Lapidus bunionectomy left  Bone marrow aspiration left calcaneal  Distal soft tissue procedure left foot  Partial excision left medial cuneiform    Surgeon(s):  Lm Castaneda DPM    Assistant:   Resident: Monet Herman DPM    Anesthesia: General    Estimated Blood Loss (mL): Minimal    Complications: None    Specimens:   * No specimens in log *    Implants:  Implant Name Type Inv. Item Serial No.  Lot No. LRB No. Used Action   GRAFT BONE SUB 2.5CC ALLOSYNC PURE - SGRAFT ID: [id number]  GRAFT BONE SUB 2.5CC ALLOSYNC PURE GRAFT ID: [id number] [employer]  Left 1 Implanted   SCREW BONE L46MM DIA4MM STD FT ANK TI SELF DRL ST KASIA FULL - [id number]  SCREW BONE L46MM DIA4MM STD FT ANK TI SELF DRL ST KASIA FULL  [employer]  Left 1 Implanted   PLATE BNE 4 H TI LO PROF LAPIDUS - [id number]  PLATE BNE 4 H TI LO PROF LAPIDUS  [employer]  Left 1 Implanted   KREULOCK SCREW TI 3.5X16 - [id number]  KREULOCK SCREW TI 3.5X16  [employer]  Left 2 Implanted   SCREW LP LOCKING TI 3.5 X 18 - [id number]  SCREW LP LOCKING TI 3.5 X 18  [employer]  Left 1 Implanted   SCREW BONE L26MM DIA3.5MM TI LO PROF FOR ANK FX SET - [id number]  SCREW BONE L26MM DIA3.5MM TI LO PROF FOR ANK FX SET  [employer]  Left 1 Implanted   ANCHOR FIX DISP FOR ANK FRAC SYS BB-VARSHA - [id number]  ANCHOR FIX DISP FOR ANK FRAC SYS BB-VARSHA  [employer]  Left 2 Implanted         Drains: * No LDAs found *    Findings:  Infection Present At Time Of Surgery (PATOS) (choose all levels that have infection present):  No infection present  Other Findings: Hallux valgus left    Detailed Description of Procedure:   ..Indication for Surgery:  Patient presents with pain to the left foot. The patient has chosen

## (undated) DEVICE — CANNULA IV 18GA L15IN BLNT FILL LUERLOCK HUB MJCT

## (undated) DEVICE — NDL CNTR 40CT FM MAG: Brand: MEDLINE INDUSTRIES, INC.

## (undated) DEVICE — Device

## (undated) DEVICE — TIP COVER ACCESSORY

## (undated) DEVICE — STAZ LOWER EXTREMITY: Brand: MEDLINE INDUSTRIES, INC.

## (undated) DEVICE — PERRYSBURG ENDO PACK: Brand: MEDLINE INDUSTRIES, INC.

## (undated) DEVICE — SUTURE VICRYL + SZ 3-0 L18IN CT 1 CR ABSRB VCP838D

## (undated) DEVICE — GLOVE SURG SZ 75 L12IN FNGR THK79MIL GRN LTX FREE

## (undated) DEVICE — INSUFFLATION NEEDLE TO ESTABLISH PNEUMOPERITONEUM.: Brand: INSUFFLATION NEEDLE

## (undated) DEVICE — 35 ML SYRINGE LUER-LOCK TIP: Brand: MONOJECT

## (undated) DEVICE — STRIP,CLOSURE,WOUND,MEDI-STRIP,1/2X4: Brand: MEDLINE

## (undated) DEVICE — ARM DRAPE

## (undated) DEVICE — POLYP TRAP: Brand: TRAPEASE®

## (undated) DEVICE — GUIDEWIRE ORTH DIA0.053IN THRD W/ TRCR TIP LSR LN FOR

## (undated) DEVICE — GLOVE ORANGE PI 7   MSG9070

## (undated) DEVICE — TUBING, SUCTION, 3/16" X 10', STRAIGHT: Brand: MEDLINE

## (undated) DEVICE — SKIN PREP TRAY W/CHG: Brand: MEDLINE INDUSTRIES, INC.

## (undated) DEVICE — BIT DRL DIA2MM STR CANN FOR 2.5MM MIC COMPR FULL THRD SCR

## (undated) DEVICE — FORCEPS BX L240CM WRK CHN 2.8MM STD CAP W/ NDL MIC MESH

## (undated) DEVICE — BLADE,CARBON-STEEL,15,STRL,DISPOSABLE,TB: Brand: MEDLINE

## (undated) DEVICE — ERBE NESSY®PLATE 170 SPLIT; 168CM²; CABLE 3M: Brand: ERBE

## (undated) DEVICE — SUTURE ETHILON SZ 3-0 L18IN NONABSORBABLE BLK L24MM PS-1 3/8 1663G

## (undated) DEVICE — BANDAGE COBAN 4 IN COMPR W4INXL5YD FOAM COHESIVE QUIK STK SELF ADH SFT

## (undated) DEVICE — 4-PORT MANIFOLD: Brand: NEPTUNE 2

## (undated) DEVICE — GLOVE SURG SZ 75 CRM LTX FREE POLYISOPRENE POLYMER BEAD ANTI

## (undated) DEVICE — BNDG,ELSTC,MATRIX,STRL,4"X5YD,LF,HOOK&LP: Brand: MEDLINE

## (undated) DEVICE — GLOVE SURG SZ 65 THK91MIL LTX FREE SYN POLYISOPRENE

## (undated) DEVICE — PADDING,UNDERCAST,COTTON, 4"X4YD STERILE: Brand: MEDLINE

## (undated) DEVICE — BITEBLOCK 54FR W/ DENT RIM BLOX

## (undated) DEVICE — ADAPTER CLEANING PORPOISE CLEANING

## (undated) DEVICE — GOWN,AURORA,NONRNF,XL,30/CS: Brand: MEDLINE

## (undated) DEVICE — DVD-R MAXWELL ROBOTIC SURGERY

## (undated) DEVICE — ADAPTER,CATHETER/SYRINGE/LUER,STERILE: Brand: MEDLINE

## (undated) DEVICE — TROCAR: Brand: KII FIOS FIRST ENTRY

## (undated) DEVICE — GAUZE,SPONGE,3"X3",4PLY,NS,LF: Brand: MEDLINE

## (undated) DEVICE — CONNECTOR TBNG AUX H2O JET DISP FOR OLY 160/180 SER

## (undated) DEVICE — C-ARMOR C-ARM EQUIPMENT COVERS CLEAR STERILE UNIVERSAL FIT 12 PER CASE: Brand: C-ARMOR

## (undated) DEVICE — CHLORAPREP 26ML ORANGE

## (undated) DEVICE — GARMENT,MEDLINE,DVT,INT,CALF,MED, GEN2: Brand: MEDLINE

## (undated) DEVICE — CONTAINER,SPECIMEN,4OZ,OR STRL: Brand: MEDLINE

## (undated) DEVICE — CANNULA SEAL

## (undated) DEVICE — SOLUTION IV 500ML 0.9% SOD BOTTLE CHL LTWT DURABLE SHATTERPROOF

## (undated) DEVICE — GUIDEWIRE ORTH DIA0.045IN W/ TRCR TIP LSR LN

## (undated) DEVICE — COVER,LIGHT HANDLE,FLX,2/PK: Brand: MEDLINE INDUSTRIES, INC.

## (undated) DEVICE — TOWEL,OR,DSP,ST,NATURAL,DLX,4/PK,20PK/CS: Brand: MEDLINE

## (undated) DEVICE — FORCEPS BX L240CM JAW DIA2.8MM L CAP W/ NDL MIC MESH TOOTH

## (undated) DEVICE — BLADELESS OBTURATOR: Brand: WECK VISTA

## (undated) DEVICE — PAD,ABDOMINAL,5"X9",ST,LF,25/BX: Brand: MEDLINE INDUSTRIES, INC.

## (undated) DEVICE — SOLUTION ANTIFOG VIS SYS CLEARIFY LAPSCP

## (undated) DEVICE — ADHESIVE SKIN CLSR 0.7ML TOP DERMBND ADV

## (undated) DEVICE — SYRINGE MED 50ML LUERLOCK TIP

## (undated) DEVICE — KENDALL SCD EXPRESS SLEEVES, KNEE LENGTH, MEDIUM: Brand: KENDALL SCD

## (undated) DEVICE — SCREW BNE L16MM DIA3.5MM ANK TI LO PROF: Type: IMPLANTABLE DEVICE | Site: FOOT | Status: NON-FUNCTIONAL

## (undated) DEVICE — BIT DRL DIA32MM STR CANN FOR STD COMPR FULL THRD SCR

## (undated) DEVICE — Device: Brand: DEFENDO VALVE AND CONNECTOR KIT

## (undated) DEVICE — GUIDEWIRE SURG L9.25IN DIA0.092IN W/ TRCR TIP

## (undated) DEVICE — SUTURE VICRYL + SZ 0 L27IN ABSRB UD L36MM CT-1 1/2 CIR VCPP41D

## (undated) DEVICE — GLOVE ORANGE PI 7 1/2   MSG9075

## (undated) DEVICE — SUTURE VICRYL SZ 3-0 L18IN ABSRB UD L26MM SH 1/2 CIR J864D

## (undated) DEVICE — BAG SPEC LAP H6IN DIA3IN 250ML 10 12MM CANN ATTCH MEM WIRE

## (undated) DEVICE — NEEDLE SPNL 18GA L3.5IN W/ QNCKE SHARPER BVL DURA CLICK

## (undated) DEVICE — INSUFFLATION TUBING SET WITH FILTER, FUNNEL CONNECTOR AND LUER LOCK: Brand: JOSNOE MEDICAL INC

## (undated) DEVICE — GLOVE ORANGE PI 8 1/2   MSG9085

## (undated) DEVICE — Z DUP USE 2641840 CLIP INT L POLYMER LOK LIG HEM O LOK

## (undated) DEVICE — ELECTRODE PT RET AD L9FT HI MOIST COND ADH HYDRGEL CORDED

## (undated) DEVICE — BIT DRL DIA2.7MM STR CANN FOR MINI COMPR FULL THRD SCR

## (undated) DEVICE — SNARE ENDOSCP POLYP LG 2.4 MM 240 CM 30 MM 2.8 MM CAPTIVATOR

## (undated) DEVICE — GLOVE ORANGE PI 8   MSG9080

## (undated) DEVICE — BIT DRL DIA2.5MM FT ANK S STL CANN FOR QUICKFIX SCR SYS

## (undated) DEVICE — PROTECTOR ULN NRV PUR FOAM HK LOOP STRP ANATOMICALLY

## (undated) DEVICE — SUTURE VCRL + SZ 0 L27IN ABSRB VLT L26MM UR-6 5/8 CIR VCP603H

## (undated) DEVICE — BIT DRL DIA3.5MM TRIM-IT

## (undated) DEVICE — DEVICE TRCR 12X9X3IN WHT CLSR DISP OMNICLOSE